# Patient Record
Sex: MALE | NOT HISPANIC OR LATINO | Employment: FULL TIME | URBAN - METROPOLITAN AREA
[De-identification: names, ages, dates, MRNs, and addresses within clinical notes are randomized per-mention and may not be internally consistent; named-entity substitution may affect disease eponyms.]

---

## 2017-08-28 ENCOUNTER — TELEPHONE (OUTPATIENT)
Dept: UROLOGY | Facility: CLINIC | Age: 52
End: 2017-08-28

## 2017-08-28 DIAGNOSIS — R97.20 ELEVATED PROSTATE SPECIFIC ANTIGEN (PSA): Primary | ICD-10-CM

## 2017-08-28 NOTE — TELEPHONE ENCOUNTER
Reason for Call:  Other orders    Detailed comments: Pt needs lab orders put into Epic before 10/03 appt, please call to confirm    Phone Number Patient can be reached at: Cell number on file:  [phone]  Telephone Information:           Best Time: today    Can we leave a detailed message on this number? YES    Call taken on 8/28/2017 at 9:15 AM by Ayla Caldwell

## 2017-08-28 NOTE — TELEPHONE ENCOUNTER
To Dr. Eisenberg to place orders that pt is referring to.  Last office visit was on 07-12-16 with Dr. Eisenberg.    Emily Humphrey  [facility] RN

## 2017-09-07 ENCOUNTER — OFFICE VISIT (OUTPATIENT)
Dept: SLEEP MEDICINE | Facility: CLINIC | Age: 52
End: 2017-09-07
Payer: COMMERCIAL

## 2017-09-07 VITALS
OXYGEN SATURATION: 96 % | DIASTOLIC BLOOD PRESSURE: 75 MMHG | HEIGHT: 69 IN | WEIGHT: 208 LBS | SYSTOLIC BLOOD PRESSURE: 119 MMHG | BODY MASS INDEX: 30.81 KG/M2 | HEART RATE: 71 BPM

## 2017-09-07 DIAGNOSIS — G47.33 OSA (OBSTRUCTIVE SLEEP APNEA): Primary | ICD-10-CM

## 2017-09-07 PROCEDURE — 99205 OFFICE O/P NEW HI 60 MIN: CPT | Performed by: FAMILY MEDICINE

## 2017-09-07 RX ORDER — FLUTICASONE PROPIONATE 50 MCG
1 SPRAY, SUSPENSION (ML) NASAL DAILY
COMMUNITY
End: 2017-09-25

## 2017-09-07 NOTE — MR AVS SNAPSHOT
After Visit Summary   9/7/2017    Emanuel Landeros    MRN: [id number]           Patient Information     Date Of Birth          1965        Visit Information        Provider Department      9/7/2017 1:00 PM Bernardo Williamson MD [facility]        Today's Diagnoses     TERESA (obstructive sleep apnea)    -  1      Care Instructions      Your BMI is Body mass index is 30.72 kg/(m^2).  Weight management is a personal decision.  If you are interested in exploring weight loss strategies, the following discussion covers the approaches that may be successful. Body mass index (BMI) is one way to tell whether you are at a healthy weight, overweight, or obese. It measures your weight in relation to your height.  A BMI of 18.5 to 24.9 is in the healthy range. A person with a BMI of 25 to 29.9 is considered overweight, and someone with a BMI of 30 or greater is considered obese. More than two-thirds of American adults are considered overweight or obese.  Being overweight or obese increases the risk for further weight gain. Excess weight may lead to heart disease and diabetes.  Creating and following plans for healthy eating and physical activity may help you improve your health.  Weight control is part of healthy lifestyle and includes exercise, emotional health, and healthy eating habits. Careful eating habits lifelong are the mainstay of weight control. Though there are significant health benefits from weight loss, long-term weight loss with diet alone may be very difficult to achieve- studies show long-term success with dietary management in less than 10% of people. Attaining a healthy weight may be especially difficult to achieve in those with severe obesity. In some cases, medications, devices and surgical management might be considered.  What can you do?  If you are overweight or obese and are interested in methods for weight loss, you should discuss this with your provider.     Consider  reducing daily calorie intake by 500 calories.     Keep a food journal.     Avoiding skipping meals, consider cutting portions instead.    Diet combined with exercise helps maintain muscle while optimizing fat loss. Strength training is particularly important for building and maintaining muscle mass. Exercise helps reduce stress, increase energy, and improves fitness. Increasing exercise without diet control, however, may not burn enough calories to loose weight.       Start walking three days a week 10-20 minutes at a time    Work towards walking thirty minutes five days a week     Eventually, increase the speed of your walking for 1-2 minutes at time    In addition, we recommend that you review healthy lifestyles and methods for weight loss available through the National Institutes of Health patient information sites:  http://win.niddk.nih.gov/publications/index.htm    And look into health and wellness programs that may be available through your health insurance provider, employer, local community center, or bryce club.    Weight management plan: Patient was referred to their PCP to discuss a diet and exercise plan.            Follow-ups after your visit        Follow-up notes from your care team     Return in about 1 year (around 9/7/2018), or if symptoms worsen or fail to improve.      Your next 10 appointments already scheduled     Oct 03, 2017  3:30 PM CDT   New Visit with [facility]TI [facility]   [facility]    [address] [address] [phone]              Who to contact     If you have questions or need follow up information about today's clinic visit or your schedule please contact [facility] directly at [phone].  Normal or non-critical lab and imaging results will be communicated to you by MyChart, letter or phone within 4 business days after the clinic has received the results. If you do not hear from us within 7 days,  "please contact the clinic through Mobeon or phone. If you have a critical or abnormal lab result, we will notify you by phone as soon as possible.  Submit refill requests through Mobeon or call your pharmacy and they will forward the refill request to us. Please allow 3 business days for your refill to be completed.          Additional Information About Your Visit        [employer] Information     Mobeon lets you send messages to your doctor, view your test results, renew your prescriptions, schedule appointments and more. To sign up, go to [email or web address] . Click on \"Log in\" on the left side of the screen, which will take you to the Welcome page. Then click on \"Sign up Now\" on the right side of the page.     You will be asked to enter the access code listed below, as well as some personal information. Please follow the directions to create your username and password.     Your access code is: [email or web address]  Expires: 2017  1:54 PM     Your access code will  in 90 days. If you need help or a new code, please call your [facility] clinic or [phone].        Care EveryWhere ID     This is your Care EveryWhere ID. This could be used by other organizations to access your [facility] medical records  [id number]        Your Vitals Were     Pulse Height Pulse Oximetry BMI (Body Mass Index)          71 1.753 m (5' 9\") 96% 30.72 kg/m2         Blood Pressure from Last 3 Encounters:   17 119/75   16 (!) 140/92   16 130/83    Weight from Last 3 Encounters:   17 94.3 kg (208 lb)   16 91.2 kg (201 lb)   16 86.2 kg (190 lb)              We Performed the Following     Comprehensive DME        Primary Care Provider    None Specified       No primary provider on file.        Equal Access to Services     [facility] JUDY : Karlos Cardenas, ori kapadia, qakaela phillips, ella javed. [facility] [phone].    ATENCIÓN: Si habla " español, tiene a green disposición servicios gratuitos de asistencia lingüística. Adolfo phelan [phone].    We comply with applicable federal civil rights laws and [place] laws. We do not discriminate on the basis of race, color, national origin, age, disability sex, sexual orientation or gender identity.            Thank you!     Thank you for choosing [facility]  for your care. Our goal is always to provide you with excellent care. Hearing back from our patients is one way we can continue to improve our services. Please take a few minutes to complete the written survey that you may receive in the mail after your visit with us. Thank you!             Your Updated Medication List - Protect others around you: Learn how to safely use, store and throw away your medicines at www.disposemymeds.org.          This list is accurate as of: 9/7/17  1:54 PM.  Always use your most recent med list.                   Brand Name Dispense Instructions for use Diagnosis    FLONASE 50 MCG/ACT spray   Generic drug:  fluticasone      Spray 1 spray into both nostrils daily        order for DME     1 each    CPAP: 8 cm H2O  Lifetime need and heated humidity.    TERESA (obstructive sleep apnea)       ZYRTEC PO      OTC AS NEEDED

## 2017-09-07 NOTE — PROGRESS NOTES
"  Sleep Consultation:    Date on this visit: 2017    Emanuel Landeros is self-referred for a sleep consultation regarding re-establishing care for TERESA.    Primary Physician: No primary care provider on file.     CC: \"I wanted to try some different masks.\"    HPI:  Emanuel Landeros is a 50 yo M with history of mild to moderate TERESA, testosterone deficiency who presents to re-establish care for TERESA to get new supplies.  He is alone for this visit today.    Last seen on 2013 for annual follow-up.  Initial concerns of sleep maintenance insomnia, snoring, daytime fatigue.  Adequate compliance, complicated by co-morbid shift sleep disorder working 10pm - 6am shift as .    Returns today to get new CPAP supplies.  Notes he stopped using his CPAP ~6 months after our last visit.  But, restarted ~3 months ago due to significant snoring.  Snoring has resolved with use of CPAP, unsure if benefit in regards to daytime fatigue.  Is now working day shift as  at the [employer].  Denies any insomnia concerns today.    CPAP download from 2017 - 2017 on set pressure 8 cm H2O.  Used on 57/71 days, average daily usage of 5:06, used >= 4 hours on 77% of nights.  AHI 0.9.    Prior Sleep Testin2012 - PSG with weight 191 lbs, AHI 12.9, RDI 37, eloy SpO2 88%      Allergies:    No Known Allergies    Medications:    Current Outpatient Prescriptions   Medication Sig Dispense Refill     fluticasone (FLONASE) 50 MCG/ACT spray Spray 1 spray into both nostrils daily       ZYRTEC PO OTC AS NEEDED       ORDER FOR DME CPAP:  8 cm H2O    Lifetime need and heated humidity.      1 each        Problem List:  Patient Active Problem List    Diagnosis Date Noted     TERESA (obstructive sleep apnea) 2012     Priority: Medium     2012: Mild TERESA (AHI ~11, eloy desat ~88%, strong positional component)       CARDIOVASCULAR SCREENING; LDL GOAL LESS THAN 160 10/31/2010     Priority: Medium     " Testosterone deficiency 06/28/2010     Priority: Medium        Past Medical/Surgical History:  No past medical history on file.  Past Surgical History:   Procedure Laterality Date     C6-C7 fusion  2006     [facility]        Social History:  Social History     Social History     Marital status:      Spouse name: N/A     Number of children: N/A     Years of education: N/A     Occupational History     Not on file.     Social History Main Topics     Smoking status: Former Smoker     Quit date: 6/17/2003     Smokeless tobacco: Never Used     Alcohol use Yes      Comment: minimal     Drug use: No      Comment: markaila     Sexual activity: Yes     Partners: Female     Other Topics Concern     Not on file     Social History Narrative       Family History:  Family History   Problem Relation Age of Onset     Prostate Cancer Father      Prostate Cancer Paternal Grandfather        Review of Systems:  A complete review of systems reviewed by me is negative with the exeption of what has been mentioned in the history of present illness.  CONSTITUTIONAL: NEGATIVE for weight gain/loss, fever, chills, sweats or night sweats, drug allergies.  EYES:  POSITIVE for changes in vision  ENT:  POSITIVE for  sinus pain, post-nasal drip and runny nose  CARDIAC:  POSITIVE for  breathlessness when lying flat  NEUROLOGIC:  POSITIVE for  headaches and weakness or numbness in the arms or legs  DERMATOLOGIC: NEGATIVE for rashes, new moles or change in mole(s)  PULMONARY:  POSITIVE for  SOB with activity and productive cough  GASTROINTESTINAL: NEGATIVE for nausea or vomitting, loose or watery stools, fat or grease in stools, constipation, abdominal pain, bowel movements black in color or blood noted.  GENITOURINARY:  POSITIVE for  urinating more frequently than usual  MUSCULOSKELETAL:  POSITIVE for  muscle pain and bone or joint pain  ENDOCRINE: NEGATIVE for increased thirst or urination, diabetes.  LYMPHATIC: NEGATIVE for swollen lymph nodes,  "lumps or bumps in the breasts or nipple discharge.    Physical Examination:  Vitals: /75  Pulse 71  Ht 1.753 m (5' 9\")  Wt 94.3 kg (208 lb)  SpO2 96%  BMI 30.72 kg/m2  BMI= Body mass index is 30.72 kg/(m^2).    Neck Cir (cm): 42 cm    [place] Total Score 9/7/2017   Total score - [place] 6       GENERAL APPEARANCE: healthy, alert, no distress and fatigued  RESP: lungs clear to auscultation - no rales, rhonchi or wheezes  CV: regular rates and rhythm, normal S1 S2, no S3 or S4 and no murmur, click or rub  PSYCH: mentation appears normal and affect normal/bright    Impression/Plan:    Emanuel Landeros is a 50 yo M with history of mild to moderate TERESA, testosterone deficiency who presents to re-establish care for TERESA to get new supplies.    TERSEA appears adequately controlled per download.  Continue CPAP at 8 cm H2O, new supplies with mask fitting.  If doing well, plan for follow-up in 1 year.    I have spent 60 minutes with this patient today in which greater than 50% of this time was spent in the counseling / coordination of care regarding TERESA.    Polysomnography reviewed.  Obstructive sleep apnea reviewed.  Complications of untreated sleep apnea were reviewed.    Bernardo Williamson MD    CC: No ref. provider found        "

## 2017-09-07 NOTE — PATIENT INSTRUCTIONS

## 2017-09-16 NOTE — NURSING NOTE
"Chief Complaint   Patient presents with     Consult     TERESA, has CPAP, re-started 6 months ago, wants to re-establish       Initial /75  Pulse 71  Ht 1.753 m (5' 9\")  Wt 94.3 kg (208 lb)  SpO2 96%  BMI 30.72 kg/m2 Estimated body mass index is 30.72 kg/(m^2) as calculated from the following:    Height as of this encounter: 1.753 m (5' 9\").    Weight as of this encounter: 94.3 kg (208 lb).  Medication Reconciliation: complete    "
Adjustment disorder with anxious mood

## 2017-09-25 ENCOUNTER — ALLIED HEALTH/NURSE VISIT (OUTPATIENT)
Dept: FAMILY MEDICINE | Facility: CLINIC | Age: 52
End: 2017-09-25
Payer: COMMERCIAL

## 2017-09-25 DIAGNOSIS — Z23 NEED FOR PROPHYLACTIC VACCINATION AND INOCULATION AGAINST INFLUENZA: Primary | ICD-10-CM

## 2017-09-25 DIAGNOSIS — R97.20 ELEVATED PROSTATE SPECIFIC ANTIGEN (PSA): ICD-10-CM

## 2017-09-25 DIAGNOSIS — J30.2 SEASONAL ALLERGIC RHINITIS: Primary | ICD-10-CM

## 2017-09-25 PROCEDURE — 84153 ASSAY OF PSA TOTAL: CPT | Performed by: UROLOGY

## 2017-09-25 PROCEDURE — 36415 COLL VENOUS BLD VENIPUNCTURE: CPT | Performed by: UROLOGY

## 2017-09-25 PROCEDURE — 90471 IMMUNIZATION ADMIN: CPT

## 2017-09-25 PROCEDURE — 99207 ZZC NO CHARGE NURSE ONLY: CPT

## 2017-09-25 PROCEDURE — 90686 IIV4 VACC NO PRSV 0.5 ML IM: CPT

## 2017-09-25 RX ORDER — FLUTICASONE PROPIONATE 50 MCG
1 SPRAY, SUSPENSION (ML) NASAL DAILY
Qty: 1 BOTTLE | Refills: 0 | Status: SHIPPED | OUTPATIENT
Start: 2017-09-25 | End: 2018-02-11

## 2017-09-25 NOTE — TELEPHONE ENCOUNTER
States uses flonase for seasonal allergies.  Advise needs office visit, physical exam prior to further refill.  AVERY Williamson RN

## 2017-09-25 NOTE — MR AVS SNAPSHOT
"              After Visit Summary   9/25/2017    Emanuel Landeros    MRN: [id number]           Patient Information     Date Of Birth          1965        Visit Information        Provider Department      9/25/2017 4:15 PM FL PI CMA/LPN [facility]        Today's Diagnoses     Need for prophylactic vaccination and inoculation against influenza    -  1       Follow-ups after your visit        Your next 10 appointments already scheduled     Sep 25, 2017  4:15 PM CDT   Nurse Only with FL PI C[facility]/LPN   [facility]    [address] [facility] [address]   [phone]            Oct 03, 2017  3:30 PM CDT   New Visit with PATTI Eisenberg MD   [facility]    [address] [address] [phone]              Who to contact     If you have questions or need follow up information about today's clinic visit or your schedule please contact [facility] directly at [phone].  Normal or non-critical lab and imaging results will be communicated to you by [employer] Matters Marketshart, letter or phone within 4 business days after the clinic has received the results. If you do not hear from us within 7 days, please contact the clinic through Yeelinkt or phone. If you have a critical or abnormal lab result, we will notify you by phone as soon as possible.  Submit refill requests through Motosmarty or call your pharmacy and they will forward the refill request to us. Please allow 3 business days for your refill to be completed.          Additional Information About Your Visit        [employer] [place] Information     Motosmarty lets you send messages to your doctor, view your test results, renew your prescriptions, schedule appointments and more. To sign up, go to [email or web address] . Click on \"Log in\" on the left side of the screen, which will take you to the Welcome page. Then click on \"Sign up Now\" on the right side of " the page.     You will be asked to enter the access code listed below, as well as some personal information. Please follow the directions to create your username and password.     Your access code is: [email or web address]  Expires: 2017  1:54 PM     Your access code will  in 90 days. If you need help or a new code, please call your [facility] clinic or [phone].        Care EveryWhere ID     This is your Care EveryWhere ID. This could be used by other organizations to access your [facility] medical records  [id number]         Blood Pressure from Last 3 Encounters:   17 119/75   16 (!) 140/92   16 130/83    Weight from Last 3 Encounters:   17 208 lb (94.3 kg)   16 201 lb (91.2 kg)   16 190 lb (86.2 kg)              We Performed the Following     FLU VAC, SPLIT VIRUS IM > 3 YO (QUADRIVALENT) [11941]     Vaccine Administration, Initial [47217]        Primary Care Provider    None Specified       No primary provider on file.        Equal Access to Services     [facility]: Hadii matt Cardenas, waeshada steffi, qakaela phillips, ella velázquez . [facility] [phone].    ATENCIÓN: Si habla español, tiene a green disposición servicios gratuitos de asistencia lingüística. Adolfo al [phone].    We comply with applicable federal civil rights laws and [place] laws. We do not discriminate on the basis of race, color, national origin, age, disability sex, sexual orientation or gender identity.            Thank you!     Thank you for choosing [facility]  for your care. Our goal is always to provide you with excellent care. Hearing back from our patients is one way we can continue to improve our services. Please take a few minutes to complete the written survey that you may receive in the mail after your visit with us. Thank you!             Your Updated Medication List - Protect others around you: Learn how to safely use,  store and throw away your medicines at www.disposemymeds.org.          This list is accurate as of: 9/25/17  4:01 PM.  Always use your most recent med list.                   Brand Name Dispense Instructions for use Diagnosis    FLONASE 50 MCG/ACT spray   Generic drug:  fluticasone      Spray 1 spray into both nostrils daily        order for DME     1 each    CPAP: 8 cm H2O  Lifetime need and heated humidity.    TERESA (obstructive sleep apnea)       ZYRTEC PO      OTC AS NEEDED

## 2017-09-26 LAB — PSA SERPL-MCNC: 0.36 UG/L (ref 0–4)

## 2017-10-03 ENCOUNTER — OFFICE VISIT (OUTPATIENT)
Dept: UROLOGY | Facility: CLINIC | Age: 52
End: 2017-10-03
Payer: COMMERCIAL

## 2017-10-03 VITALS
SYSTOLIC BLOOD PRESSURE: 124 MMHG | WEIGHT: 208 LBS | BODY MASS INDEX: 30.81 KG/M2 | HEART RATE: 113 BPM | DIASTOLIC BLOOD PRESSURE: 75 MMHG | HEIGHT: 69 IN | RESPIRATION RATE: 16 BRPM

## 2017-10-03 DIAGNOSIS — Z12.5 SCREENING FOR PROSTATE CANCER: Primary | ICD-10-CM

## 2017-10-03 PROCEDURE — 99213 OFFICE O/P EST LOW 20 MIN: CPT | Mod: 25 | Performed by: UROLOGY

## 2017-10-03 PROCEDURE — 51798 US URINE CAPACITY MEASURE: CPT | Performed by: UROLOGY

## 2017-10-03 NOTE — MR AVS SNAPSHOT
"              After Visit Summary   10/3/2017    Emanuel Landeros    MRN: [id number]           Patient Information     Date Of Birth          1965        Visit Information        Provider Department      10/3/2017 3:30 PM PATTI Eisenberg MD [facility]        Today's Diagnoses     Screening for prostate cancer    -  1      Care Instructions    Per Physician's instructions            Follow-ups after your visit        Who to contact     If you have questions or need follow up information about today's clinic visit or your schedule please contact [facility] directly at [phone].  Normal or non-critical lab and imaging results will be communicated to you by SPOC Medicalhart, letter or phone within 4 business days after the clinic has received the results. If you do not hear from us within 7 days, please contact the clinic through Aletht or phone. If you have a critical or abnormal lab result, we will notify you by phone as soon as possible.  Submit refill requests through Vaavud or call your pharmacy and they will forward the refill request to us. Please allow 3 business days for your refill to be completed.          Additional Information About Your Visit        MyChart Information     Vaavud lets you send messages to your doctor, view your test results, renew your prescriptions, schedule appointments and more. To sign up, go to [email or web address] . Click on \"Log in\" on the left side of the screen, which will take you to the Welcome page. Then click on \"Sign up Now\" on the right side of the page.     You will be asked to enter the access code listed below, as well as some personal information. Please follow the directions to create your username and password.     Your access code is: [email or web address]  Expires: 2017  1:54 PM     Your access code will  in 90 days. If you need help or a new code, please call your [facility] or [phone].        Care EveryWhere ID     This is " "your Care EveryWhere ID. This could be used by other organizations to access your [facility] medical records  [id number]        Your Vitals Were     Pulse Respirations Height BMI (Body Mass Index)          113 16 1.753 m (5' 9\") 30.72 kg/m2         Blood Pressure from Last 3 Encounters:   10/03/17 124/75   09/07/17 119/75   12/02/16 (!) 140/92    Weight from Last 3 Encounters:   10/03/17 94.3 kg (208 lb)   09/07/17 94.3 kg (208 lb)   12/02/16 91.2 kg (201 lb)              We Performed the Following     MEASURE POST-VOID RESIDUAL URINE/BLADDER CAPACITY, US NON-IMAGING        Primary Care Provider    None Specified       No primary provider on file.        Equal Access to Services     [facility] KIM : Karlos Cardenas, wasandeep luqadaha, qaybta kaalmada jacqueline, ella velázquez . So [facility] [phone].    ATENCIÓN: Si habla español, tiene a green disposición servicios gratuitos de asistencia lingüística. Llame al [phone].    We comply with applicable federal civil rights laws and [place] laws. We do not discriminate on the basis of race, color, national origin, age, disability, sex, sexual orientation, or gender identity.            Thank you!     Thank you for choosing [facility]  for your care. Our goal is always to provide you with excellent care. Hearing back from our patients is one way we can continue to improve our services. Please take a few minutes to complete the written survey that you may receive in the mail after your visit with us. Thank you!             Your Updated Medication List - Protect others around you: Learn how to safely use, store and throw away your medicines at www.disposemymeds.org.          This list is accurate as of: 10/3/17 11:59 PM.  Always use your most recent med list.                   Brand Name Dispense Instructions for use Diagnosis    fluticasone 50 MCG/ACT spray    FLONASE    1 Bottle    Spray 1 spray into both nostrils daily " NEEDS OFFICE VISIT PRIOR TO FURTHER REFILL    Seasonal allergic rhinitis       order for DME     1 each    CPAP: 8 cm H2O  Lifetime need and heated humidity.    TERESA (obstructive sleep apnea)       ZYRTEC PO      OTC AS NEEDED

## 2017-10-03 NOTE — NURSING NOTE
"Chief Complaint   Patient presents with     Annual Visit     prostate exam       Initial /75 (BP Location: Right arm, Patient Position: Chair, Cuff Size: Adult Regular)  Pulse 113  Resp 16  Ht 1.753 m (5' 9\")  Wt 94.3 kg (208 lb)  BMI 30.72 kg/m2 Estimated body mass index is 30.72 kg/(m^2) as calculated from the following:    Height as of this encounter: 1.753 m (5' 9\").    Weight as of this encounter: 94.3 kg (208 lb).  Medication Reconciliation: complete     Conrad Segovia CMA    Active order to obtain bladder scan? Yes   Name of ordering provider:  Dr. Eisenberg  Bladder scan preformed post void Yes  Bladder scan reveled 33ML  Provider notified?  Yes    Conrad Segovia CMA      "

## 2017-10-04 NOTE — PROGRESS NOTES
Appointment source: Established Patient  Patient name: Emanuel Landeros  Urology Staff: Diogenes Eisenberg MD    Subjective: This is a 52 year old year old male returning for prostate cancer screening.    Objective:  Family history of prostate cancer.    PSA 0.36 ng ml on 9/25/17.    Prostate benign to palpation.    No voiding symptoms.    Assessment:  Low probability of prostate cancer.    Plan:  Return in one year.    Total time 20 minutes, counseling 15 minutes discussing prostate cancer screening

## 2017-10-28 ENCOUNTER — VIRTUAL VISIT (OUTPATIENT)
Dept: FAMILY MEDICINE | Facility: OTHER | Age: 52
End: 2017-10-28

## 2017-10-30 ENCOUNTER — OFFICE VISIT (OUTPATIENT)
Dept: FAMILY MEDICINE | Facility: CLINIC | Age: 52
End: 2017-10-30
Payer: COMMERCIAL

## 2017-10-30 VITALS
HEART RATE: 87 BPM | WEIGHT: 206 LBS | OXYGEN SATURATION: 99 % | RESPIRATION RATE: 16 BRPM | BODY MASS INDEX: 30.42 KG/M2 | TEMPERATURE: 98.5 F | SYSTOLIC BLOOD PRESSURE: 126 MMHG | DIASTOLIC BLOOD PRESSURE: 74 MMHG

## 2017-10-30 DIAGNOSIS — Z20.818 STREP THROAT EXPOSURE: ICD-10-CM

## 2017-10-30 DIAGNOSIS — J20.9 ACUTE BRONCHITIS WITH SYMPTOMS > 10 DAYS: Primary | ICD-10-CM

## 2017-10-30 LAB
DEPRECATED S PYO AG THROAT QL EIA: NORMAL
SPECIMEN SOURCE: NORMAL

## 2017-10-30 PROCEDURE — 99213 OFFICE O/P EST LOW 20 MIN: CPT | Performed by: FAMILY MEDICINE

## 2017-10-30 PROCEDURE — 87081 CULTURE SCREEN ONLY: CPT | Performed by: FAMILY MEDICINE

## 2017-10-30 PROCEDURE — 87880 STREP A ASSAY W/OPTIC: CPT | Performed by: FAMILY MEDICINE

## 2017-10-30 RX ORDER — AZITHROMYCIN 250 MG/1
TABLET, FILM COATED ORAL
Qty: 6 TABLET | Refills: 0 | Status: SHIPPED | OUTPATIENT
Start: 2017-10-30 | End: 2018-04-26

## 2017-10-30 NOTE — PROGRESS NOTES
"Date:   Clinician: Cailin Stuart  Clinician NPI: [id number]  Patient: Emanuel Landeros  Patient : 1967  Patient Address: [facility] [address] , [place] [id number]  Patient Phone: [phone]  Visit Protocol: URI  Patient Summary:  Emanuel is a 50 year old ( : 1967 ) male who initiated a Visit for cold, sinus infection, or influenza. When asked the question \"Please sign me up to receive news, health information and promotions from [employer].\", Emanuel responded \"No\".    Emanuel states his symptoms started gradually 2-3 weeks ago. After his symptoms started, they improved and then got worse again.   His symptoms consist of nasal congestion, malaise, rhinitis, and a cough.   Symptom Details     Nasal secretions: The color of his mucus is green.    Cough: Emanuel coughs almost every minute and his cough is more bothersome at night. Phlegm comes into his throat when he coughs. He does not believe the phlegm causes the cough. The color of the phlegm is green.      Emanuel denies having wheezing, myalgias, sore throat, dyspnea, ear pain, facial pain or pressure, headache, teeth pain, chills, and fever. He also denies taking antibiotic medication for the symptoms and having recent facial or sinus surgery in the past 60 days.   He has not recently been exposed to someone with influenza. Emanuel has been in close contact with the following high risk individuals: people with asthma, heart disease or diabetes.   Weight: 204 lbs   Emanuel does not smoke or use smokeless tobacco.    MEDICATIONS:  No current medications , ALLERGIES:  NKDA   Clinician Response:  Dear Emanuel,  I am sorry you are not feeling well. Your health is our priority. Based on the information provided, an in-person evaluation is required. Please be seen in a clinic or urgent care to receive the additional care you need.  You will not be charged for this Visit. Thank you for trusting us with your care.   Diagnosis: Refer for additional evaluation  Diagnosis " ICD: R69  Additional Clinician Notes: Emanuel, You were already triaged out of this health care delivery platform for your symptoms earlier. Please be evaluated in a clinic or urgent care now as advised.

## 2017-10-30 NOTE — PROGRESS NOTES
SUBJECTIVE:   Emanuel Landeros is a 52 year old male who presents to clinic today for the following health issues:      RESPIRATORY SYMPTOMS      Duration: 2-3 weeks    Description  nasal congestion, rhinorrhea, facial pain/pressure and flem, coughing    Severity: moderate    Accompanying signs and symptoms: None    History (predisposing factors):  strep exposure    Precipitating or alleviating factors: None    Therapies tried and outcome:  Cough drops        Problem list and histories reviewed & adjusted, as indicated.  Additional history: as documented    Patient Active Problem List   Diagnosis     Testosterone deficiency     CARDIOVASCULAR SCREENING; LDL GOAL LESS THAN 160     TERESA (obstructive sleep apnea)     Past Surgical History:   Procedure Laterality Date     C6-C7 fusion  2006     [facility]        Social History   Substance Use Topics     Smoking status: Former Smoker     Quit date: 6/17/2003     Smokeless tobacco: Never Used     Alcohol use Yes      Comment: minimal     Family History   Problem Relation Age of Onset     Prostate Cancer Father      Prostate Cancer Paternal Grandfather          Current Outpatient Prescriptions   Medication Sig Dispense Refill     azithromycin (ZITHROMAX) 250 MG tablet Two tablets first day, then one tablet daily for four days. 6 tablet 0     fluticasone (FLONASE) 50 MCG/ACT spray Spray 1 spray into both nostrils daily NEEDS OFFICE VISIT PRIOR TO FURTHER REFILL 1 Bottle 0     ORDER FOR DME CPAP:  8 cm H2O    Lifetime need and heated humidity.      (Patient not taking: Reported on 10/30/2017) 1 each      ZYRTEC PO OTC AS NEEDED       No Known Allergies  Recent Labs   Lab Test  06/28/10   1605  06/17/10   1415   TSH  2.49  1.98      BP Readings from Last 3 Encounters:   10/30/17 126/74   10/03/17 124/75   09/07/17 119/75    Wt Readings from Last 3 Encounters:   10/30/17 206 lb (93.4 kg)   10/03/17 208 lb (94.3 kg)   09/07/17 208 lb (94.3 kg)                  Labs reviewed in  EPIC          Reviewed and updated as needed this visit by clinical staff     Reviewed and updated as needed this visit by Provider         ROS:  Constitutional, HEENT, cardiovascular, pulmonary, gi and gu systems are negative, except as otherwise noted.      OBJECTIVE:   /74  Pulse 87  Temp 98.5  F (36.9  C) (Tympanic)  Resp 16  Wt 206 lb (93.4 kg)  SpO2 99%  BMI 30.42 kg/m2  Body mass index is 30.42 kg/(m^2).  GENERAL: alert and no distress  EYES: Eyes grossly normal to inspection, PERRL and conjunctivae and sclerae normal  HENT: ear canals and TM's normal, nose and mouth without ulcers or lesions  NECK: no adenopathy, no asymmetry, masses, or scars and thyroid normal to palpation  RESP: lungs clear to auscultation - no rales, rhonchi or wheezes  CV: regular rates and rhythm, normal S1 S2, no S3 or S4 and no murmur, click or rub  ABDOMEN: soft, nontender, no hepatosplenomegaly, no masses and bowel sounds normal  MS: no gross musculoskeletal defects noted, no edema    Diagnostic Test Results:  Results for orders placed or performed in visit on 10/30/17 (from the past 24 hour(s))   Strep, Rapid Screen   Result Value Ref Range    Specimen Description Throat     Rapid Strep A Screen       NEGATIVE: No Group A streptococcal antigen detected by immunoassay, await culture report.       ASSESSMENT/PLAN:         ICD-10-CM    1. Acute bronchitis with symptoms > 10 days J20.9 azithromycin (ZITHROMAX) 250 MG tablet   2. Strep throat exposure Z20.818 Strep, Rapid Screen     Beta strep group A culture       Discussed in detail differentials and further management. Symptoms are likely secondary to acute bronchitis. Azithromycin prescribed, common side effects discussed. Recommended well hydration, over-the-counter analgesia, warm fluids and saline gargles. Written instructions/information provided. Patient understood and in agreement with the above plan. All questions are answered. Follow-up if symptoms persist or  worsen.      MEDICATIONS:   Orders Placed This Encounter   Medications     azithromycin (ZITHROMAX) 250 MG tablet     Sig: Two tablets first day, then one tablet daily for four days.     Dispense:  6 tablet     Refill:  0     Patient Instructions     Bronchitis, Antibiotic Treatment (Adult)    Bronchitis is an infection of the air passages (bronchial tubes) in your lungs. It often occurs when you have a cold. This illness is contagious during the first few days and is spread through the air by coughing and sneezing, or by direct contact (touching the sick person and then touching your own eyes, nose, or mouth).  Symptoms of bronchitis include cough with mucus (phlegm) and low-grade fever. Bronchitis usually lasts 7 to 14 days. Mild cases can be treated with simple home remedies. More severe infection is treated with an antibiotic.  Home care  Follow these guidelines when caring for yourself at home:    If your symptoms are severe, rest at home for the first 2 to 3 days. When you go back to your usual activities, don't let yourself get too tired.    Do not smoke. Also avoid being exposed to secondhand smoke.    You may use over-the-counter medicines to control fever or pain, unless another medicine was prescribed. (Note: If you have chronic liver or kidney disease or have ever had a stomach ulcer or gastrointestinal bleeding, talk with your healthcare provider before using these medicines. Also talk to your provider if you are taking medicine to prevent blood clots.) Aspirin should never be given to anyone younger than 18 years of age who is ill with a viral infection or fever. It may cause severe liver or brain damage.    Your appetite may be poor, so a light diet is fine. Avoid dehydration by drinking 6 to 8 glasses of fluids per day (such as water, soft drinks, sports drinks, juices, tea, or soup). Extra fluids will help loosen secretions in the nose and lungs.    Over-the-counter cough, cold, and sore-throat  medicines will not shorten the length of the illness, but they may be helpful to reduce symptoms. (Note: Do not use decongestants if you have high blood pressure.)    Finish all antibiotic medicine. Do this even if you are feeling better after only a few days.  Follow-up care  Follow up with your healthcare provider, or as advised. If you had an X-ray or ECG (electrocardiogram), a specialist will review it. You will be notified of any new findings that may affect your care.  Note: If you are age 65 or older, or if you have a chronic lung disease or condition that affects your immune system, or you smoke, talk to your healthcare provider about having pneumococcal vaccinations and a yearly influenza vaccination (flu shot).  When to seek medical advice  Call your healthcare provider right away if any of these occur:    Fever of 100.4 F (38 C) or higher    Coughing up increased amounts of colored sputum    Weakness, drowsiness, headache, facial pain, ear pain, or a stiff neck  Call 911, or get immediate medical care  Contact emergency services right away if any of these occur.    Coughing up blood    Worsening weakness, drowsiness, headache, or stiff neck    Trouble breathing, wheezing, or pain with breathing  Date Last Reviewed: 9/13/2015 2000-2017 [employer]. [address] [place] [address], [place] [address]. All rights reserved. This information is not intended as a substitute for professional medical care. Always follow your healthcare professional's instructions.        Patient Education    Azithromycin Ophthalmic drops, solution    Azithromycin Oral suspension    Azithromycin Oral suspension, extended release    Azithromycin Oral tablet    Azithromycin Solution for injection  Azithromycin Oral tablet  What is this medicine?  AZITHROMYCIN (az ith genie MYE sin) is a macrolide antibiotic. It is used to treat or prevent certain kinds of bacterial infections. It will not work for colds, flu, or other viral  infections.  This medicine may be used for other purposes; ask your health care provider or pharmacist if you have questions.  What should I tell my health care provider before I take this medicine?  They need to know if you have any of these conditions:    kidney disease    liver disease    irregular heartbeat or heart disease    an unusual or allergic reaction to azithromycin, erythromycin, other macrolide antibiotics, foods, dyes, or preservatives    pregnant or trying to get pregnant    breast-feeding  How should I use this medicine?  Take this medicine by mouth with a full glass of water. Follow the directions on the prescription label. The tablets can be taken with food or on an empty stomach. If the medicine upsets your stomach, take it with food. Take your medicine at regular intervals. Do not take your medicine more often than directed. Take all of your medicine as directed even if you think your are better. Do not skip doses or stop your medicine early.  Talk to your pediatrician regarding the use of this medicine in children. Special care may be needed.  Overdosage: If you think you have taken too much of this medicine contact a poison control center or emergency room at once.  NOTE: This medicine is only for you. Do not share this medicine with others.  What if I miss a dose?  If you miss a dose, take it as soon as you can. If it is almost time for your next dose, take only that dose. Do not take double or extra doses.  What may interact with this medicine?  Do not take this medicine with any of the following medications:    lincomycin  This medicine may also interact with the following medications:    amiodarone    antacids    birth control pills    cyclosporine    digoxin    magnesium    nelfinavir    phenytoin    warfarin  This list may not describe all possible interactions. Give your health care provider a list of all the medicines, herbs, non-prescription drugs, or dietary supplements you use. Also  tell them if you smoke, drink alcohol, or use illegal drugs. Some items may interact with your medicine.  What should I watch for while using this medicine?  Tell your doctor or health care professional if your symptoms do not improve.  Do not treat diarrhea with over the counter products. Contact your doctor if you have diarrhea that lasts more than 2 days or if it is severe and watery.  This medicine can make you more sensitive to the sun. Keep out of the sun. If you cannot avoid being in the sun, wear protective clothing and use sunscreen. Do not use sun lamps or tanning beds/booths.  What side effects may I notice from receiving this medicine?  Side effects that you should report to your doctor or health care professional as soon as possible:    allergic reactions like skin rash, itching or hives, swelling of the face, lips, or tongue    confusion, nightmares or hallucinations    dark urine    difficulty breathing    hearing loss    irregular heartbeat or chest pain    pain or difficulty passing urine    redness, blistering, peeling or loosening of the skin, including inside the mouth    white patches or sores in the mouth    yellowing of the eyes or skin  Side effects that usually do not require medical attention (report to your doctor or health care professional if they continue or are bothersome):    diarrhea    dizziness, drowsiness    headache    stomach upset or vomiting    tooth discoloration    vaginal irritation  This list may not describe all possible side effects. Call your doctor for medical advice about side effects. You may report side effects to FDA at 3-038-FDA-7942.  Where should I keep my medicine?  Keep out of the reach of children.  Store at room temperature between 15 and 30 degrees C (59 and 86 degrees F). Throw away any unused medicine after the expiration date.  NOTE:This sheet is a summary. It may not cover all possible information. If you have questions about this medicine, talk to your  doctor, pharmacist, or health care provider. Copyright  2016 [employer]            Hema Weaver MD  [facility]

## 2017-10-30 NOTE — NURSING NOTE
"Chief Complaint   Patient presents with     URI       Initial /74  Pulse 87  Temp 98.5  F (36.9  C) (Tympanic)  Resp 16  Wt 206 lb (93.4 kg)  SpO2 99%  BMI 30.42 kg/m2 Estimated body mass index is 30.42 kg/(m^2) as calculated from the following:    Height as of 10/3/17: 5' 9\" (1.753 m).    Weight as of this encounter: 206 lb (93.4 kg).  Medication Reconciliation: complete    Health Maintenance that is potentially due pending provider review:  lipids    n/a    Is there anyone who you would like to be able to receive your results? No  If yes have patient fill out GIL      "

## 2017-10-30 NOTE — MR AVS SNAPSHOT
After Visit Summary   10/30/2017    Emanuel Landeros    MRN: [id number]           Patient Information     Date Of Birth          1965        Visit Information        Provider Department      10/30/2017 4:00 PM Hema Weaver MD [facility]        Today's Diagnoses     Acute bronchitis with symptoms > 10 days    -  1    Strep throat exposure          Care Instructions      Bronchitis, Antibiotic Treatment (Adult)    Bronchitis is an infection of the air passages (bronchial tubes) in your lungs. It often occurs when you have a cold. This illness is contagious during the first few days and is spread through the air by coughing and sneezing, or by direct contact (touching the sick person and then touching your own eyes, nose, or mouth).  Symptoms of bronchitis include cough with mucus (phlegm) and low-grade fever. Bronchitis usually lasts 7 to 14 days. Mild cases can be treated with simple home remedies. More severe infection is treated with an antibiotic.  Home care  Follow these guidelines when caring for yourself at home:    If your symptoms are severe, rest at home for the first 2 to 3 days. When you go back to your usual activities, don't let yourself get too tired.    Do not smoke. Also avoid being exposed to secondhand smoke.    You may use over-the-counter medicines to control fever or pain, unless another medicine was prescribed. (Note: If you have chronic liver or kidney disease or have ever had a stomach ulcer or gastrointestinal bleeding, talk with your healthcare provider before using these medicines. Also talk to your provider if you are taking medicine to prevent blood clots.) Aspirin should never be given to anyone younger than 18 years of age who is ill with a viral infection or fever. It may cause severe liver or brain damage.    Your appetite may be poor, so a light diet is fine. Avoid dehydration by drinking 6 to 8 glasses of fluids per day (such as water, soft drinks,  sports drinks, juices, tea, or soup). Extra fluids will help loosen secretions in the nose and lungs.    Over-the-counter cough, cold, and sore-throat medicines will not shorten the length of the illness, but they may be helpful to reduce symptoms. (Note: Do not use decongestants if you have high blood pressure.)    Finish all antibiotic medicine. Do this even if you are feeling better after only a few days.  Follow-up care  Follow up with your healthcare provider, or as advised. If you had an X-ray or ECG (electrocardiogram), a specialist will review it. You will be notified of any new findings that may affect your care.  Note: If you are age 65 or older, or if you have a chronic lung disease or condition that affects your immune system, or you smoke, talk to your healthcare provider about having pneumococcal vaccinations and a yearly influenza vaccination (flu shot).  When to seek medical advice  Call your healthcare provider right away if any of these occur:    Fever of 100.4 F (38 C) or higher    Coughing up increased amounts of colored sputum    Weakness, drowsiness, headache, facial pain, ear pain, or a stiff neck  Call 911, or get immediate medical care  Contact emergency services right away if any of these occur.    Coughing up blood    Worsening weakness, drowsiness, headache, or stiff neck    Trouble breathing, wheezing, or pain with breathing  Date Last Reviewed: 9/13/2015 2000-2017 [employer]. [address] [place], [place] [address]. All rights reserved. This information is not intended as a substitute for professional medical care. Always follow your healthcare professional's instructions.        Patient Education    Azithromycin Ophthalmic drops, solution    Azithromycin Oral suspension    Azithromycin Oral suspension, extended release    Azithromycin Oral tablet    Azithromycin Solution for injection  Azithromycin Oral tablet  What is this medicine?  AZITHROMYCIN (az ith genie BRUCE  sin) is a macrolide antibiotic. It is used to treat or prevent certain kinds of bacterial infections. It will not work for colds, flu, or other viral infections.  This medicine may be used for other purposes; ask your health care provider or pharmacist if you have questions.  What should I tell my health care provider before I take this medicine?  They need to know if you have any of these conditions:    kidney disease    liver disease    irregular heartbeat or heart disease    an unusual or allergic reaction to azithromycin, erythromycin, other macrolide antibiotics, foods, dyes, or preservatives    pregnant or trying to get pregnant    breast-feeding  How should I use this medicine?  Take this medicine by mouth with a full glass of water. Follow the directions on the prescription label. The tablets can be taken with food or on an empty stomach. If the medicine upsets your stomach, take it with food. Take your medicine at regular intervals. Do not take your medicine more often than directed. Take all of your medicine as directed even if you think your are better. Do not skip doses or stop your medicine early.  Talk to your pediatrician regarding the use of this medicine in children. Special care may be needed.  Overdosage: If you think you have taken too much of this medicine contact a poison control center or emergency room at once.  NOTE: This medicine is only for you. Do not share this medicine with others.  What if I miss a dose?  If you miss a dose, take it as soon as you can. If it is almost time for your next dose, take only that dose. Do not take double or extra doses.  What may interact with this medicine?  Do not take this medicine with any of the following medications:    lincomycin  This medicine may also interact with the following medications:    amiodarone    antacids    birth control pills    cyclosporine    digoxin    magnesium    nelfinavir    phenytoin    warfarin  This list may not describe all  possible interactions. Give your health care provider a list of all the medicines, herbs, non-prescription drugs, or dietary supplements you use. Also tell them if you smoke, drink alcohol, or use illegal drugs. Some items may interact with your medicine.  What should I watch for while using this medicine?  Tell your doctor or health care professional if your symptoms do not improve.  Do not treat diarrhea with over the counter products. Contact your doctor if you have diarrhea that lasts more than 2 days or if it is severe and watery.  This medicine can make you more sensitive to the sun. Keep out of the sun. If you cannot avoid being in the sun, wear protective clothing and use sunscreen. Do not use sun lamps or tanning beds/booths.  What side effects may I notice from receiving this medicine?  Side effects that you should report to your doctor or health care professional as soon as possible:    allergic reactions like skin rash, itching or hives, swelling of the face, lips, or tongue    confusion, nightmares or hallucinations    dark urine    difficulty breathing    hearing loss    irregular heartbeat or chest pain    pain or difficulty passing urine    redness, blistering, peeling or loosening of the skin, including inside the mouth    white patches or sores in the mouth    yellowing of the eyes or skin  Side effects that usually do not require medical attention (report to your doctor or health care professional if they continue or are bothersome):    diarrhea    dizziness, drowsiness    headache    stomach upset or vomiting    tooth discoloration    vaginal irritation  This list may not describe all possible side effects. Call your doctor for medical advice about side effects. You may report side effects to FDA at 7-718-HSI-2775.  Where should I keep my medicine?  Keep out of the reach of children.  Store at room temperature between 15 and 30 degrees C (59 and 86 degrees F). Throw away any unused medicine after  "the expiration date.  NOTE:This sheet is a summary. It may not cover all possible information. If you have questions about this medicine, talk to your doctor, pharmacist, or health care provider. Copyright  2016 [employer]                Follow-ups after your visit        Who to contact     If you have questions or need follow up information about today's clinic visit or your schedule please contact [facility] directly at [phone].  Normal or non-critical lab and imaging results will be communicated to you by [employer], letter or phone within 4 business days after the clinic has received the results. If you do not hear from us within 7 days, please contact the clinic through [employer] or phone. If you have a critical or abnormal lab result, we will notify you by phone as soon as possible.  Submit refill requests through [employer] or call your pharmacy and they will forward the refill request to us. Please allow 3 business days for your refill to be completed.          Additional Information About Your Visit        [employer] Information     [employer] lets you send messages to your doctor, view your test results, renew your prescriptions, schedule appointments and more. To sign up, go to [email or web address] . Click on \"Log in\" on the left side of the screen, which will take you to the Welcome page. Then click on \"Sign up Now\" on the right side of the page.     You will be asked to enter the access code listed below, as well as some personal information. Please follow the directions to create your username and password.     Your access code is: [email or web address]  Expires: 2017  1:54 PM     Your access code will  in 90 days. If you need help or a new code, please call your [facility] or [phone].        Care EveryWhere ID     This is your Care EveryWhere ID. This could be used by other organizations to access your [place] medical records  [id number]        Your Vitals Were     Pulse " Temperature Respirations Pulse Oximetry BMI (Body Mass Index)       87 98.5  F (36.9  C) (Tympanic) 16 99% 30.42 kg/m2        Blood Pressure from Last 3 Encounters:   10/30/17 126/74   10/03/17 124/75   09/07/17 119/75    Weight from Last 3 Encounters:   10/30/17 206 lb (93.4 kg)   10/03/17 208 lb (94.3 kg)   09/07/17 208 lb (94.3 kg)              We Performed the Following     Beta strep group A culture     Strep, Rapid Screen          Today's Medication Changes          These changes are accurate as of: 10/30/17  4:21 PM.  If you have any questions, ask your nurse or doctor.               Start taking these medicines.        Dose/Directions    azithromycin 250 MG tablet   Commonly known as:  ZITHROMAX   Used for:  Acute bronchitis with symptoms > 10 days   Started by:  Hema Weaver MD        Two tablets first day, then one tablet daily for four days.   Quantity:  6 tablet   Refills:  0            Where to get your medicines      These medications were sent to [facility] [address] [place] [address]  [address] [id number]     Phone:  [phone]     azithromycin 250 MG tablet                Primary Care Provider Office Phone # Fax #    Hema Weaver [phone]       [address] [place] [id number]        Equal Access to Services     MARGARETH KIM AH: Hadleyla cox hadbillieo Soese, waaxda luqadaha, qaybta kaalmada jacqueline, ella javed. So [facility] [phone].    ATENCIÓN: Si habla español, tiene a green disposición servicios gratuitos de asistencia lingüística. Adolfo al [phone].    We comply with applicable federal civil rights laws and [place] laws. We do not discriminate on the basis of race, color, national origin, age, disability, sex, sexual orientation, or gender identity.            Thank you!     Thank you for choosing [facility]  for your care. Our goal is always to provide you with excellent  care. Hearing back from our patients is one way we can continue to improve our services. Please take a few minutes to complete the written survey that you may receive in the mail after your visit with us. Thank you!             Your Updated Medication List - Protect others around you: Learn how to safely use, store and throw away your medicines at www.disposemymeds.org.          This list is accurate as of: 10/30/17  4:21 PM.  Always use your most recent med list.                   Brand Name Dispense Instructions for use Diagnosis    azithromycin 250 MG tablet    ZITHROMAX    6 tablet    Two tablets first day, then one tablet daily for four days.    Acute bronchitis with symptoms > 10 days       fluticasone 50 MCG/ACT spray    FLONASE    1 Bottle    Spray 1 spray into both nostrils daily NEEDS OFFICE VISIT PRIOR TO FURTHER REFILL    Seasonal allergic rhinitis       order for DME     1 each    CPAP: 8 cm H2O  Lifetime need and heated humidity.    TERESA (obstructive sleep apnea)       ZYRTEC PO      OTC AS NEEDED

## 2017-10-31 LAB
BACTERIA SPEC CULT: NORMAL
SPECIMEN SOURCE: NORMAL

## 2018-02-11 DIAGNOSIS — J30.2 SEASONAL ALLERGIC RHINITIS: ICD-10-CM

## 2018-02-12 RX ORDER — FLUTICASONE PROPIONATE 50 MCG
SPRAY, SUSPENSION (ML) NASAL
Qty: 1 BOTTLE | Refills: 3 | Status: SHIPPED | OUTPATIENT
Start: 2018-02-12 | End: 2019-03-23

## 2018-02-12 NOTE — TELEPHONE ENCOUNTER
"Requested Prescriptions   Pending Prescriptions Disp Refills     fluticasone (FLONASE) 50 MCG/ACT spray [Pharmacy Med Name: FLUTICASONE 50MCG   SPR]  0     Sig: USE ONE SPRAY(S) IN EACH NOSTRIL ONCE DAILY *  NEEDS  OFFICE  VISIT  PRIOR  TO  FURTHER  REFILL  *    Inhaled Steroids Protocol Passed    2/11/2018  8:27 AM       Passed - Patient is age 12 or older       Passed - Recent or future visit with authorizing provider's specialty    Patient had office visit in the last year or has a visit in the next 30 days with authorizing provider.  See \"Patient Info\" tab in inbasket, or \"Choose Columns\" in Meds & Orders section of the refill encounter.             fluticasone (FLONASE) 50 MCG/ACT spray  Last Written Prescription Date:  09/25/2017  Last Fill Quantity: 1 bottle,  # refills: 0   Last office visit: 10/30/2017 with prescribing provider:  10/30/2017 with Francesca CHAVEZ   Future Office Visit:      Claire MAY (R) (M)      "

## 2018-04-26 ENCOUNTER — RADIANT APPOINTMENT (OUTPATIENT)
Dept: GENERAL RADIOLOGY | Facility: CLINIC | Age: 53
End: 2018-04-26
Attending: FAMILY MEDICINE
Payer: COMMERCIAL

## 2018-04-26 ENCOUNTER — OFFICE VISIT (OUTPATIENT)
Dept: FAMILY MEDICINE | Facility: CLINIC | Age: 53
End: 2018-04-26
Payer: COMMERCIAL

## 2018-04-26 VITALS
HEART RATE: 44 BPM | BODY MASS INDEX: 30.31 KG/M2 | OXYGEN SATURATION: 96 % | WEIGHT: 200 LBS | HEIGHT: 68 IN | TEMPERATURE: 98.8 F | DIASTOLIC BLOOD PRESSURE: 74 MMHG | SYSTOLIC BLOOD PRESSURE: 126 MMHG

## 2018-04-26 DIAGNOSIS — D17.1 LIPOMA OF TORSO: ICD-10-CM

## 2018-04-26 DIAGNOSIS — R07.81 RIB PAIN ON LEFT SIDE: ICD-10-CM

## 2018-04-26 DIAGNOSIS — R07.81 RIB PAIN ON LEFT SIDE: Primary | ICD-10-CM

## 2018-04-26 PROCEDURE — 71101 X-RAY EXAM UNILAT RIBS/CHEST: CPT | Mod: LT

## 2018-04-26 PROCEDURE — 99214 OFFICE O/P EST MOD 30 MIN: CPT | Performed by: FAMILY MEDICINE

## 2018-04-26 NOTE — MR AVS SNAPSHOT
After Visit Summary   4/26/2018    Emanuel Landeros    MRN: [id number]           Patient Information     Date Of Birth          1965        Visit Information        Provider Department      4/26/2018 2:20 PM Spenser Cm MD [facility]        Today's Diagnoses     Rib pain on left side    -  1    Lipoma of torso          Care Instructions          Thank you for choosing [facility].  You may be receiving a survey in the mail from [facility] regarding your visit today.  Please take a few minutes to complete and return the survey to let us know how we are doing.      If you have questions or concerns, please contact us via [employer] or you can contact your care team at [phone].    Our Clinic hours are:  Monday 6:40 am  to 7:00 pm  Tuesday -Friday 6:40 am to 5:00 pm    The [place] outpatient lab hours are:  Monday - Friday 6:10 am to 4:45 pm  Saturdays 7:00 am to 11:00 am  Appointments are required, call [phone]    If you have clinical questions after hours or would like to schedule an appointment,  call the clinic at [phone].      (R07.81) Rib pain on left side  (primary encounter diagnosis)  Comment:   Plan: XR Ribs & Chest Left G/E 3 Views        We discussed the issues and ordered the left rib x rays. We will call the results. Use advil and Tylenol as needed for pain.     (D17.1) Lipoma of torso  Comment:   Plan: GENERAL SURG ADULT REFERRAL        Call [phone] or go to [facility] MONTEIRO for the scheduling of the surgery referral. This is for symptomatic reasons: it is painful with bending.             Follow-ups after your visit        Additional Services     GENERAL SURG ADULT REFERRAL       Your provider has referred you to: FM[facility]: [facility] [phone]   [email or web address]/    Please be aware that coverage of these services is subject to the terms and limitations of your health insurance plan.  Call member services  "at your health plan with any benefit or coverage questions.      Please bring the following with you to your appointment:    (1) Any X-Rays, CTs or MRIs which have been performed.  Contact the facility where they were done to arrange for  prior to your scheduled appointment.   (2) List of current medications   (3) This referral request   (4) Any documents/labs given to you for this referral                  Future tests that were ordered for you today     Open Future Orders        Priority Expected Expires Ordered    XR Ribs & Chest Left G/E 3 Views Routine 4/26/2018 4/26/2019 4/26/2018            Who to contact     If you have questions or need follow up information about today's clinic visit or your schedule please contact [facility] directly at [phone].  Normal or non-critical lab and imaging results will be communicated to you by MyChart, letter or phone within 4 business days after the clinic has received the results. If you do not hear from us within 7 days, please contact the clinic through MyChart or phone. If you have a critical or abnormal lab result, we will notify you by phone as soon as possible.  Submit refill requests through [employer] or call your pharmacy and they will forward the refill request to us. Please allow 3 business days for your refill to be completed.          Additional Information About Your Visit        [employer] Information     [employer] lets you send messages to your doctor, view your test results, renew your prescriptions, schedule appointments and more. To sign up, go to [email or web address] . Click on \"Log in\" on the left side of the screen, which will take you to the Welcome page. Then click on \"Sign up Now\" on the right side of the page.     You will be asked to enter the access code listed below, as well as some personal information. Please follow the directions to create your username and password.     Your access code is: [email or web address]  Expires: " "2018  3:29 PM     Your access code will  in 90 days. If you need help or a new code, please call your [place] clinic or [phone].        Care EveryWhere ID     This is your Care EveryWhere ID. This could be used by other organizations to access your [place] medical records  [id number]        Your Vitals Were     Pulse Temperature Height Pulse Oximetry BMI (Body Mass Index)       44 98.8  F (37.1  C) (Tympanic) 5' 8\" (1.727 m) 96% 30.41 kg/m2        Blood Pressure from Last 3 Encounters:   18 126/74   10/30/17 126/74   10/03/17 124/75    Weight from Last 3 Encounters:   18 200 lb (90.7 kg)   10/30/17 206 lb (93.4 kg)   10/03/17 208 lb (94.3 kg)              We Performed the Following     GENERAL SURG ADULT REFERRAL        Primary Care Provider Office Phone # Fax #    Hema Ashutosh Weaver [phone]       [address] [place] [address]        Equal Access to Services     [facility]: Hadii matt cox hadmelanie Soese, waaxda luqadaha, qaybta kaalelver phillips, ella javed. So [facility] [phone].    ATENCIÓN: Si habla español, tiene a green disposición servicios gratuitos de asistencia lingüística. [facility] [phone].    We comply with applicable federal civil rights laws and [place] laws. We do not discriminate on the basis of race, color, national origin, age, disability, sex, sexual orientation, or gender identity.            Thank you!     Thank you for choosing [facility]  for your care. Our goal is always to provide you with excellent care. Hearing back from our patients is one way we can continue to improve our services. Please take a few minutes to complete the written survey that you may receive in the mail after your visit with us. Thank you!             Your Updated Medication List - Protect others around you: Learn how to safely use, store and throw away your medicines at www.disposemymeds.org.          This list " is accurate as of 4/26/18  3:29 PM.  Always use your most recent med list.                   Brand Name Dispense Instructions for use Diagnosis    fluticasone 50 MCG/ACT spray    FLONASE    1 Bottle    USE ONE SPRAY(S) IN EACH NOSTRIL ONCE DAILY *  NEEDS  OFFICE  VISIT  PRIOR  TO  FURTHER  REFILL  *    Seasonal allergic rhinitis       order for DME     1 each    CPAP: 8 cm H2O  Lifetime need and heated humidity.    TERESA (obstructive sleep apnea)       ZYRTEC PO      OTC AS NEEDED

## 2018-04-26 NOTE — NURSING NOTE
"Chief Complaint   Patient presents with     Abdominal Pain     Left abdominal pain for 2 weeks.  Bulging area on the left side for one month.       Initial /74  Pulse (!) 42  Temp 98.8  F (37.1  C) (Tympanic)  Ht 5' 8\" (1.727 m)  Wt 200 lb (90.7 kg)  BMI 30.41 kg/m2 Estimated body mass index is 30.41 kg/(m^2) as calculated from the following:    Height as of this encounter: 5' 8\" (1.727 m).    Weight as of this encounter: 200 lb (90.7 kg).  Medication Reconciliation: complete  "

## 2018-04-26 NOTE — PROGRESS NOTES
"  SUBJECTIVE:   Emanuel Landeros is a 52 year old male who presents to clinic today for the following health issues:      Abdominal Pain  Bulge in the abdominal area      Duration: Pain for the last two weeks.  Bulging area for the last month.    Description (location/character/radiation): Left abdominal pain.  Bulging area for the left side.    Worse with bending over.  Will be a sharp pain.       Associated flank pain: None    Intensity:  mild    Accompanying signs and symptoms:        Fever/Chills: no        Gas/Bloating: YES- Some bloating and gas.       Nausea/vomitting: no        Diarrhea: no        Dysuria or Hematuria: no     History (previous similar pain/trauma/previous testing): None.    Precipitating or alleviating factors:       Pain worse with eating/BM/urination: None       Pain relieved by BM: no     Therapies tried and outcome: None    LMP:  not applicable      PULSE:  Pulse today is 44 on the Oximeter, 42 on the blood pressure machine.  He states there has been some dizziness in the month that he feels may have been related to illness.  States his pulse can run lower. He is not lightheaded.       Current Outpatient Prescriptions:      fluticasone (FLONASE) 50 MCG/ACT spray, USE ONE SPRAY(S) IN EACH NOSTRIL ONCE DAILY *  NEEDS  OFFICE  VISIT  PRIOR  TO  FURTHER  REFILL  *, Disp: 1 Bottle, Rfl: 3     ORDER FOR DME, CPAP: 8 cm H2O  Lifetime need and heated humidity.   , Disp: 1 each, Rfl:      ZYRTEC PO, OTC AS NEEDED, Disp: , Rfl:     Patient Active Problem List   Diagnosis     Testosterone deficiency     CARDIOVASCULAR SCREENING; LDL GOAL LESS THAN 160     TERESA (obstructive sleep apnea)       Blood pressure 126/74, pulse (!) 44, temperature 98.8  F (37.1  C), temperature source Tympanic, height 5' 8\" (1.727 m), weight 200 lb (90.7 kg), SpO2 96 %.    Exam:  GENERAL APPEARANCE: healthy, alert and no distress  EYES: EOMI,  PERRL  ABDOMEN:  soft, nontender, no HSM or masses and bowel sounds normal  MS: the " left lower anterior ribs are minimally tender and may protrude more than the right side.   SKIN: on the left lower anterior rib area there is a subcutaneous swelling that is soft and not red or tender 13 by 10 cm      (R07.81) Rib pain on left side  (primary encounter diagnosis)  Comment:   Plan: XR Ribs & Chest Left G/E 3 Views        We discussed the issues and ordered the left rib x rays. We will call the results. Use advil and Tylenol as needed for pain.     (D17.1) Lipoma of torso  Comment:   Plan: GENERAL SURG ADULT REFERRAL        Call [phone] or go to [facility]  for the scheduling of the surgery referral. This is for symptomatic reasons: it is painful with bending.     Spenser Cm

## 2018-04-26 NOTE — NURSING NOTE
"Chief Complaint   Patient presents with     Abdominal Pain     Left abdominal pain for 2 weeks.  Bulging area on the left side for one month.       Initial /74  Pulse (!) 44  Temp 98.8  F (37.1  C) (Tympanic)  Ht 5' 8\" (1.727 m)  Wt 200 lb (90.7 kg)  SpO2 96%  BMI 30.41 kg/m2 Estimated body mass index is 30.41 kg/(m^2) as calculated from the following:    Height as of this encounter: 5' 8\" (1.727 m).    Weight as of this encounter: 200 lb (90.7 kg).  Medication Reconciliation: complete  "

## 2018-04-26 NOTE — PATIENT INSTRUCTIONS
Thank you for choosing [facility].  You may be receiving a survey in the mail from Kurtis Smith regarding your visit today.  Please take a few minutes to complete and return the survey to let us know how we are doing.      If you have questions or concerns, please contact us via [employer] or you can contact your care team at [phone].    Our Clinic hours are:  Monday 6:40 am  to 7:00 pm  Tuesday -Friday 6:40 am to 5:00 pm    The [place] outpatient lab hours are:  Monday - Friday 6:10 am to 4:45 pm  Saturdays 7:00 am to 11:00 am  Appointments are required, call [phone]    If you have clinical questions after hours or would like to schedule an appointment,  call the clinic at [phone].      (R07.81) Rib pain on left side  (primary encounter diagnosis)  Comment:   Plan: XR Ribs & Chest Left G/E 3 Views        We discussed the issues and ordered the left rib x rays. We will call the results. Use advil and Tylenol as needed for pain.     (D17.1) Lipoma of torso  Comment:   Plan: GENERAL SURG ADULT REFERRAL        Call [phone] or go to [facility] MONTEIRO for the scheduling of the surgery referral. This is for symptomatic reasons: it is painful with bending.

## 2018-04-27 ENCOUNTER — OFFICE VISIT (OUTPATIENT)
Dept: SURGERY | Facility: CLINIC | Age: 53
End: 2018-04-27
Payer: COMMERCIAL

## 2018-04-27 ENCOUNTER — TELEPHONE (OUTPATIENT)
Dept: SURGERY | Facility: CLINIC | Age: 53
End: 2018-04-27

## 2018-04-27 VITALS
HEART RATE: 45 BPM | SYSTOLIC BLOOD PRESSURE: 134 MMHG | TEMPERATURE: 97.9 F | DIASTOLIC BLOOD PRESSURE: 82 MMHG | HEIGHT: 68 IN | BODY MASS INDEX: 30.31 KG/M2 | RESPIRATION RATE: 16 BRPM | WEIGHT: 200 LBS

## 2018-04-27 DIAGNOSIS — D17.1 LIPOMA OF ABDOMINAL WALL: Primary | ICD-10-CM

## 2018-04-27 PROCEDURE — 99203 OFFICE O/P NEW LOW 30 MIN: CPT | Performed by: SURGERY

## 2018-04-27 NOTE — MR AVS SNAPSHOT
"              After Visit Summary   4/27/2018    Emanuel Landeros    MRN: [id number]           Patient Information     Date Of Birth          1965        Visit Information        Provider Department      4/27/2018 2:30 PM Froylan Ayala MD [facility]        Today's Diagnoses     Lipoma of abdominal wall    -  1      Care Instructions    Will work on scheduling surgery to remove the left abdominal lipoma            Follow-ups after your visit        Your next 10 appointments already scheduled     Apr 27, 2018  2:30 PM CDT   New Visit with Froylan Ayala MD   [facility] ([facility]    [address] [address]   [phone]              Who to contact     If you have questions or need follow up information about today's clinic visit or your schedule please contact [facility] directly at [phone].  Normal or non-critical lab and imaging results will be communicated to you by MyChart, letter or phone within 4 business days after the clinic has received the results. If you do not hear from us within 7 days, please contact the clinic through MyChart or phone. If you have a critical or abnormal lab result, we will notify you by phone as soon as possible.  Submit refill requests through [employer] or call your pharmacy and they will forward the refill request to us. Please allow 3 business days for your refill to be completed.          Additional Information About Your Visit        MyChart Information     [employer] lets you send messages to your doctor, view your test results, renew your prescriptions, schedule appointments and more. To sign up, go to [email or web address] . Click on \"Log in\" on the left side of the screen, which will take you to the Welcome page. Then click on \"Sign up Now\" on the right side of the page.     You will be asked to enter the access code listed below, as well as some personal information. Please " "follow the directions to create your username and password.     Your access code is: [email or web address]  Expires: 2018  3:29 PM     Your access code will  in 90 days. If you need help or a new code, please call your [facility] clinic or [phone].        Care EveryWhere ID     This is your Care EveryWhere ID. This could be used by other organizations to access your [facility] medical records  [id number]        Your Vitals Were     Pulse Temperature Respirations Height BMI (Body Mass Index)       45 97.9  F (36.6  C) (Tympanic) 16 1.727 m (5' 8\") 30.41 kg/m2        Blood Pressure from Last 3 Encounters:   18 134/82   18 126/74   10/30/17 126/74    Weight from Last 3 Encounters:   18 90.7 kg (200 lb)   18 90.7 kg (200 lb)   10/30/17 93.4 kg (206 lb)              We Performed the Following     [facility]-Operative Worksheet        Primary Care Provider Office Phone # Fax #    Hema Ur MD Meg [phone]       [address] [place] [address]        Equal Access to Services     MARGARETH KIM AH: Hadii matt ku hadasho Soomaali, waaxda luqadaha, qaybta kaalmada adeegyada, ella javed. So [facility] [phone].    ATENCIÓN: Si habla español, tiene a green disposición servicios gratuitos de asistencia lingüística. Llame al [phone].    We comply with applicable federal civil rights laws and [place] laws. We do not discriminate on the basis of race, color, national origin, age, disability, sex, sexual orientation, or gender identity.            Thank you!     Thank you for choosing [facility]  for your care. Our goal is always to provide you with excellent care. Hearing back from our patients is one way we can continue to improve our services. Please take a few minutes to complete the written survey that you may receive in the mail after your visit with us. Thank you!             Your Updated Medication List - Protect others around you: Learn " how to safely use, store and throw away your medicines at www.disposemymeds.org.          This list is accurate as of 4/27/18  2:29 PM.  Always use your most recent med list.                   Brand Name Dispense Instructions for use Diagnosis    fluticasone 50 MCG/ACT spray    FLONASE    1 Bottle    USE ONE SPRAY(S) IN EACH NOSTRIL ONCE DAILY *  NEEDS  OFFICE  VISIT  PRIOR  TO  FURTHER  REFILL  *    Seasonal allergic rhinitis       order for DME     1 each    CPAP: 8 cm H2O  Lifetime need and heated humidity.    TERESA (obstructive sleep apnea)       ZYRTEC PO      OTC AS NEEDED

## 2018-04-27 NOTE — TELEPHONE ENCOUNTER
Type of surgery: Excision soft tissue mass left abdomen   Location of surgery: [place] OR  Date and time of surgery: 05/09/2018 @ 1200  Surgeon: Jamie   Pre-Op Appt Date: 05/02/2018 @ 3:40PM  Post-Op Appt Date: 05/22/2018 @ 1245   Packet sent out: Yes- given in clinic   Pre-cert/Authorization completed:  Not Applicable  Date: April 27, 2018    Betty FRANKLIN CMA

## 2018-04-27 NOTE — PROGRESS NOTES
Patient seen in consultation for lipoma by Spenser Cm MD    HPI:  Patient is a 52 year old male  with complaints of left sided chest/abdominal pain and lump under skin in the area  Lump been there few months  Pain in the past few days. Has not done any particular activity that might have caused. No trauma  No similar lesions elsewhere  Had a bakers cyst as a child  Bending over causes the pain. Some soreness now even just sitting  Patient has family history of cancer problems- father with prostate. Mother with ovarian and brain    Review Of Systems    Skin: as above  Ears/Nose/Throat: negative  Respiratory: No shortness of breath, dyspnea on exertion, cough, or hemoptysis  Cardiovascular: negative  Gastrointestinal: negative  Genitourinary: negative  Musculoskeletal: negative  Neurologic: some tingling left chest area. No numbness  Hematologic/Lymphatic/Immunologic: negative  Endocrine: negative      History reviewed. No pertinent past medical history.    Past Surgical History:   Procedure Laterality Date     C6-C7 fusion  2006     [facility]        Social History     Social History     Marital status:      Spouse name: N/A     Number of children: N/A     Years of education: N/A     Occupational History     Not on file.     Social History Main Topics     Smoking status: Former Smoker     Quit date: 6/17/2003     Smokeless tobacco: Never Used     Alcohol use Yes      Comment: minimal     Drug use: No      Comment: daniella     Sexual activity: Yes     Partners: Female     Other Topics Concern     Not on file     Social History Narrative       Current Outpatient Prescriptions   Medication Sig Dispense Refill     fluticasone (FLONASE) 50 MCG/ACT spray USE ONE SPRAY(S) IN EACH NOSTRIL ONCE DAILY *  NEEDS  OFFICE  VISIT  PRIOR  TO  FURTHER  REFILL  * 1 Bottle 3     ORDER FOR DME CPAP:  8 cm H2O    Lifetime need and heated humidity.      1 each      ZYRTEC PO OTC AS NEEDED         Medications and history  "reviewed    Physical exam:  Vitals: /82 (BP Location: Right arm, Patient Position: Chair, Cuff Size: Adult Regular)  Pulse (!) 45  Temp 97.9  F (36.6  C) (Tympanic)  Resp 16  Ht 1.727 m (5' 8\")  Wt 90.7 kg (200 lb)  BMI 30.41 kg/m2  BMI= Body mass index is 30.41 kg/(m^2).    Constitutional: healthy, alert and no distress  Head: Normocephalic. No masses, lesions, tenderness or abnormalities  Cardiovascular: negative, PMI normal. No lifts, heaves, or thrills. RRR. No murmurs, clicks gallops or rub  Respiratory: negative, Percussion normal. Good diaphragmatic excursion. Lungs clear  Gastrointestinal: Abdomen soft, non-tender. BS normal. LUQ over costal margin is an ill defined soft tissue mass. Perhaps about 8x6 cm though unable to really feel an edge. No rash  : Deferred  Musculoskeletal: extremities normal- no gross deformities noted, gait normal and normal muscle tone  Skin: see abdomen  Psychiatric: mentation appears normal and affect normal/bright  Patient able to get up on table without difficulty.      Imaging shows: personally viewed  LEFT RIBS WITH CHEST THREE VIEWS   4/26/2018 3:39 PM      HISTORY: Rib pain on left side.     COMPARISON: 12/2/2016.         IMPRESSION: PA chest shows no active cardiopulmonary disease. Left rib  detail views are unremarkable.    Assessment:     ICD-10-CM    1. Lipoma of abdominal wall D17.1 [facility]-Operative Worksheet     Plan: Suspect mass is lipoma. Good size so will plan on excision in OR with MAC. Questions answered and will work on scheduling.    Froylan Ayala MD    "

## 2018-04-27 NOTE — LETTER
4/27/2018         RE: Emanuel Landeros  [address]  [facility] [address]        Dear Colleague,    Thank you for referring your patient, Emanuel Landeros, to the [facility]. Please see a copy of my visit note below.    Patient seen in consultation for lipoma by Spenser Cm MD    HPI:  Patient is a 52 year old male  with complaints of left sided chest/abdominal pain and lump under skin in the area  Lump been there few months  Pain in the past few days. Has not done any particular activity that might have caused. No trauma  No similar lesions elsewhere  Had a bakers cyst as a child  Bending over causes the pain. Some soreness now even just sitting  Patient has family history of cancer problems- father with prostate. Mother with ovarian and brain    Review Of Systems    Skin: as above  Ears/Nose/Throat: negative  Respiratory: No shortness of breath, dyspnea on exertion, cough, or hemoptysis  Cardiovascular: negative  Gastrointestinal: negative  Genitourinary: negative  Musculoskeletal: negative  Neurologic: some tingling left chest area. No numbness  Hematologic/Lymphatic/Immunologic: negative  Endocrine: negative      History reviewed. No pertinent past medical history.    Past Surgical History:   Procedure Laterality Date     C6-C7 fusion  [address] [place] [address]        Social History     Social History     Marital status:      Spouse name: N/A     Number of children: N/A     Years of education: N/A     Occupational History     Not on file.     Social History Main Topics     Smoking status: Former Smoker     Quit date: 6/17/2003     Smokeless tobacco: Never Used     Alcohol use Yes      Comment: minimal     Drug use: No      Comment: maraayleen     Sexual activity: Yes     Partners: Female     Other Topics Concern     Not on file     Social History Narrative       Current Outpatient Prescriptions   Medication Sig Dispense Refill     fluticasone (FLONASE) 50 MCG/ACT spray USE ONE SPRAY(S) IN EACH  "NOSTRIL ONCE DAILY *  NEEDS  OFFICE  VISIT  PRIOR  TO  FURTHER  REFILL  * 1 Bottle 3     ORDER FOR DME CPAP:  8 cm H2O    Lifetime need and heated humidity.      1 each      ZYRTEC PO OTC AS NEEDED         Medications and history reviewed    Physical exam:  Vitals: /82 (BP Location: Right arm, Patient Position: Chair, Cuff Size: Adult Regular)  Pulse (!) 45  Temp 97.9  F (36.6  C) (Tympanic)  Resp 16  Ht 1.727 m (5' 8\")  Wt 90.7 kg (200 lb)  BMI 30.41 kg/m2  BMI= Body mass index is 30.41 kg/(m^2).    Constitutional: healthy, alert and no distress  Head: Normocephalic. No masses, lesions, tenderness or abnormalities  Cardiovascular: negative, PMI normal. No lifts, heaves, or thrills. RRR. No murmurs, clicks gallops or rub  Respiratory: negative, Percussion normal. Good diaphragmatic excursion. Lungs clear  Gastrointestinal: Abdomen soft, non-tender. BS normal. LUQ over costal margin is an ill defined soft tissue mass. Perhaps about 8x6 cm though unable to really feel an edge. No rash  : Deferred  Musculoskeletal: extremities normal- no gross deformities noted, gait normal and normal muscle tone  Skin: see abdomen  Psychiatric: mentation appears normal and affect normal/bright  Patient able to get up on table without difficulty.      Imaging shows: personally viewed  LEFT RIBS WITH CHEST THREE VIEWS   4/26/2018 3:39 PM      HISTORY: Rib pain on left side.     COMPARISON: 12/2/2016.         IMPRESSION: PA chest shows no active cardiopulmonary disease. Left rib  detail views are unremarkable.    Assessment:     ICD-10-CM    1. Lipoma of abdominal wall D17.1 [facility]-Operative Worksheet     Plan: Suspect mass is lipoma. Good size so will plan on excision in OR with MAC. Questions answered and will work on scheduling.    Froylan Ayala MD      Again, thank you for allowing me to participate in the care of your patient.        Sincerely,        Froylan Ayala MD    "

## 2018-04-27 NOTE — NURSING NOTE
"Chief Complaint   Patient presents with     Consult     Lipoma-Left chest        Initial /80 (BP Location: Right arm, Patient Position: Chair, Cuff Size: Adult Regular)  Pulse (!) 45  Temp 97.9  F (36.6  C) (Tympanic)  Resp 16  Ht 1.727 m (5' 8\")  Wt 90.7 kg (200 lb)  BMI 30.41 kg/m2 Estimated body mass index is 30.41 kg/(m^2) as calculated from the following:    Height as of this encounter: 1.727 m (5' 8\").    Weight as of this encounter: 90.7 kg (200 lb).  BP completed using cuff size: regular  Medications and allergies reviewed.      Betty FRANKLIN CMA     "

## 2018-05-02 ENCOUNTER — OFFICE VISIT (OUTPATIENT)
Dept: FAMILY MEDICINE | Facility: CLINIC | Age: 53
End: 2018-05-02
Payer: COMMERCIAL

## 2018-05-02 VITALS
HEART RATE: 46 BPM | RESPIRATION RATE: 16 BRPM | BODY MASS INDEX: 30.31 KG/M2 | WEIGHT: 200 LBS | SYSTOLIC BLOOD PRESSURE: 132 MMHG | DIASTOLIC BLOOD PRESSURE: 84 MMHG | HEIGHT: 68 IN | TEMPERATURE: 98.1 F

## 2018-05-02 DIAGNOSIS — D17.1 LIPOMA OF ABDOMINAL WALL: ICD-10-CM

## 2018-05-02 DIAGNOSIS — G47.33 OSA (OBSTRUCTIVE SLEEP APNEA): ICD-10-CM

## 2018-05-02 DIAGNOSIS — R00.1 SINUS BRADYCARDIA: ICD-10-CM

## 2018-05-02 DIAGNOSIS — Z01.818 PRE-OP EXAM: Primary | ICD-10-CM

## 2018-05-02 LAB
ERYTHROCYTE [DISTWIDTH] IN BLOOD BY AUTOMATED COUNT: 12.6 % (ref 10–15)
HCT VFR BLD AUTO: 40.9 % (ref 40–53)
HGB BLD-MCNC: 14.1 G/DL (ref 13.3–17.7)
MCH RBC QN AUTO: 31.4 PG (ref 26.5–33)
MCHC RBC AUTO-ENTMCNC: 34.5 G/DL (ref 31.5–36.5)
MCV RBC AUTO: 91 FL (ref 78–100)
PLATELET # BLD AUTO: 251 10E9/L (ref 150–450)
RBC # BLD AUTO: 4.49 10E12/L (ref 4.4–5.9)
WBC # BLD AUTO: 7.5 10E9/L (ref 4–11)

## 2018-05-02 PROCEDURE — 93000 ELECTROCARDIOGRAM COMPLETE: CPT | Performed by: NURSE PRACTITIONER

## 2018-05-02 PROCEDURE — 36415 COLL VENOUS BLD VENIPUNCTURE: CPT | Performed by: NURSE PRACTITIONER

## 2018-05-02 PROCEDURE — 85027 COMPLETE CBC AUTOMATED: CPT | Performed by: NURSE PRACTITIONER

## 2018-05-02 PROCEDURE — 99215 OFFICE O/P EST HI 40 MIN: CPT | Performed by: NURSE PRACTITIONER

## 2018-05-02 NOTE — PROGRESS NOTES
[facility]  [address] [address]  [phone]  Dept: [phone]    PRE-OP EVALUATION:  Today's date: 2018    Emanuel Landeros (: 1965) presents for pre-operative evaluation assessment as requested by Dr. Ayala .  He requires evaluation and anesthesia risk assessment prior to undergoing surgery/procedure for treatment of Abdominal wall lipoma  .    Proposed Surgery/ Procedure: Lipoma of abdominal wall   Date of Surgery/ Procedure: 2018   Time of Surgery/ Procedure: 10:00 AM  Hospital/Surgical Facility: [facility]  Primary Physician: Hema Weaver  Type of Anesthesia Anticipated: General    Patient has a Health Care Directive or Living Will:  NO    1. NO - Do you have a history of heart attack, stroke, stent, bypass or surgery on an artery in the head, neck, heart or legs?  2. NO - Do you ever have any pain or discomfort in your chest?  3. NO - Do you have a history of  Heart Failure?  4. NO - Are you troubled by shortness of breath when: walking on the level, up a slight hill or at night?  5. NO - Do you currently have a cold, bronchitis or other respiratory infection?  6. NO - Do you have a cough, shortness of breath or wheezing?  7. NO - Do you sometimes get pains in the calves of your legs when you walk?  8. NO - Do you or anyone in your family have previous history of blood clots?  9. NO - Do you or does anyone in your family have a serious bleeding problem such as prolonged bleeding following surgeries or cuts?  10. NO - Have you ever had problems with anemia or been told to take iron pills?  11. NO - Have you had any abnormal blood loss such as black, tarry or bloody stools, or abnormal vaginal bleeding?  12. NO - Have you ever had a blood transfusion?  13. NO - Have you or any of your relatives ever had problems with anesthesia?  14. YES - DO YOU HAVE SLEEP APNEA, EXCESSIVE SNORING OR DAYTIME DROWSINESS? Has sleep apnea, uses C pap   15. NO - Do you  have any prosthetic heart valves?  16. NO - Do you have prosthetic joints?  17. NO - Is there any chance that you may be pregnant?      HPI:     HPI related to upcoming procedure: Patient noted a LEFT UPPER QUADRANT lump that is painful if he is bending over about a month ago    Cardiac:  Historically has had low heart rate. He had some dizziness 2 months ago (attributed it to Flu). He is now noting some fatigue with activity. He does relay he is not sleeping well with CPAP- usually 4 hours at a time at best.     See problem list for active medical problems.  Problems all longstanding and stable, except as noted/documented.  See ROS for pertinent symptoms related to these conditions.                                                                                                                                                          .  MEDICAL HISTORY:     Patient Active Problem List    Diagnosis Date Noted     TERESA (obstructive sleep apnea) 07/24/2012     Priority: Medium     7/24/2012: Mild TERESA (AHI ~11, eloy desat ~88%, strong positional component)       CARDIOVASCULAR SCREENING; LDL GOAL LESS THAN 160 10/31/2010     Priority: Medium     Testosterone deficiency 06/28/2010     Priority: Medium      History reviewed. No pertinent past medical history.  Past Surgical History:   Procedure Laterality Date     C6-C7 fusion  2006     [facility]      Current Outpatient Prescriptions   Medication Sig Dispense Refill     fluticasone (FLONASE) 50 MCG/ACT spray USE ONE SPRAY(S) IN EACH NOSTRIL ONCE DAILY *  NEEDS  OFFICE  VISIT  PRIOR  TO  FURTHER  REFILL  * 1 Bottle 3     ORDER FOR DME CPAP:  8 cm H2O    Lifetime need and heated humidity.      1 each      ZYRTEC PO OTC AS NEEDED       OTC products: None, except as noted above    No Known Allergies   Latex Allergy: NO    Social History   Substance Use Topics     Smoking status: Former Smoker     Quit date: 6/17/2003     Smokeless tobacco: Never Used     Alcohol use Yes       "Comment: minimal     History   Drug Use No     Comment: [email or web address]       REVIEW OF SYSTEMS:   CONSTITUTIONAL: NEGATIVE for fever, chills, change in weight  INTEGUMENTARY/SKIN: NEGATIVE for worrisome rashes, moles or lesions  EYES: NEGATIVE for vision changes or irritation- reading glasses  ENT/MOUTH: NEGATIVE for ear, mouth and throat problems- sometimes ringing of ears  RESP: NEGATIVE for significant cough or SOB  BREAST: NEGATIVE for masses, tenderness or discharge  CV: NEGATIVE for chest pain, palpitations or peripheral edema, low heart rate, noting exercise intolerance, and increased fatigue  GI: NEGATIVE for nausea, abdominal pain, heartburn, or change in bowel habits, mass to LEFT UPPER QUADRANT only painful when he bends over X 1 month  : NEGATIVE for frequency, dysuria, or hematuria  MUSCULOSKELETAL: NEGATIVE for significant arthralgias or myalgia  NEURO: NEGATIVE for weakness, dizziness or paresthesias  ENDOCRINE: NEGATIVE for temperature intolerance, skin/hair changes  HEME: NEGATIVE for bleeding problems  PSYCHIATRIC: NEGATIVE for changes in mood or affect    EXAM:   /84 (BP Location: Right arm, Patient Position: Sitting, Cuff Size: Adult Large)  Pulse (!) 46  Temp 98.1  F (36.7  C) (Tympanic)  Resp 16  Ht 5' 8\" (1.727 m)  Wt 200 lb (90.7 kg)  BMI 30.41 kg/m2    GENERAL APPEARANCE: healthy, alert and no distress     EYES: EOMI,  PERRL     HENT: ear canals and TM's normal and nose and mouth without ulcers or lesions     NECK: no adenopathy, no asymmetry, masses, or scars and thyroid normal to palpation     RESP: lungs clear to auscultation - no rales, rhonchi or wheezes     CV: regular rates and rhythm, normal S1 S2, no S3 or S4 and grade 2/6 systolic murmur best heard over pulmonic area, click or rub     ABDOMEN:  soft, nontender, no HSM or masses and bowel sounds normal, ill-defined non-tender mass to LEFT UPPER QUADRANT covering edge of costal margin, perhaps 6 cmX 6 cm     MS: extremities " normal- no gross deformities noted, no evidence of inflammation in joints, FROM in all extremities.     SKIN: no suspicious lesions or rashes     NEURO: Normal strength and tone, sensory exam grossly normal, mentation intact and speech normal     PSYCH: mentation appears normal. and affect normal/bright     LYMPHATICS: No cervical adenopathy    DIAGNOSTICS:   EKG: sinus bradycardia, normal axis, normal intervals, no acute ST/T changes c/w ischemia, no LVH by voltage criteria, there are no prior tracings available  Results for orders placed or performed in visit on 05/02/18   CBC with platelets   Result Value Ref Range    WBC 7.5 4.0 - 11.0 10e9/L    RBC Count 4.49 4.4 - 5.9 10e12/L    Hemoglobin 14.1 13.3 - 17.7 g/dL    Hematocrit 40.9 40.0 - 53.0 %    MCV 91 78 - 100 fl    MCH 31.4 26.5 - 33.0 pg    MCHC 34.5 31.5 - 36.5 g/dL    RDW 12.6 10.0 - 15.0 %    Platelet Count 251 150 - 450 10e9/L     Interpretation Summary  Sinus bradycardia with normal resting blood pressure. With exercise there is a  normal heart rate and a hypertensive blood pressure response.  Average exercise capacity for age  Sinus bradycardia with no significant ECG abnormalities at rest, no ischemic  changes with exercise.  Normal resting LV function with good augmentation of all wall segments post  exercise.  This is a normal stress echo indicating low probability of significant  exercise-induced myocardial ischemia.     Functionally bicuspid aortic valve with fusion of the right and noncoronary  cusps.  Mild valvular aortic stenosis.  Mean gradient 14 mmHg, calculated aortic valve area 1.8 cmÂ  which is  consistent with its appearance.  The ascending aorta is Mildly dilated.  _____________________________________________________________________________  __     Stress  Exercise was stopped due to fatigue.  There was a borderline hypertensive BP response to exercise.  The patient exhibited no chest pain during exercise.  Target Heart Rate was  achieved.  The EKG portion of this stress test was negative for inducible ischemia (see  echo results below).  The visual ejection fraction is estimated at >70%.  Normal left ventricular function and wall motion at rest and post-stress.  Left ventricular cavity size decreases with exercise.  Global LV systolic function augments with exercise.     Baseline  Normal baseline electrocardiogram.  With sinus bradycardia.  Normal left ventricular function and wall motion at rest.  The visual ejection fraction is estimated at 60-65%.     Stress Results         Protocol:  MN HEART MANUEL        Maximum Predicted HR:   170 bpm         Target HR: 145 bpm               % Maximum Predicted HR: 92 %                Stage  DurationHeart Rate  BP            Comment                    (mm:ss)   (bpm)            Stage 1   3:00      85    150/80            Stage 2   3:00      98    170/68            Stage 3   3:00     116    200/68            Stage 4   2:17     157      /   [id number] FAC:ABOVE/DUKE 10           RECOVERYR  5:29      87    132/68                Stress Duration:   11:17 mm:ss        Recovery Time: 5:29 mm:ss          Maximum Stress HR: 157 bpm            METS:          13     Aortic Valve  Functionally bicuspid aortic valve with fusion of the right and noncoronary  cusps. Mild valvular aortic stenosis. Mean gradient 14 mmHg, calculated aortic  valve area 1.8 cmÂ  which is consistent with its appearance.        Vessels  The aortic root is normal size. The ascending aorta is Mildly dilated.  _____________________________________________________________________________  __  MMode/2D Measurements & Calculations     Ao root diam: 3.5 cm  asc Aorta Diam: 4.0 cm  LVOT diam: 2.5 cm  LVOT area: 4.9 cm2        Doppler Measurements & Calculations  Ao V2 max: 271.5 cm/sec  Ao max P.0 mmHg  Ao V2 mean: 173.0 cm/sec  Ao mean P.0 mmHg  Ao V2 VTI: 55.8 cm  RENETTA(I,D): 2.1 cm2  RENETTA(V,D): 1.8 cm2  LV V1 max PG: 3.9 mmHg  LV V1 max:  99.3 cm/sec  LV V1 VTI: 23.8 cm  SV(LVOT): 116.8 ml  SI(LVOT): 57.2 ml/m2  AV Alen Ratio (DI): 0.37  RENETTA Index (cm2/m2): 1.0              _____________________________________________________________________________  __        Report approved by: Earnestine Raman 05/07/2018 02:51 PM  IMPRESSION:   Reason for surgery/procedure: Lipoma of LEFT UPPER QUADRANT abdomen  Diagnosis/reason for consult: surgical risk    The proposed surgical procedure is considered INTERMEDIATE risk.    REVISED CARDIAC RISK INDEX  The patient has the following serious cardiovascular risks for perioperative complications such as (MI, PE, VFib and 3  AV Block):  No serious cardiac risks  INTERPRETATION: 0 risks: Class I (very low risk - 0.4% complication rate)    The patient has the following additional risks for perioperative complications:  No identified additional risks      ICD-10-CM    1. Pre-op exam Z01.818 CBC with platelets     EKG 12-lead complete w/read - Clinics   2. Lipoma of abdominal wall D17.1 CBC with platelets     EKG 12-lead complete w/read - Clinics   3. Sinus bradycardia R00.1 Exercise Stress Echocardiogram   4. TERESA (obstructive sleep apnea) G47.33        RECOMMENDATIONS:   -Patient is to take all scheduled medications on the day of surgery EXCEPT for modifications listed below.    PRE-OP IS APPROVED as of 1557 on 5/8/2018. VIOLETA Vargas      FURTHER TESTING NEEDED for new cardiac symptoms.  Exercise stress test to be done within the week. If negative, would recommend patient proceed to surgery without further evaluation.     Exercise stress test was normal as of 1557 on 5/8/2018. VIOLETA Vargas    Signed Electronically by: Charlene Ramos CNP    Copy of this evaluation report is provided to requesting physician.    [facility]    Revised Cardiac Risk Index    Patient Instructions     1. Pre-op exam  - CBC with platelets  - EKG 12-lead complete w/read - Clinics (sinus bradycardia  47 bpm)  Results for orders placed or performed in visit on 05/02/18   CBC with platelets   Result Value Ref Range    WBC 7.5 4.0 - 11.0 10e9/L    RBC Count 4.49 4.4 - 5.9 10e12/L    Hemoglobin 14.1 13.3 - 17.7 g/dL    Hematocrit 40.9 40.0 - 53.0 %    MCV 91 78 - 100 fl    MCH 31.4 26.5 - 33.0 pg    MCHC 34.5 31.5 - 36.5 g/dL    RDW 12.6 10.0 - 15.0 %    Platelet Count 251 150 - 450 10e9/L       2. Lipoma of abdominal wall  Acute  - CBC with platelets  - EKG 12-lead complete w/read - Clinics    3. Sinus bradycardia  Chronic, symptoms of fatigue/ mild exercise intolerance  - Exercise Stress Echocardiogram; Future in [place] ([facility] or Cariology department will call you tomorrow to schedule)  - Once stress test is done, and if normal, I'll okay your pre-op.   - If abnormal stress test, then referral to Cardiology in [place]    4. TERESA (obstructive sleep apnea)  Chronic, stable  - Continue CPAP    Before Your Surgery      Call your surgeon if there is any change in your health. This includes signs of a cold or flu (such as a sore throat, runny nose, cough, rash or fever).    Do not smoke, drink alcohol or take over the counter medicine (unless your surgeon or primary care doctor tells you to) for the 24 hours before and after surgery.    If you take prescribed drugs: Follow your doctor s orders about which medicines to take and which to stop until after surgery.    Eating and drinking prior to surgery: follow the instructions from your surgeon    Take a shower or bath the night before surgery. Use the soap your surgeon gave you to gently clean your skin. If you do not have soap from your surgeon, use your regular soap. Do not shave or scrub the surgery site.  Wear clean pajamas and have clean sheets on your bed.

## 2018-05-02 NOTE — PATIENT INSTRUCTIONS
1. Pre-op exam  - CBC with platelets  - EKG 12-lead complete w/read - Clinics (sinus bradycardia 47 bpm)  Results for orders placed or performed in visit on 05/02/18   CBC with platelets   Result Value Ref Range    WBC 7.5 4.0 - 11.0 10e9/L    RBC Count 4.49 4.4 - 5.9 10e12/L    Hemoglobin 14.1 13.3 - 17.7 g/dL    Hematocrit 40.9 40.0 - 53.0 %    MCV 91 78 - 100 fl    MCH 31.4 26.5 - 33.0 pg    MCHC 34.5 31.5 - 36.5 g/dL    RDW 12.6 10.0 - 15.0 %    Platelet Count 251 150 - 450 10e9/L       2. Lipoma of abdominal wall  Acute  - CBC with platelets  - EKG 12-lead complete w/read - Clinics    3. Sinus bradycardia  Chronic, symptoms of fatigue/ mild exercise intolerance  - Exercise Stress Echocardiogram; Future in [place] ([facility] or Cariology department will call you tomorrow to schedule)  - Once stress test is done, and if normal, I'll okay your pre-op.   - If abnormal stress test, then referral to Cardiology in [place]    4. TERESA (obstructive sleep apnea)  Chronic, stable  - Continue CPAP    Before Your Surgery      Call your surgeon if there is any change in your health. This includes signs of a cold or flu (such as a sore throat, runny nose, cough, rash or fever).    Do not smoke, drink alcohol or take over the counter medicine (unless your surgeon or primary care doctor tells you to) for the 24 hours before and after surgery.    If you take prescribed drugs: Follow your doctor s orders about which medicines to take and which to stop until after surgery.    Eating and drinking prior to surgery: follow the instructions from your surgeon    Take a shower or bath the night before surgery. Use the soap your surgeon gave you to gently clean your skin. If you do not have soap from your surgeon, use your regular soap. Do not shave or scrub the surgery site.  Wear clean pajamas and have clean sheets on your bed.

## 2018-05-02 NOTE — NURSING NOTE
"Chief Complaint   Patient presents with     Pre-Op Exam       Initial /84 (BP Location: Right arm, Patient Position: Sitting, Cuff Size: Adult Large)  Pulse (!) 46  Temp 98.1  F (36.7  C) (Tympanic)  Resp 16  Ht 5' 8\" (1.727 m)  Wt 200 lb (90.7 kg)  BMI 30.41 kg/m2 Estimated body mass index is 30.41 kg/(m^2) as calculated from the following:    Height as of this encounter: 5' 8\" (1.727 m).    Weight as of this encounter: 200 lb (90.7 kg).      Health Maintenance that is potentially due pending provider review:  NONE    n/a    Is there anyone who you would like to be able to receive your results? No  If yes have patient fill out GIL    "

## 2018-05-02 NOTE — MR AVS SNAPSHOT
After Visit Summary   5/2/2018    Emanuel Landeros    MRN: [id number]           Patient Information     Date Of Birth          1965        Visit Information        Provider Department      5/2/2018 3:40 PM Charlene Ramos, CNP [facility]        Today's Diagnoses     Pre-op exam    -  1    Lipoma of abdominal wall        Sinus bradycardia        TERESA (obstructive sleep apnea)          Care Instructions    1. Pre-op exam  - CBC with platelets  - EKG 12-lead complete w/read - Clinics (sinus bradycardia 47 bpm)  Results for orders placed or performed in visit on 05/02/18   CBC with platelets   Result Value Ref Range    WBC 7.5 4.0 - 11.0 10e9/L    RBC Count 4.49 4.4 - 5.9 10e12/L    Hemoglobin 14.1 13.3 - 17.7 g/dL    Hematocrit 40.9 40.0 - 53.0 %    MCV 91 78 - 100 fl    MCH 31.4 26.5 - 33.0 pg    MCHC 34.5 31.5 - 36.5 g/dL    RDW 12.6 10.0 - 15.0 %    Platelet Count 251 150 - 450 10e9/L       2. Lipoma of abdominal wall  Acute  - CBC with platelets  - EKG 12-lead complete w/read - Clinics    3. Sinus bradycardia  Chronic, symptoms of fatigue/ mild exercise intolerance  - Exercise Stress Echocardiogram; Future in [place] ([facility] or Cariology department will call you tomorrow to schedule)  - Once stress test is done, and if normal, I'll okay your pre-op.   - If abnormal stress test, then referral to Cardiology in [place]    4. TERESA (obstructive sleep apnea)  Chronic, stable  - Continue CPAP    Before Your Surgery      Call your surgeon if there is any change in your health. This includes signs of a cold or flu (such as a sore throat, runny nose, cough, rash or fever).    Do not smoke, drink alcohol or take over the counter medicine (unless your surgeon or primary care doctor tells you to) for the 24 hours before and after surgery.    If you take prescribed drugs: Follow your doctor s orders about which medicines to take and which to stop until after surgery.    Eating and drinking prior  to surgery: follow the instructions from your surgeon    Take a shower or bath the night before surgery. Use the soap your surgeon gave you to gently clean your skin. If you do not have soap from your surgeon, use your regular soap. Do not shave or scrub the surgery site.  Wear clean pajamas and have clean sheets on your bed.           Follow-ups after your visit        Your next 10 appointments already scheduled     May 09, 2018   Procedure with Froylan Ayala MD   [facility] (--)    [address] [address]   [phone]           The medical center is located at [address] (between [address] in [place], [place] miles north of [place]).            May 22, 2018 12:45 PM CDT   Return Visit with Froylan [facility]ala MD   [facility]    [address] [address] [phone]              Future tests that were ordered for you today     Open Future Orders        Priority Expected Expires Ordered    Exercise Stress Echocardiogram Routine  5/2/2019 5/2/2018            Who to contact     If you have questions or need follow up information about today's clinic visit or your schedule please contact [facility] directly at [phone].  Normal or non-critical lab and imaging results will be communicated to you by [employer], letter or phone within 4 business days after the clinic has received the results. If you do not hear from us within 7 days, please contact the clinic through [employer] or phone. If you have a critical or abnormal lab result, we will notify you by phone as soon as possible.  Submit refill requests through [employer] or call your pharmacy and they will forward the refill request to us. Please allow 3 business days for your refill to be completed.          Additional Information About Your Visit        [employer] Information     [employer] lets you send messages to your doctor,  "view your test results, renew your prescriptions, schedule appointments and more. To sign up, go to [email or web address] . Click on \"Log in\" on the left side of the screen, which will take you to the Welcome page. Then click on \"Sign up Now\" on the right side of the page.     You will be asked to enter the access code listed below, as well as some personal information. Please follow the directions to create your username and password.     Your access code is: [email or web address]  Expires: 2018  3:29 PM     Your access code will  in 90 days. If you need help or a new code, please call your [facility] clinic or [phone].        Care EveryWhere ID     This is your Care EveryWhere ID. This could be used by other organizations to access your [facility] medical records  [id number]        Your Vitals Were     Pulse Temperature Respirations Height BMI (Body Mass Index)       46 98.1  F (36.7  C) (Tympanic) 16 5' 8\" (1.727 m) 30.41 kg/m2        Blood Pressure from Last 3 Encounters:   18 132/84   18 134/82   18 126/74    Weight from Last 3 Encounters:   18 200 lb (90.7 kg)   18 200 lb (90.7 kg)   18 200 lb (90.7 kg)              We Performed the Following     CBC with platelets     EKG 12-lead complete w/read - Clinics        Primary Care Provider Office Phone # Fax #    Hema Ur MD Meg [phone]       [address] [place] [address]        Equal Access to Services     [facility]: Hadii matt Cardenas, ori kapadia, qaella stephenson. So [facility] [phone].    ATENCIÓN: Si habla español, tiene a green disposición servicios gratuitos de asistencia lingüística. Adolfo al [phone].    We comply with applicable federal civil rights laws and [place] laws. We do not discriminate on the basis of race, color, national origin, age, disability, sex, sexual orientation, or gender identity.          "   Thank you!     Thank you for choosing [facility]  for your care. Our goal is always to provide you with excellent care. Hearing back from our patients is one way we can continue to improve our services. Please take a few minutes to complete the written survey that you may receive in the mail after your visit with us. Thank you!             Your Updated Medication List - Protect others around you: Learn how to safely use, store and throw away your medicines at www.disposemymeds.org.          This list is accurate as of 5/2/18  4:48 PM.  Always use your most recent med list.                   Brand Name Dispense Instructions for use Diagnosis    fluticasone 50 MCG/ACT spray    FLONASE    1 Bottle    USE ONE SPRAY(S) IN EACH NOSTRIL ONCE DAILY *  NEEDS  OFFICE  VISIT  PRIOR  TO  FURTHER  REFILL  *    Seasonal allergic rhinitis       order for DME     1 each    CPAP: 8 cm H2O  Lifetime need and heated humidity.    TERESA (obstructive sleep apnea)       ZYRTEC PO      OTC AS NEEDED

## 2018-05-07 ENCOUNTER — ANESTHESIA EVENT (OUTPATIENT)
Dept: SURGERY | Facility: CLINIC | Age: 53
End: 2018-05-07
Payer: COMMERCIAL

## 2018-05-07 ENCOUNTER — HOSPITAL ENCOUNTER (OUTPATIENT)
Dept: CARDIOLOGY | Facility: CLINIC | Age: 53
Discharge: HOME | End: 2018-05-07
Attending: NURSE PRACTITIONER | Admitting: NURSE PRACTITIONER
Payer: COMMERCIAL

## 2018-05-07 DIAGNOSIS — R00.1 SINUS BRADYCARDIA: ICD-10-CM

## 2018-05-07 PROCEDURE — 93325 DOPPLER ECHO COLOR FLOW MAPG: CPT | Mod: 26 | Performed by: INTERNAL MEDICINE

## 2018-05-07 PROCEDURE — 93018 CV STRESS TEST I&R ONLY: CPT | Performed by: INTERNAL MEDICINE

## 2018-05-07 PROCEDURE — 25500064 ZZH RX 255 OP 636: Performed by: NURSE PRACTITIONER

## 2018-05-07 PROCEDURE — 93016 CV STRESS TEST SUPVJ ONLY: CPT | Performed by: INTERNAL MEDICINE

## 2018-05-07 PROCEDURE — 93321 DOPPLER ECHO F-UP/LMTD STD: CPT | Mod: TC

## 2018-05-07 PROCEDURE — 93017 CV STRESS TEST TRACING ONLY: CPT | Performed by: REHABILITATION PRACTITIONER

## 2018-05-07 PROCEDURE — 93350 STRESS TTE ONLY: CPT | Mod: 26 | Performed by: INTERNAL MEDICINE

## 2018-05-07 PROCEDURE — 93321 DOPPLER ECHO F-UP/LMTD STD: CPT | Mod: 26 | Performed by: INTERNAL MEDICINE

## 2018-05-07 RX ADMIN — HUMAN ALBUMIN MICROSPHERES AND PERFLUTREN 3 ML: 10; .22 INJECTION, SOLUTION INTRAVENOUS at 13:42

## 2018-05-08 ENCOUNTER — DOCUMENTATION ONLY (OUTPATIENT)
Dept: FAMILY MEDICINE | Facility: CLINIC | Age: 53
End: 2018-05-08

## 2018-05-08 ENCOUNTER — TELEPHONE (OUTPATIENT)
Dept: FAMILY MEDICINE | Facility: CLINIC | Age: 53
End: 2018-05-08

## 2018-05-08 NOTE — TELEPHONE ENCOUNTER
Pt's wife is wanting to know if Emanuel is cleared for his surgery tomorrow. He had his stress test done yesterday. He had seen Katie for the pre-op and was told she wasn't in the office today.   Magalys (wife) cell: [phone]

## 2018-05-08 NOTE — TELEPHONE ENCOUNTER
Per 05-02-18 pre- op, Charlene-    Proposed Surgery/ Procedure: Lipoma of abdominal wall   Date of Surgery/ Procedure: 05/09/2018   Time of Surgery/ Procedure: 10:00 AM  Hospital/Surgical Facility: [facility]  Primary Physician: Heam Weaver  Type of Anesthesia Anticipated: General      Forwarded stress echo result to Dr. Weaver for review, pre- op clearance- surg approved per Dr. Weaver.  Pt informed, surg contacted with result.   AVERY Williamson RN

## 2018-05-08 NOTE — PROGRESS NOTES
Echocardiogram results reviewed, finding consistent with bicuspid aortic valve with mild valvular aortic stenosis and mild aortic ascending aortic dilatation.  No ischemic changes with exercise.      Approval given to proceed with proposed surgical procedure (abdominal wall lipoma removal) without any further evaluation.      Results for orders placed or performed during the hospital encounter of 18   ECHO STRESS WITH OPTISON    Narrative    [id number]  ECH77  [id number]  [id number]^ADE^ALINE^NATHAN                                                                          Version 2        [facility]  Echocardiography Laboratory  [address] [place] [address]        Name: ANGÉLICA LUKE  MRN: [id number]  : 1965  Study Date: 2018 12:58 PM  Age: 52 yrs  Gender: Male  Patient Location: [facility]  Reason For Study: , Sinus bradycardia  Ordering Physician: ALINE BOOKER  Referring Physician: ALINE BOOKER  Performed By: Pantera Sandoval     BSA: 2.0 m2  Height: 68 in  Weight: 200 lb  HR: 50  BP: 130/64 mmHg  _____________________________________________________________________________  __        Procedure  Stress Echo Complete. Contrast Optison.  _____________________________________________________________________________  __        Interpretation Summary  Sinus bradycardia with normal resting blood pressure. With exercise there is a  normal heart rate and a hypertensive blood pressure response.  Average exercise capacity for age  Sinus bradycardia with no significant ECG abnormalities at rest, no ischemic  changes with exercise.  Normal resting LV function with good augmentation of all wall segments post  exercise.  This is a normal stress echo indicating low probability of significant  exercise-induced myocardial ischemia.     Functionally bicuspid aortic valve with fusion of the right and noncoronary  cusps.  Mild valvular aortic stenosis.  Mean gradient 14 mmHg,  calculated aortic valve area 1.8 cmÂ  which is  consistent with its appearance.  The ascending aorta is Mildly dilated.  _____________________________________________________________________________  __     Stress  Exercise was stopped due to fatigue.  There was a borderline hypertensive BP response to exercise.  The patient exhibited no chest pain during exercise.  Target Heart Rate was achieved.  The EKG portion of this stress test was negative for inducible ischemia (see  echo results below).  The visual ejection fraction is estimated at >70%.  Normal left ventricular function and wall motion at rest and post-stress.  Left ventricular cavity size decreases with exercise.  Global LV systolic function augments with exercise.     Baseline  Normal baseline electrocardiogram.  With sinus bradycardia.  Normal left ventricular function and wall motion at rest.  The visual ejection fraction is estimated at 60-65%.     Stress Results         Protocol:  [place] HEART MANUEL        Maximum Predicted HR:   170 bpm         Target HR: 145 bpm               % Maximum Predicted HR: 92 %                Stage  DurationHeart Rate  BP            Comment                    (mm:ss)   (bpm)            Stage 1   3:00      85    150/80            Stage 2   3:00      98    170/68            Stage 3   3:00     116    200/68            Stage 4   2:17     157      /   [id number] FAC:ABOVE/DUKE 10           RECOVERYR  5:29      87    132/68                Stress Duration:   11:17 mm:ss        Recovery Time: 5:29 mm:ss          Maximum Stress HR: 157 bpm            METS:          13     Aortic Valve  Functionally bicuspid aortic valve with fusion of the right and noncoronary  cusps. Mild valvular aortic stenosis. Mean gradient 14 mmHg, calculated aortic  valve area 1.8 cmÂ  which is consistent with its appearance.        Vessels  The aortic root is normal size. The ascending aorta is Mildly  dilated.  _____________________________________________________________________________  __  MMode/2D Measurements & Calculations     Ao root diam: 3.5 cm  asc Aorta Diam: 4.0 cm  LVOT diam: 2.5 cm  LVOT area: 4.9 cm2        Doppler Measurements & Calculations  Ao V2 max: 271.5 cm/sec  Ao max P.0 mmHg  Ao V2 mean: 173.0 cm/sec  Ao mean P.0 mmHg  Ao V2 VTI: 55.8 cm  RENETTA(I,D): 2.1 cm2  RENETTA(V,D): 1.8 cm2  LV V1 max PG: 3.9 mmHg  LV V1 max: 99.3 cm/sec  LV V1 VTI: 23.8 cm  SV(LVOT): 116.8 ml  SI(LVOT): 57.2 ml/m2  AV Alen Ratio (DI): 0.37  RENETTA Index (cm2/m2): 1.0              _____________________________________________________________________________  __        Report approved by: Earnestine Raman 2018 02:51 PM            Hema Weaver MD  [facility]

## 2018-05-09 ENCOUNTER — TELEPHONE (OUTPATIENT)
Dept: SURGERY | Facility: CLINIC | Age: 53
End: 2018-05-09

## 2018-05-09 ENCOUNTER — ANESTHESIA (OUTPATIENT)
Dept: SURGERY | Facility: CLINIC | Age: 53
End: 2018-05-09
Payer: COMMERCIAL

## 2018-05-09 ENCOUNTER — SURGERY (OUTPATIENT)
Age: 53
End: 2018-05-09

## 2018-05-09 ENCOUNTER — HOSPITAL ENCOUNTER (OUTPATIENT)
Facility: CLINIC | Age: 53
Discharge: HOME | End: 2018-05-09
Attending: SURGERY | Admitting: SURGERY
Payer: COMMERCIAL

## 2018-05-09 VITALS
BODY MASS INDEX: 30.31 KG/M2 | SYSTOLIC BLOOD PRESSURE: 108 MMHG | DIASTOLIC BLOOD PRESSURE: 71 MMHG | RESPIRATION RATE: 16 BRPM | OXYGEN SATURATION: 95 % | WEIGHT: 200 LBS | HEART RATE: 42 BPM | HEIGHT: 68 IN | TEMPERATURE: 98 F

## 2018-05-09 DIAGNOSIS — D17.1 LIPOMA OF ABDOMINAL WALL: Primary | ICD-10-CM

## 2018-05-09 PROCEDURE — 71000027 ZZH RECOVERY PHASE 2 EACH 15 MINS: Performed by: SURGERY

## 2018-05-09 PROCEDURE — 25000128 H RX IP 250 OP 636: Performed by: NURSE ANESTHETIST, CERTIFIED REGISTERED

## 2018-05-09 PROCEDURE — 88307 TISSUE EXAM BY PATHOLOGIST: CPT | Performed by: SURGERY

## 2018-05-09 PROCEDURE — 37000008 ZZH ANESTHESIA TECHNICAL FEE, 1ST 30 MIN: Performed by: SURGERY

## 2018-05-09 PROCEDURE — 22903 EXC ABD LES SC 3 CM/>: CPT | Performed by: SURGERY

## 2018-05-09 PROCEDURE — 40000305 ZZH STATISTIC PRE PROC ASSESS I: Performed by: SURGERY

## 2018-05-09 PROCEDURE — 36000052 ZZH SURGERY LEVEL 2 EA 15 ADDTL MIN: Performed by: SURGERY

## 2018-05-09 PROCEDURE — 25000125 ZZHC RX 250: Performed by: NURSE ANESTHETIST, CERTIFIED REGISTERED

## 2018-05-09 PROCEDURE — 88307 TISSUE EXAM BY PATHOLOGIST: CPT | Mod: 26 | Performed by: SURGERY

## 2018-05-09 PROCEDURE — 36000050 ZZH SURGERY LEVEL 2 1ST 30 MIN: Performed by: SURGERY

## 2018-05-09 PROCEDURE — 37000009 ZZH ANESTHESIA TECHNICAL FEE, EACH ADDTL 15 MIN: Performed by: SURGERY

## 2018-05-09 PROCEDURE — 25000125 ZZHC RX 250: Performed by: SURGERY

## 2018-05-09 PROCEDURE — 27210794 ZZH OR GENERAL SUPPLY STERILE: Performed by: SURGERY

## 2018-05-09 RX ORDER — KETOROLAC TROMETHAMINE 30 MG/ML
30 INJECTION, SOLUTION INTRAMUSCULAR; INTRAVENOUS EVERY 6 HOURS PRN
Status: CANCELLED | OUTPATIENT
Start: 2018-05-09 | End: 2018-05-14

## 2018-05-09 RX ORDER — FENTANYL CITRATE 50 UG/ML
25-50 INJECTION, SOLUTION INTRAMUSCULAR; INTRAVENOUS
Status: CANCELLED | OUTPATIENT
Start: 2018-05-09

## 2018-05-09 RX ORDER — FENTANYL CITRATE 50 UG/ML
INJECTION, SOLUTION INTRAMUSCULAR; INTRAVENOUS PRN
Status: DISCONTINUED | OUTPATIENT
Start: 2018-05-09 | End: 2018-05-09

## 2018-05-09 RX ORDER — CEFAZOLIN SODIUM 1 G/3ML
1 INJECTION, POWDER, FOR SOLUTION INTRAMUSCULAR; INTRAVENOUS SEE ADMIN INSTRUCTIONS
Status: DISCONTINUED | OUTPATIENT
Start: 2018-05-09 | End: 2018-05-09 | Stop reason: HOSPADM

## 2018-05-09 RX ORDER — DEXAMETHASONE SODIUM PHOSPHATE 4 MG/ML
INJECTION, SOLUTION INTRA-ARTICULAR; INTRALESIONAL; INTRAMUSCULAR; INTRAVENOUS; SOFT TISSUE PRN
Status: DISCONTINUED | OUTPATIENT
Start: 2018-05-09 | End: 2018-05-09

## 2018-05-09 RX ORDER — MEPERIDINE HYDROCHLORIDE 50 MG/ML
12.5 INJECTION INTRAMUSCULAR; INTRAVENOUS; SUBCUTANEOUS
Status: CANCELLED | OUTPATIENT
Start: 2018-05-09

## 2018-05-09 RX ORDER — LIDOCAINE 40 MG/G
CREAM TOPICAL
Status: DISCONTINUED | OUTPATIENT
Start: 2018-05-09 | End: 2018-05-09 | Stop reason: HOSPADM

## 2018-05-09 RX ORDER — PROPOFOL 10 MG/ML
INJECTION, EMULSION INTRAVENOUS CONTINUOUS PRN
Status: DISCONTINUED | OUTPATIENT
Start: 2018-05-09 | End: 2018-05-09

## 2018-05-09 RX ORDER — NALOXONE HYDROCHLORIDE 0.4 MG/ML
.1-.4 INJECTION, SOLUTION INTRAMUSCULAR; INTRAVENOUS; SUBCUTANEOUS
Status: CANCELLED | OUTPATIENT
Start: 2018-05-09 | End: 2018-05-10

## 2018-05-09 RX ORDER — ONDANSETRON 2 MG/ML
4 INJECTION INTRAMUSCULAR; INTRAVENOUS EVERY 30 MIN PRN
Status: CANCELLED | OUTPATIENT
Start: 2018-05-09

## 2018-05-09 RX ORDER — SODIUM CHLORIDE, SODIUM LACTATE, POTASSIUM CHLORIDE, CALCIUM CHLORIDE 600; 310; 30; 20 MG/100ML; MG/100ML; MG/100ML; MG/100ML
INJECTION, SOLUTION INTRAVENOUS CONTINUOUS
Status: CANCELLED | OUTPATIENT
Start: 2018-05-09

## 2018-05-09 RX ORDER — BUPIVACAINE HYDROCHLORIDE AND EPINEPHRINE 5; 5 MG/ML; UG/ML
INJECTION, SOLUTION PERINEURAL PRN
Status: DISCONTINUED | OUTPATIENT
Start: 2018-05-09 | End: 2018-05-09 | Stop reason: HOSPADM

## 2018-05-09 RX ORDER — OXYCODONE AND ACETAMINOPHEN 5; 325 MG/1; MG/1
1 TABLET ORAL
Status: CANCELLED | OUTPATIENT
Start: 2018-05-09

## 2018-05-09 RX ORDER — HYDROMORPHONE HYDROCHLORIDE 1 MG/ML
.3-.5 INJECTION, SOLUTION INTRAMUSCULAR; INTRAVENOUS; SUBCUTANEOUS EVERY 10 MIN PRN
Status: CANCELLED | OUTPATIENT
Start: 2018-05-09

## 2018-05-09 RX ORDER — ONDANSETRON 2 MG/ML
INJECTION INTRAMUSCULAR; INTRAVENOUS PRN
Status: DISCONTINUED | OUTPATIENT
Start: 2018-05-09 | End: 2018-05-09

## 2018-05-09 RX ORDER — CEFAZOLIN SODIUM 2 G/100ML
2 INJECTION, SOLUTION INTRAVENOUS
Status: DISCONTINUED | OUTPATIENT
Start: 2018-05-09 | End: 2018-05-09 | Stop reason: HOSPADM

## 2018-05-09 RX ORDER — OXYCODONE AND ACETAMINOPHEN 5; 325 MG/1; MG/1
1-2 TABLET ORAL EVERY 4 HOURS PRN
Qty: 20 TABLET | Refills: 0 | Status: SHIPPED | OUTPATIENT
Start: 2018-05-09 | End: 2018-05-22

## 2018-05-09 RX ORDER — SODIUM CHLORIDE, SODIUM LACTATE, POTASSIUM CHLORIDE, CALCIUM CHLORIDE 600; 310; 30; 20 MG/100ML; MG/100ML; MG/100ML; MG/100ML
INJECTION, SOLUTION INTRAVENOUS CONTINUOUS
Status: DISCONTINUED | OUTPATIENT
Start: 2018-05-09 | End: 2018-05-09 | Stop reason: HOSPADM

## 2018-05-09 RX ORDER — ONDANSETRON 4 MG/1
4 TABLET, ORALLY DISINTEGRATING ORAL EVERY 30 MIN PRN
Status: CANCELLED | OUTPATIENT
Start: 2018-05-09

## 2018-05-09 RX ADMIN — ONDANSETRON 4 MG: 2 INJECTION INTRAMUSCULAR; INTRAVENOUS at 12:34

## 2018-05-09 RX ADMIN — MIDAZOLAM 1 MG: 1 INJECTION INTRAMUSCULAR; INTRAVENOUS at 12:34

## 2018-05-09 RX ADMIN — DEXAMETHASONE SODIUM PHOSPHATE 4 MG: 4 INJECTION, SOLUTION INTRA-ARTICULAR; INTRALESIONAL; INTRAMUSCULAR; INTRAVENOUS; SOFT TISSUE at 12:34

## 2018-05-09 RX ADMIN — MIDAZOLAM 2 MG: 1 INJECTION INTRAMUSCULAR; INTRAVENOUS at 12:27

## 2018-05-09 RX ADMIN — FENTANYL CITRATE 100 MCG: 50 INJECTION, SOLUTION INTRAMUSCULAR; INTRAVENOUS at 12:28

## 2018-05-09 RX ADMIN — MIDAZOLAM 1 MG: 1 INJECTION INTRAMUSCULAR; INTRAVENOUS at 12:30

## 2018-05-09 RX ADMIN — PROPOFOL 100 MCG/KG/MIN: 10 INJECTION, EMULSION INTRAVENOUS at 12:34

## 2018-05-09 RX ADMIN — BUPIVACAINE HYDROCHLORIDE AND EPINEPHRINE BITARTRATE 20 ML: 5; .005 INJECTION, SOLUTION PERINEURAL at 12:56

## 2018-05-09 RX ADMIN — LIDOCAINE HYDROCHLORIDE 50 ML: 10 INJECTION, SOLUTION INFILTRATION; PERINEURAL at 11:32

## 2018-05-09 RX ADMIN — SODIUM CHLORIDE, POTASSIUM CHLORIDE, SODIUM LACTATE AND CALCIUM CHLORIDE: 600; 310; 30; 20 INJECTION, SOLUTION INTRAVENOUS at 11:32

## 2018-05-09 NOTE — IP AVS SNAPSHOT
[facility] PreOP/Phase II    [address] [facility] [address]    Phone:  [phone]    Fax:  [phone]                                       After Visit Summary   5/9/2018    Emanuel Landeros    MRN: [id number]           After Visit Summary Signature Page     I have received my discharge instructions, and my questions have been answered. I have discussed any challenges I see with this plan with the nurse or doctor.    ..........................................................................................................................................  Patient/Patient Representative Signature      ..........................................................................................................................................  Patient Representative Print Name and Relationship to Patient    ..................................................               ................................................  Date                                            Time    ..........................................................................................................................................  Reviewed by Signature/Title    ...................................................              ..............................................  Date                                                            Time

## 2018-05-09 NOTE — OP NOTE
Date of Service: 5/9/2018     STAFF SURGEON:  Froylan Ayala MD     ASSISTANT:  None.     PREOPERATIVE DIAGNOSIS:  LUQ abdominal wall lipoma     POSTOPERATIVE DIAGNOSIS:  Same     NAME OF PROCEDURE(S):  Excision of abdominal wall lipoma     INDICATIONS FOR PROCEDURE:  The patient is a 53yo male with lump in LUQ, some discomfort when bending over. Found to have likely subcutaneous mass in area and offered excision. Risks, benefits and alternatives discussed and consent obtained      EBL: 2 cc    ANESTHESIA:  MAC+local    COMPLICATIONS: None     DRAINS:  None.     SPECIMENS:  Abdominal mass     IMPLANTS:none     OPERATIVE FINDINGS:  Fatty tissue in LUQ over lower ribs/costal margin likely causing the bulge. No encapsulated mass/lipoma     PROCEDURE DETAIL:  Following consent, the patient was brought from the preoperative holding area to the operating suite and laid in supine position. He had MAC sedation without difficulty. Abdomen prepped and draped in usual fashion. Time out performed. He received preoperative antibiotics and had SCDs in place. I then made a 4 cm incision over this area in the LUQ and dissected into the subcutaneous tissues with cautery. I felt around in the area with my finger and just felt likely normal fatty tissue- no obvious or encapsulated lipoma or other mass. I dissected down to the muscle to ensure nothing deeper then excised the tissue in the area. This was about 5.5 x 3 cm in total and was sent off for pathology. Hemostasis obtained with cautery. Wound then closed in multiple layers using interrupted 3-0 vicryl for the deep layer and running 4-0 monocryl for skin. Incision covered with dermabond. Patient tolerated well and was discharged from the OR to recovery           Froylan Ayala MD

## 2018-05-09 NOTE — TELEPHONE ENCOUNTER
Reason for Call:  Form, our goal is to have forms completed with 72 hours, however, some forms may require a visit or additional information.    Type of letter, form or note:  FMLA    Who is the form from?: Insurance comp    Where did the form come from: form was faxed in    What clinic location was the form placed at?: [facility] (ENT, Neurology, Rheumatology, Surgery, Urology, Vascular Surgery)    Where the form was placed: Given to MA/RN    What number is listed as a contact on the form?: [phone]        Additional comments: please complete the form and fax back to [phone]    Call taken on 5/9/2018 at 2:21 PM by Eleanor Vega

## 2018-05-09 NOTE — H&P (VIEW-ONLY)
Echocardiogram results reviewed, finding consistent with bicuspid aortic valve with mild valvular aortic stenosis and mild aortic ascending aortic dilatation.  No ischemic changes with exercise.      Approval given to proceed with proposed surgical procedure (abdominal wall lipoma removal) without any further evaluation.      Results for orders placed or performed during the hospital encounter of 18   ECHO STRESS WITH OPTISON    Narrative    [id number]  ECH77  [id number]  [id number]^ADE^ALINE^NATHAN                                                                          Version 2        [facility]  Echocardiography Laboratory  [address] [place] [address]        Name: ANGÉLICA LUKE  MRN: [id number]  : 1965  Study Date: 2018 12:58 PM  Age: 52 yrs  Gender: Male  Patient Location: [facility]  Reason For Study: , Sinus bradycardia  Ordering Physician: ALINE BOOKER  Referring Physician: ALINE BOOKER  Performed By: Pantera Sandoval     BSA: 2.0 m2  Height: 68 in  Weight: 200 lb  HR: 50  BP: 130/64 mmHg  _____________________________________________________________________________  __        Procedure  Stress Echo Complete. Contrast Optison.  _____________________________________________________________________________  __        Interpretation Summary  Sinus bradycardia with normal resting blood pressure. With exercise there is a  normal heart rate and a hypertensive blood pressure response.  Average exercise capacity for age  Sinus bradycardia with no significant ECG abnormalities at rest, no ischemic  changes with exercise.  Normal resting LV function with good augmentation of all wall segments post  exercise.  This is a normal stress echo indicating low probability of significant  exercise-induced myocardial ischemia.     Functionally bicuspid aortic valve with fusion of the right and noncoronary  cusps.  Mild valvular aortic stenosis.  Mean gradient 14 mmHg,  calculated aortic valve area 1.8 cmÂ  which is  consistent with its appearance.  The ascending aorta is Mildly dilated.  _____________________________________________________________________________  __     Stress  Exercise was stopped due to fatigue.  There was a borderline hypertensive BP response to exercise.  The patient exhibited no chest pain during exercise.  Target Heart Rate was achieved.  The EKG portion of this stress test was negative for inducible ischemia (see  echo results below).  The visual ejection fraction is estimated at >70%.  Normal left ventricular function and wall motion at rest and post-stress.  Left ventricular cavity size decreases with exercise.  Global LV systolic function augments with exercise.     Baseline  Normal baseline electrocardiogram.  With sinus bradycardia.  Normal left ventricular function and wall motion at rest.  The visual ejection fraction is estimated at 60-65%.     Stress Results         Protocol:  [place] HEART MANUEL        Maximum Predicted HR:   170 bpm         Target HR: 145 bpm               % Maximum Predicted HR: 92 %                Stage  DurationHeart Rate  BP            Comment                    (mm:ss)   (bpm)            Stage 1   3:00      85    150/80            Stage 2   3:00      98    170/68            Stage 3   3:00     116    200/68            Stage 4   2:17     157      /   [id number] FAC:ABOVE/DUKE 10           RECOVERYR  5:29      87    132/68                Stress Duration:   11:17 mm:ss        Recovery Time: 5:29 mm:ss          Maximum Stress HR: 157 bpm            METS:          13     Aortic Valve  Functionally bicuspid aortic valve with fusion of the right and noncoronary  cusps. Mild valvular aortic stenosis. Mean gradient 14 mmHg, calculated aortic  valve area 1.8 cmÂ  which is consistent with its appearance.        Vessels  The aortic root is normal size. The ascending aorta is Mildly  dilated.  _____________________________________________________________________________  __  MMode/2D Measurements & Calculations     Ao root diam: 3.5 cm  asc Aorta Diam: 4.0 cm  LVOT diam: 2.5 cm  LVOT area: 4.9 cm2        Doppler Measurements & Calculations  Ao V2 max: 271.5 cm/sec  Ao max P.0 mmHg  Ao V2 mean: 173.0 cm/sec  Ao mean P.0 mmHg  Ao V2 VTI: 55.8 cm  RENETTA(I,D): 2.1 cm2  RENETTA(V,D): 1.8 cm2  LV V1 max PG: 3.9 mmHg  LV V1 max: 99.3 cm/sec  LV V1 VTI: 23.8 cm  SV(LVOT): 116.8 ml  SI(LVOT): 57.2 ml/m2  AV Alen Ratio (DI): 0.37  RENETTA Index (cm2/m2): 1.0              _____________________________________________________________________________  __        Report approved by: Earnestine Raman 2018 02:51 PM            Hema Weaver MD  [facility]

## 2018-05-09 NOTE — ANESTHESIA CARE TRANSFER NOTE
Patient: Emanuel Landeros    Procedure(s):  Excision soft tissue mass left abdomen - Wound Class: I-Clean    Diagnosis: Lipoma of abdominal wall  Diagnosis Additional Information: No value filed.    Anesthesia Type:   MAC     Note:  Airway :Room Air  Patient transferred to:Phase II  Handoff Report: Identifed the Patient, Identified the Reponsible Provider, Reviewed the pertinent medical history, Discussed the surgical course, Reviewed Intra-OP anesthesia mangement and issues during anesthesia, Set expectations for post-procedure period and Allowed opportunity for questions and acknowledgement of understanding      Vitals: (Last set prior to Anesthesia Care Transfer)    CRNA VITALS  5/9/2018 1229 - 5/9/2018 1319      5/9/2018             Pulse: 61    SpO2: 96 %                Electronically Signed By: Burt Chatterjee CRNA, APRN CRNA  May 9, 2018  1:19 PM

## 2018-05-09 NOTE — DISCHARGE INSTRUCTIONS
Same Day Surgery Discharge Instructions  Special Precautions After Surgery - Adult    1. It is not unusual to feel lightheaded or faint, up to 24 hours after surgery or while taking pain medication.  If you have these symptoms; sit for a few minutes before standing and have someone assist you when getting up.  2. You should rest and relax for the next 24 hours and must have someone stay with you for at least 24 hours after your discharge.  3. DO NOT DRIVE any vehicle or operate mechanical equipment for 24 hours following the end of your surgery.  DO NOT DRIVE while taking narcotic pain medications that have been prescribed by your physician.  If you had a limb operated on, you must be able to use it fully to drive.  4. DO NOT drink alcoholic beverages for 24 hours following surgery or while taking prescription pain medication.  5. Drink clear liquids (apple juice, ginger ale, broth, 7-Up, etc.).  Progress to your regular diet as you feel able.  6. Any questions call your physician and do not make important decisions for 24 hours.    ACTIVITY  ? Up as tolerated.        Keep appointment to return to the clinic.    Medication:  Start a pain pill as soon as you start to feel any pain.     Additional discharge instructions: Cold pack off & on incision to decrease swelling & pain.  __________________________________________________________________________________________________________________________________  IMPORTANT NUMBERS:    [facility] Main Number:  [phone], [phone]  Pharmacy:  [phone]  Same Day Surgery:  [phone], Monday - Friday until 8:30 p.m.  Urgent Care:  [phone]  Emergency Room:  [phone]      [facility]:  [phone]                                                                             [facility]:  [phone] option 1  [facility]:  [phone]     [facility]:  [phone]   [facility]:   [phone]   Home Medical Equipment: [phone]  [facility]:  [phone]

## 2018-05-09 NOTE — IP AVS SNAPSHOT
MRN:[id number]                      After Visit Summary   5/9/2018    Emanuel Landeros    MRN: [id number]           Thank you!     Thank you for choosing [facility] for your care. Our goal is always to provide you with excellent care. Hearing back from our patients is one way we can continue to improve our services. Please take a few minutes to complete the written survey that you may receive in the mail after you visit with us. Thank you!        Patient Information     Date Of Birth          1965        About your hospital stay     You were admitted on:  May 9, 2018 You last received care in the:  [facility] PreOP/Phase II    You were discharged on:  May 9, 2018       Who to Call     For medical emergencies, please call 911.  For non-urgent questions about your medical care, please call your primary care provider or clinic, [phone]  For questions related to your surgery, please call your surgery clinic        Attending Provider     Provider Ziggy Friedman MD Surgery       Primary Care Provider Office Phone # Fax #    Hema Ur MD Meg [phone]      After Care Instructions     Diet Instructions       Resume pre-procedure diet            Discharge Instructions       Discharge Instructions    DISCHARGE INSTRUCTIONS  DR. ZIGGY WALDEN  1. You may resume your regular diet when you feel you are ready to. DO NOT drink alcoholic beverages for  24 hours of while you are taking prescription medication.  2. Limit your activities for the first 48 hours. Gradually, increase them as tolerated. You may use stairs.  I encourage you to walk as tolerated.  3. You will have some discomfort at the incision sites. This is expected. This should improve over the next  2-3 days. Ice and pain medication will help with this pain. Use prescribed pain medication as instructed.  4. Bruising and mild swelling is normal after surgery. The area below and around the incision(s) will  be hard and  elevated. This is part of the normal healing process. This will resolve slowly over the next several months.  If you feel the pain is increasing and cannot explain it by increasing activity please call us at [phone]  5. Your wounds may be covered with glue. The glue is water tight and so you can shower the day following surgery. Wait at least 48 hours to allow the skin beneath to seal before submerging in a bath tub or pool. If glue was not used wait 48 hours before bathing.  6. Avoid Aspirin for the first 72 hours after the procedure. This medication may increase the tendency to bleed.  7. Use the following medications (in addition to your normal meds) as shown:  Name of Medicine Dose Frequency Reason  a. Percocet 5 mg 1-2 every 6 hours as needed for pain. This contains 325 mg of Tylenol (acetaminophen)  per tablet. For example, you may take 1 Percocet and 1 Tylenol, or 2 Percocet and no Tylenol,  or 2 Tylenol and no Percocet every 6 hours.  b. Tylenol (acetaminophen) 500 mg every 6 hours as needed for pain. Do not take more than 1000 mg  every 6 hours. (see above)  c. Motrin (ibuprophen) 200-800 mg every 6 hours as needed for pain. Take with food.  8. Notify [facility] at [phone] if:     Your discomfort is not relieved by your pain medication     You have signs of infection such as temperature above 100.5 degrees orally, chills, or  increasing daily discomfort.     Incision site is becoming more red and/or there is purulent drainage.     You have questions or concerns  9. Please call [phone] to schedule a follow up appointment in 2 weeks(s)  10. When taking narcotics (pain medication more than Tylenol (acetaminophen) and Motrin (ibuprophen) it is  important to keep your stools soft to avoid constipation and pain with straining. This is best done by drinking  fluids (nonalcoholic and non-caffeinated) and taking a stool softener i.e. Metamucil or milk of magnesia.  You  may be able to use non-narcotics for pain relief especially by the 3rd post- operative day. [id number] mg  every 6 hours and/or Motrin (ibuprophen) 200-800 mg every 6 hours. Please do not take more than 4 grams  of Tylenol (acetaminophen) per day. Remember your Percocet does have Tylenol (acetaminophen) already  in it. If you have a history of stomach ulcers or stomach problems than do not take the Motrin (ibuprophen).  Please take Motrin (ibuprophen) with food to help protect the stomach.  11. Do not drive or operate heavy machinery for 24 hours after surgery or when taking narcotics. You may  resume driving when feel that you can safely avoid an accident and are not taking narcotics. This is usually  5 to 7 days after surgery. You should not be alone for 24 hours after surgery.  Discharge Owner: Dr Ayala  Date of Origin: 11/06  Revised/Reviewed:8/15            Discharge Instructions       Patient to follow up with appointment in 2 weeks            Ice to affected area       Ice to operative site PRN            No Alcohol       For 24 hours post procedure            No driving or operating machinery        until the day after procedure            Shower       May shower Postoperative Day (POD)  1                  Your next 10 appointments already scheduled     May 22, 2018 12:45 PM CDT   [facility]   [facility]    [address] [id number] [phone]              Further instructions from your care team                           Same Day Surgery Discharge Instructions  Special Precautions After Surgery - Adult    1. It is not unusual to feel lightheaded or faint, up to 24 hours after surgery or while taking pain medication.  If you have these symptoms; sit for a few minutes before standing and have someone assist you when getting up.  2. You should rest and relax for the next 24 hours and must have someone stay with  you for at least 24 hours after your discharge.  3. DO NOT DRIVE any vehicle or operate mechanical equipment for 24 hours following the end of your surgery.  DO NOT DRIVE while taking narcotic pain medications that have been prescribed by your physician.  If you had a limb operated on, you must be able to use it fully to drive.  4. DO NOT drink alcoholic beverages for 24 hours following surgery or while taking prescription pain medication.  5. Drink clear liquids (apple juice, ginger ale, broth, 7-Up, etc.).  Progress to your regular diet as you feel able.  6. Any questions call your physician and do not make important decisions for 24 hours.    ACTIVITY  ? Up as tolerated.        Keep appointment to return to the clinic.    Medication:  Start a pain pill as soon as you start to feel any pain.     Additional discharge instructions: Cold pack off & on incision to decrease swelling & pain.  __________________________________________________________________________________________________________________________________  IMPORTANT NUMBERS:    [facility] Main Number:  [phone], [phone]  Pharmacy:  [phone]  Same Day Surgery:  [phone], Monday - Friday until 8:30 p.m.  Urgent Care:  [phone]  Emergency Room:  [phone]      [facility]:  [phone]                                                                             [facility]:  [phone] option 1  [facility]:  [phone]     [facility]:  [phone]   [facility]:  [phone]   Home Medical Equipment: [phone]  [facility]:  [phone]        Pending Results     Date and Time Order Name Status Description    5/9/2018 1252 Surgical pathology exam In process             Admission Information     Date & Time Provider Department Dept. Phone    5/9/2018 Froylan Ayala MD [facility] PreOP/Phase [phone]      Your Vitals Were     Blood Pressure Pulse  "Temperature Respirations Height Weight    107/68 42 98  F (36.7  C) (Oral) 16 1.727 m (5' 8\") 90.7 kg (200 lb)    Pulse Oximetry BMI (Body Mass Index)                95% 30.41 kg/m2          [employer] Information     [employer] lets you send messages to your doctor, view your test results, renew your prescriptions, schedule appointments and more. To sign up, go to [email or web address] . Click on \"Log in\" on the left side of the screen, which will take you to the Welcome page. Then click on \"Sign up Now\" on the right side of the page.     You will be asked to enter the access code listed below, as well as some personal information. Please follow the directions to create your username and password.     Your access code is: [email or web address]  Expires: 2018  3:29 PM     Your access code will  in 90 days. If you need help or a new code, please call your [facility] clinic or [phone].        Care EveryWhere ID     This is your Care EveryWhere ID. This could be used by other organizations to access your [facility] medical records  [id number]        Equal Access to Services     MARGARETH KIM AH: Karlos Cardenas, ori kapadia, jessi phillips, ella javed. So [facility] [phone].    ATENCIÓN: Si habla español, tiene a green disposición servicios gratuitos de asistencia lingüística. Adolfo al [phone].    We comply with applicable federal civil rights laws and [place] laws. We do not discriminate on the basis of race, color, national origin, age, disability, sex, sexual orientation, or gender identity.               Review of your medicines      START taking        Dose / Directions    oxyCODONE-acetaminophen 5-325 MG per tablet   Commonly known as:  PERCOCET   Used for:  Lipoma of abdominal wall        Dose:  1-2 tablet   Take 1-2 tablets by mouth every 4 hours as needed for pain (moderate to severe)   Quantity:  20 tablet   Refills:  0         CONTINUE these " medicines which have NOT CHANGED        Dose / Directions    fluticasone 50 MCG/ACT spray   Commonly known as:  FLONASE   Used for:  Seasonal allergic rhinitis        USE ONE SPRAY(S) IN EACH NOSTRIL ONCE DAILY *  NEEDS  OFFICE  VISIT  PRIOR  TO  FURTHER  REFILL  *   Quantity:  1 Bottle   Refills:  3       order for DME   Used for:  TERESA (obstructive sleep apnea)        CPAP: 8 cm H2O  Lifetime need and heated humidity.   Quantity:  1 each   Refills:  0       ZYRTEC PO        OTC AS NEEDED   Refills:  0            Where to get your medicines      Some of these will need a paper prescription and others can be bought over the counter. Ask your nurse if you have questions.     Bring a paper prescription for each of these medications     oxyCODONE-acetaminophen 5-325 MG per tablet                Protect others around you: Learn how to safely use, store and throw away your medicines at www.disposemymeds.org.        Information about OPIOIDS     PRESCRIPTION OPIOIDS: WHAT YOU NEED TO KNOW   You have a prescription for an opioid (narcotic) pain medicine. Opioids can cause addiction. If you have a history of chemical dependency of any type, you are at a higher risk of becoming addicted to opioids. Only take this medicine after all other options have been tried. Take it for as short a time and as few doses as possible.     Do not:    Drive. If you drive while taking these medicines, you could be arrested for driving under the influence (DUI).    Operate heavy machinery    Do any other dangerous activities while taking these medicines.     Drink any alcohol while taking these medicines.      Take with any other medicines that contain acetaminophen. Read all labels carefully. Look for the word  acetaminophen  or  Tylenol.  Ask your pharmacist if you have questions or are unsure.    Store your pills in a secure place, locked if possible. We will not replace any lost or stolen medicine. If you don t finish your medicine, please  throw away (dispose) as directed by your pharmacist. The [place] [employer]ution [employer] has more information about safe disposal: https://www.pca.[facility].[place].us/living-green/managing-unwanted-medications    All opioids tend to cause constipation. Drink plenty of water and eat foods that have a lot of fiber, such as fruits, vegetables, prune juice, apple juice and high-fiber cereal. Take a laxative (Miralax, milk of magnesia, Colace, Senna) if you don t move your bowels at least every other day.              Medication List: This is a list of all your medications and when to take them. Check marks below indicate your daily home schedule. Keep this list as a reference.      Medications           Morning Afternoon Evening Bedtime As Needed    fluticasone 50 MCG/ACT spray   Commonly known as:  FLONASE   USE ONE SPRAY(S) IN EACH NOSTRIL ONCE DAILY *  NEEDS  OFFICE  VISIT  PRIOR  TO  FURTHER  REFILL  *                                order for DME   CPAP: 8 cm H2O  Lifetime need and heated humidity.                                oxyCODONE-acetaminophen 5-325 MG per tablet   Commonly known as:  PERCOCET   Take 1-2 tablets by mouth every 4 hours as needed for pain (moderate to severe)                                ZYRTEC PO   OTC AS NEEDED

## 2018-05-09 NOTE — H&P (VIEW-ONLY)
[facility]  [address] [address]  [phone]  Dept: [phone]    PRE-OP EVALUATION:  Today's date: 2018    Emanuel Landeros (: 1965) presents for pre-operative evaluation assessment as requested by Dr. Ayala .  He requires evaluation and anesthesia risk assessment prior to undergoing surgery/procedure for treatment of Abdominal wall lipoma  .    Proposed Surgery/ Procedure: Lipoma of abdominal wall   Date of Surgery/ Procedure: 2018   Time of Surgery/ Procedure: 10:00 AM  Hospital/Surgical Facility: [facility]  Primary Physician: Hema Weaver  Type of Anesthesia Anticipated: General    Patient has a Health Care Directive or Living Will:  NO    1. NO - Do you have a history of heart attack, stroke, stent, bypass or surgery on an artery in the head, neck, heart or legs?  2. NO - Do you ever have any pain or discomfort in your chest?  3. NO - Do you have a history of  Heart Failure?  4. NO - Are you troubled by shortness of breath when: walking on the level, up a slight hill or at night?  5. NO - Do you currently have a cold, bronchitis or other respiratory infection?  6. NO - Do you have a cough, shortness of breath or wheezing?  7. NO - Do you sometimes get pains in the calves of your legs when you walk?  8. NO - Do you or anyone in your family have previous history of blood clots?  9. NO - Do you or does anyone in your family have a serious bleeding problem such as prolonged bleeding following surgeries or cuts?  10. NO - Have you ever had problems with anemia or been told to take iron pills?  11. NO - Have you had any abnormal blood loss such as black, tarry or bloody stools, or abnormal vaginal bleeding?  12. NO - Have you ever had a blood transfusion?  13. NO - Have you or any of your relatives ever had problems with anesthesia?  14. YES - DO YOU HAVE SLEEP APNEA, EXCESSIVE SNORING OR DAYTIME DROWSINESS? Has sleep apnea, uses C pap   15. NO - Do you  have any prosthetic heart valves?  16. NO - Do you have prosthetic joints?  17. NO - Is there any chance that you may be pregnant?      HPI:     HPI related to upcoming procedure: Patient noted a LEFT UPPER QUADRANT lump that is painful if he is bending over about a month ago    Cardiac:  Historically has had low heart rate. He had some dizziness 2 months ago (attributed it to Flu). He is now noting some fatigue with activity. He does relay he is not sleeping well with CPAP- usually 4 hours at a time at best.     See problem list for active medical problems.  Problems all longstanding and stable, except as noted/documented.  See ROS for pertinent symptoms related to these conditions.                                                                                                                                                          .  MEDICAL HISTORY:     Patient Active Problem List    Diagnosis Date Noted     TERESA (obstructive sleep apnea) 07/24/2012     Priority: Medium     7/24/2012: Mild TERESA (AHI ~11, eloy desat ~88%, strong positional component)       CARDIOVASCULAR SCREENING; LDL GOAL LESS THAN 160 10/31/2010     Priority: Medium     Testosterone deficiency 06/28/2010     Priority: Medium      History reviewed. No pertinent past medical history.  Past Surgical History:   Procedure Laterality Date     C6-C7 fusion  2006     [facility]      Current Outpatient Prescriptions   Medication Sig Dispense Refill     fluticasone (FLONASE) 50 MCG/ACT spray USE ONE SPRAY(S) IN EACH NOSTRIL ONCE DAILY *  NEEDS  OFFICE  VISIT  PRIOR  TO  FURTHER  REFILL  * 1 Bottle 3     ORDER FOR DME CPAP:  8 cm H2O    Lifetime need and heated humidity.      1 each      ZYRTEC PO OTC AS NEEDED       OTC products: None, except as noted above    No Known Allergies   Latex Allergy: NO    Social History   Substance Use Topics     Smoking status: Former Smoker     Quit date: 6/17/2003     Smokeless tobacco: Never Used     Alcohol use Yes       "Comment: minimal     History   Drug Use No     Comment: [email or web address]       REVIEW OF SYSTEMS:   CONSTITUTIONAL: NEGATIVE for fever, chills, change in weight  INTEGUMENTARY/SKIN: NEGATIVE for worrisome rashes, moles or lesions  EYES: NEGATIVE for vision changes or irritation- reading glasses  ENT/MOUTH: NEGATIVE for ear, mouth and throat problems- sometimes ringing of ears  RESP: NEGATIVE for significant cough or SOB  BREAST: NEGATIVE for masses, tenderness or discharge  CV: NEGATIVE for chest pain, palpitations or peripheral edema, low heart rate, noting exercise intolerance, and increased fatigue  GI: NEGATIVE for nausea, abdominal pain, heartburn, or change in bowel habits, mass to LEFT UPPER QUADRANT only painful when he bends over X 1 month  : NEGATIVE for frequency, dysuria, or hematuria  MUSCULOSKELETAL: NEGATIVE for significant arthralgias or myalgia  NEURO: NEGATIVE for weakness, dizziness or paresthesias  ENDOCRINE: NEGATIVE for temperature intolerance, skin/hair changes  HEME: NEGATIVE for bleeding problems  PSYCHIATRIC: NEGATIVE for changes in mood or affect    EXAM:   /84 (BP Location: Right arm, Patient Position: Sitting, Cuff Size: Adult Large)  Pulse (!) 46  Temp 98.1  F (36.7  C) (Tympanic)  Resp 16  Ht 5' 8\" (1.727 m)  Wt 200 lb (90.7 kg)  BMI 30.41 kg/m2    GENERAL APPEARANCE: healthy, alert and no distress     EYES: EOMI,  PERRL     HENT: ear canals and TM's normal and nose and mouth without ulcers or lesions     NECK: no adenopathy, no asymmetry, masses, or scars and thyroid normal to palpation     RESP: lungs clear to auscultation - no rales, rhonchi or wheezes     CV: regular rates and rhythm, normal S1 S2, no S3 or S4 and grade 2/6 systolic murmur best heard over pulmonic area, click or rub     ABDOMEN:  soft, nontender, no HSM or masses and bowel sounds normal, ill-defined non-tender mass to LEFT UPPER QUADRANT covering edge of costal margin, perhaps 6 cmX 6 cm     MS: extremities " normal- no gross deformities noted, no evidence of inflammation in joints, FROM in all extremities.     SKIN: no suspicious lesions or rashes     NEURO: Normal strength and tone, sensory exam grossly normal, mentation intact and speech normal     PSYCH: mentation appears normal. and affect normal/bright     LYMPHATICS: No cervical adenopathy    DIAGNOSTICS:   EKG: sinus bradycardia, normal axis, normal intervals, no acute ST/T changes c/w ischemia, no LVH by voltage criteria, there are no prior tracings available  Results for orders placed or performed in visit on 05/02/18   CBC with platelets   Result Value Ref Range    WBC 7.5 4.0 - 11.0 10e9/L    RBC Count 4.49 4.4 - 5.9 10e12/L    Hemoglobin 14.1 13.3 - 17.7 g/dL    Hematocrit 40.9 40.0 - 53.0 %    MCV 91 78 - 100 fl    MCH 31.4 26.5 - 33.0 pg    MCHC 34.5 31.5 - 36.5 g/dL    RDW 12.6 10.0 - 15.0 %    Platelet Count 251 150 - 450 10e9/L     Interpretation Summary  Sinus bradycardia with normal resting blood pressure. With exercise there is a  normal heart rate and a hypertensive blood pressure response.  Average exercise capacity for age  Sinus bradycardia with no significant ECG abnormalities at rest, no ischemic  changes with exercise.  Normal resting LV function with good augmentation of all wall segments post  exercise.  This is a normal stress echo indicating low probability of significant  exercise-induced myocardial ischemia.     Functionally bicuspid aortic valve with fusion of the right and noncoronary  cusps.  Mild valvular aortic stenosis.  Mean gradient 14 mmHg, calculated aortic valve area 1.8 cmÂ  which is  consistent with its appearance.  The ascending aorta is Mildly dilated.  _____________________________________________________________________________  __     Stress  Exercise was stopped due to fatigue.  There was a borderline hypertensive BP response to exercise.  The patient exhibited no chest pain during exercise.  Target Heart Rate was  achieved.  The EKG portion of this stress test was negative for inducible ischemia (see  echo results below).  The visual ejection fraction is estimated at >70%.  Normal left ventricular function and wall motion at rest and post-stress.  Left ventricular cavity size decreases with exercise.  Global LV systolic function augments with exercise.     Baseline  Normal baseline electrocardiogram.  With sinus bradycardia.  Normal left ventricular function and wall motion at rest.  The visual ejection fraction is estimated at 60-65%.     Stress Results         Protocol:  [facility]        Maximum Predicted HR:   170 bpm         Target HR: 145 bpm               % Maximum Predicted HR: 92 %                Stage  DurationHeart Rate  BP            Comment                    (mm:ss)   (bpm)            Stage 1   3:00      85    150/80            Stage 2   3:00      98    170/68            Stage 3   3:00     116    200/68            Stage 4   2:17     157      /   [id number] FAC:ABOVE/DUKE 10           RECOVERYR  5:29      87    132/68                Stress Duration:   11:17 mm:ss        Recovery Time: 5:29 mm:ss          Maximum Stress HR: 157 bpm            METS:          13     Aortic Valve  Functionally bicuspid aortic valve with fusion of the right and noncoronary  cusps. Mild valvular aortic stenosis. Mean gradient 14 mmHg, calculated aortic  valve area 1.8 cmÂ  which is consistent with its appearance.        Vessels  The aortic root is normal size. The ascending aorta is Mildly dilated.  _____________________________________________________________________________  __  MMode/2D Measurements & Calculations     Ao root diam: 3.5 cm  asc Aorta Diam: 4.0 cm  LVOT diam: 2.5 cm  LVOT area: 4.9 cm2        Doppler Measurements & Calculations  Ao V2 max: 271.5 cm/sec  Ao max P.0 mmHg  Ao V2 mean: 173.0 cm/sec  Ao mean P.0 mmHg  Ao V2 VTI: 55.8 cm  RENETTA(I,D): 2.1 cm2  RENETTA(V,D): 1.8 cm2  LV V1 max PG: 3.9 mmHg  LV V1 max:  99.3 cm/sec  LV V1 VTI: 23.8 cm  SV(LVOT): 116.8 ml  SI(LVOT): 57.2 ml/m2  AV Alen Ratio (DI): 0.37  RENETTA Index (cm2/m2): 1.0              _____________________________________________________________________________  __        Report approved by: Earnestine Raman 05/07/2018 02:51 PM  IMPRESSION:   Reason for surgery/procedure: Lipoma of LEFT UPPER QUADRANT abdomen  Diagnosis/reason for consult: surgical risk    The proposed surgical procedure is considered INTERMEDIATE risk.    REVISED CARDIAC RISK INDEX  The patient has the following serious cardiovascular risks for perioperative complications such as (MI, PE, VFib and 3  AV Block):  No serious cardiac risks  INTERPRETATION: 0 risks: Class I (very low risk - 0.4% complication rate)    The patient has the following additional risks for perioperative complications:  No identified additional risks      ICD-10-CM    1. Pre-op exam Z01.818 CBC with platelets     EKG 12-lead complete w/read - Clinics   2. Lipoma of abdominal wall D17.1 CBC with platelets     EKG 12-lead complete w/read - Clinics   3. Sinus bradycardia R00.1 Exercise Stress Echocardiogram   4. TERESA (obstructive sleep apnea) G47.33        RECOMMENDATIONS:   -Patient is to take all scheduled medications on the day of surgery EXCEPT for modifications listed below.    PRE-OP IS APPROVED as of 1557 on 5/8/2018. VIOLETA Vargas      FURTHER TESTING NEEDED for new cardiac symptoms.  Exercise stress test to be done within the week. If negative, would recommend patient proceed to surgery without further evaluation.     Exercise stress test was normal as of 1557 on 5/8/2018. VIOLETA Vargas    Signed Electronically by: Charlene Ramos CNP    Copy of this evaluation report is provided to requesting physician.    [facility]    Revised Cardiac Risk Index    Patient Instructions     1. Pre-op exam  - CBC with platelets  - EKG 12-lead complete w/read - Clinics (sinus bradycardia  47 bpm)  Results for orders placed or performed in visit on 05/02/18   CBC with platelets   Result Value Ref Range    WBC 7.5 4.0 - 11.0 10e9/L    RBC Count 4.49 4.4 - 5.9 10e12/L    Hemoglobin 14.1 13.3 - 17.7 g/dL    Hematocrit 40.9 40.0 - 53.0 %    MCV 91 78 - 100 fl    MCH 31.4 26.5 - 33.0 pg    MCHC 34.5 31.5 - 36.5 g/dL    RDW 12.6 10.0 - 15.0 %    Platelet Count 251 150 - 450 10e9/L       2. Lipoma of abdominal wall  Acute  - CBC with platelets  - EKG 12-lead complete w/read - Clinics    3. Sinus bradycardia  Chronic, symptoms of fatigue/ mild exercise intolerance  - Exercise Stress Echocardiogram; Future in [place] ([facility] or Cariology department will call you tomorrow to schedule)  - Once stress test is done, and if normal, I'll okay your pre-op.   - If abnormal stress test, then referral to Cardiology in [place]    4. TERESA (obstructive sleep apnea)  Chronic, stable  - Continue CPAP    Before Your Surgery      Call your surgeon if there is any change in your health. This includes signs of a cold or flu (such as a sore throat, runny nose, cough, rash or fever).    Do not smoke, drink alcohol or take over the counter medicine (unless your surgeon or primary care doctor tells you to) for the 24 hours before and after surgery.    If you take prescribed drugs: Follow your doctor s orders about which medicines to take and which to stop until after surgery.    Eating and drinking prior to surgery: follow the instructions from your surgeon    Take a shower or bath the night before surgery. Use the soap your surgeon gave you to gently clean your skin. If you do not have soap from your surgeon, use your regular soap. Do not shave or scrub the surgery site.  Wear clean pajamas and have clean sheets on your bed.

## 2018-05-09 NOTE — ANESTHESIA POSTPROCEDURE EVALUATION
Patient: Emanuel Landeros    Procedure(s):  Excision soft tissue mass left abdomen - Wound Class: I-Clean    Diagnosis:Lipoma of abdominal wall  Diagnosis Additional Information: No value filed.    Anesthesia Type:  MAC    Note:  Anesthesia Post Evaluation    Patient location during evaluation: Phase 2 and Bedside  Patient participation: Able to fully participate in evaluation  Level of consciousness: awake and alert  Pain management: adequate  Airway patency: patent  Cardiovascular status: acceptable  Respiratory status: acceptable  Hydration status: acceptable  PONV: none     Anesthetic complications: None          Last vitals:  Vitals:    05/09/18 1100 05/09/18 1119   BP: 109/65    Pulse: (!) 42    Resp: 16    Temp: 36.7  C (98  F)    SpO2: 97% 97%         Electronically Signed By: Burt Chatterjee CRNA, APRN CRNA  May 9, 2018  1:19 PM

## 2018-05-09 NOTE — ANESTHESIA PREPROCEDURE EVALUATION
Anesthesia Evaluation     . Pt has had prior anesthetic. Type: General and MAC    No history of anesthetic complications          ROS/MED HX    ENT/Pulmonary:     (+)sleep apnea, TERESA risk factors uses CPAP , . .    Neurologic:  - neg neurologic ROS     Cardiovascular:  - neg cardiovascular ROS   (+) ----. : . . . :. . Previous cardiac testing Echodate:5/7/2018results:Stress Echo Complete. Contrast Optison.  _____________________________________________________________________________  __          Interpretation Summary  Sinus bradycardia with normal resting blood pressure. With exercise there is a  normal heart rate and a hypertensive blood pressure response.  Average exercise capacity for age  Sinus bradycardia with no significant ECG abnormalities at rest, no ischemic  changes with exercise.  Normal resting LV function with good augmentation of all wall segments post  exercise.  This is a normal stress echo indicating low probability of significant  exercise-induced myocardial ischemia.      Functionally bicuspid aortic valve with fusion of the right and noncoronary  cusps.  Mild valvular aortic stenosis.  Mean gradient 14 mmHg, calculated aortic valve area 1.8 cmÂ  which is  consistent with its appearance.  The ascending aorta is Mildly dilated.  _____________________________________________________________________________  __      Stress  Exercise was stopped due to fatigue.  There was a borderline hypertensive BP response to exercise.  The patient exhibited no chest pain during exercise.  Target Heart Rate was achieved.  The EKG portion of this stress test was negative for inducible ischemia (see  echo results below).  The visual ejection fraction is estimated at >70%.  Normal left ventricular function and wall motion at rest and post-stress.  Left ventricular cavity size decreases with exercise.  Global LV systolic function augments with exercise.      Baseline  Normal baseline electrocardiogram.  With sinus  bradycardia.  Normal left ventricular function and wall motion at rest.  The visual ejection fraction is estimated at 60-65%.      Stress Results   Protocol: [facility]      Maximum Predicted HR:   170 bpm         Target HR: 145 bpm               % Maximum Predicted HR: 92 %                 Stage  DurationHeart Rate  BP            Comment                    (mm:ss)   (bpm)            Stage 1   3:00      85    150/80            Stage 2   3:00      98    170/68            Stage 3   3:00     116    200/68            Stage 4   2:17     157      /   [id number] FAC:ABOVE/[facility]KE 10           RECOVERYR  5:29      87    132/68                  Stress Duration:   11:17 mm:ss        Recovery Time: 5:29 mm:ss          Maximum Stress HR: 157 bpm            METS:          13      Aortic Valve  Functionally bicuspid aortic valve with fusion of the right and noncoronary  cusps. Mild valvular aortic stenosis. Mean gradient 14 mmHg, calculated aortic  valve area 1.8 cmÂ  which is consistent with its appearance.          Vessels  The aortic root is normal size. The ascending aorta is Mildly dilated.  _____________________________________________________________________________  __  MMode/2D Measurements & Calculations      Ao root diam: 3.5 cm  asc Aorta Diam: 4.0 cm  LVOT diam: 2.5 cm  LVOT area: 4.9 cm2          Doppler Measurements & Calculations  Ao V2 max: 271.5 cm/sec  Ao max P.0 mmHg  Ao V2 mean: 173.0 cm/sec  Ao mean P.0 mmHg  Ao V2 VTI: 55.8 cm  RENETTA(I,D): 2.1 cm2  RENETTA(V,D): 1.8 cm2  LV V1 max PG: 3.9 mmHg  LV V1 max: 99.3 cm/sec  LV V1 VTI: 23.8 cm  SV(LVOT): 116.8 ml  SI(LVOT): 57.2 ml/m2  AV Alen Ratio (DI): 0.37  RENETTA Index (cm2/m2): 1.0                  _____________________________________________________________________________  __          Report approved by: Earnestine Raman 2018 02:51 PMdate: results: date: results: date: results:          METS/Exercise Tolerance:  >4 METS   Hematologic:  - neg  hematologic  ROS       Musculoskeletal:  - neg musculoskeletal ROS       GI/Hepatic:  - neg GI/hepatic ROS       Renal/Genitourinary:  - ROS Renal section negative       Endo:  - neg endo ROS       Psychiatric:  - neg psychiatric ROS       Infectious Disease:  - neg infectious disease ROS       Malignancy:      - no malignancy   Other:    - neg other ROS                 Physical Exam  Normal systems: cardiovascular and pulmonary    Airway   Mallampati: III  TM distance: >3 FB  Neck ROM: full    Dental     Cardiovascular       Pulmonary                     Anesthesia Plan      History & Physical Review      ASA Status:  2 .    NPO Status:  > 4 hours and > 8 hours (Clear liquids 4, food 8 )    Plan for MAC Reason for MAC:  Deep or markedly invasive procedure (G8)  PONV prophylaxis:  Ondansetron (or other 5HT-3) and Dexamethasone or Solumedrol       Postoperative Care  Postoperative pain management:  IV analgesics and Oral pain medications.      Consents  Anesthetic plan, risks, benefits and alternatives discussed with:  Patient..                          .

## 2018-05-10 NOTE — TELEPHONE ENCOUNTER
Pt wife calling stating she would like to have the paperwork have him be covered for 4 weeks from 4/26/18 to 5/26/18 for the f/u visits.     Eleanor Vega  Specialty CSS

## 2018-05-10 NOTE — TELEPHONE ENCOUNTER
Paperwork partially filled out and set on provider's desk to finish and sign. Do not know provider's protocol for restrictions after surgery.    Conrad MONTEIRO, CMA

## 2018-05-11 LAB — COPATH REPORT: NORMAL

## 2018-05-11 NOTE — TELEPHONE ENCOUNTER
Pt requesting 4 weeks off.  Per Dr Ayala- he can have off the surgery date but will need to know specific job descriptions before allowing further time off.  In most cases pt's are able to go back to work after the minor procedure of a lipoma removal.  LM for pt requesting a call back.    Betty FRANKLIN CMA

## 2018-05-14 NOTE — TELEPHONE ENCOUNTER
Pt returned call - Is requesting the days that he was seen in clinic for the initial visit and the preop and the surgery date to be covered under FMLA, also any days that he was prescribed medication.     Pt's employment:   for [place] [employer]    Return call @:  [phone] - work (ok to leave message)    Denise Behrendt  Specialty CSS

## 2018-05-22 ENCOUNTER — OFFICE VISIT (OUTPATIENT)
Dept: SURGERY | Facility: CLINIC | Age: 53
End: 2018-05-22
Payer: COMMERCIAL

## 2018-05-22 VITALS
HEART RATE: 62 BPM | HEIGHT: 68 IN | BODY MASS INDEX: 30.31 KG/M2 | TEMPERATURE: 97.9 F | SYSTOLIC BLOOD PRESSURE: 129 MMHG | DIASTOLIC BLOOD PRESSURE: 72 MMHG | WEIGHT: 200 LBS

## 2018-05-22 DIAGNOSIS — D17.1 LIPOMA OF ABDOMINAL WALL: Primary | ICD-10-CM

## 2018-05-22 PROCEDURE — 99024 POSTOP FOLLOW-UP VISIT: CPT | Performed by: SURGERY

## 2018-05-22 NOTE — MR AVS SNAPSHOT
"              After Visit Summary   5/22/2018    Emanuel Landeros    MRN: [id number]           Patient Information     Date Of Birth          1965        Visit Information        Provider Department      5/22/2018 12:45 PM Froylan Ayala MD [facility]        Today's Diagnoses     Lipoma of abdominal wall    -  1       Follow-ups after your visit        Your next 10 appointments already scheduled     Jun 18, 2018  3:45 PM CDT   Return Visit with PATTI Eisenberg MD   [facility] ([facility])    [address] [address] [phone]              Who to contact     If you have questions or need follow up information about today's clinic visit or your schedule please contact [facility] directly at [phone].  Normal or non-critical lab and imaging results will be communicated to you by MyChart, letter or phone within 4 business days after the clinic has received the results. If you do not hear from us within 7 days, please contact the clinic through MyChart or phone. If you have a critical or abnormal lab result, we will notify you by phone as soon as possible.  Submit refill requests through MeeWee or call your pharmacy and they will forward the refill request to us. Please allow 3 business days for your refill to be completed.          Additional Information About Your Visit        MyChart Information     MeeWee lets you send messages to your doctor, view your test results, renew your prescriptions, schedule appointments and more. To sign up, go to [email or web address] . Click on \"Log in\" on the left side of the screen, which will take you to the Welcome page. Then click on \"Sign up Now\" on the right side of the page.     You will be asked to enter the access code listed below, as well as some personal information. Please follow the directions to create your username and password.     Your access code is: [email or web address]  Expires: " "2018  3:29 PM     Your access code will  in 90 days. If you need help or a new code, please call your [facility] or [phone].        Care EveryWhere ID     This is your Care EveryWhere ID. This could be used by other organizations to access your [facility] medical records  [id number]        Your Vitals Were     Pulse Temperature Height BMI (Body Mass Index)          62 97.9  F (36.6  C) (Tympanic) 1.727 m (5' 8\") 30.41 kg/m2         Blood Pressure from Last 3 Encounters:   18 129/72   18 108/71   18 132/84    Weight from Last 3 Encounters:   18 90.7 kg (200 lb)   18 90.7 kg (200 lb)   18 90.7 kg (200 lb)              Today, you had the following     No orders found for display       Primary Care Provider Office Phone # Fax #    Hema Boykin MD Meg [phone]       [address] [place] [address]        Equal Access to Services     [facility]: Hadii matt cxo hadasho Soese, waaxda luqadaha, qaybta kaalmaidania phillips, ella velázquez . So [facility] [phone].    ATENCIÓN: Si habla español, tiene a green disposición servicios gratuitos de asistencia lingüística. Adolfo al [phone].    We comply with applicable federal civil rights laws and [place] laws. We do not discriminate on the basis of race, color, national origin, age, disability, sex, sexual orientation, or gender identity.            Thank you!     Thank you for choosing [facility]  for your care. Our goal is always to provide you with excellent care. Hearing back from our patients is one way we can continue to improve our services. Please take a few minutes to complete the written survey that you may receive in the mail after your visit with us. Thank you!             Your Updated Medication List - Protect others around you: Learn how to safely use, store and throw away your medicines at www.disposemymeds.org.          This list is accurate as " of 5/22/18  1:02 PM.  Always use your most recent med list.                   Brand Name Dispense Instructions for use Diagnosis    fluticasone 50 MCG/ACT spray    FLONASE    1 Bottle    USE ONE SPRAY(S) IN EACH NOSTRIL ONCE DAILY *  NEEDS  OFFICE  VISIT  PRIOR  TO  FURTHER  REFILL  *    Seasonal allergic rhinitis       order for DME     1 each    CPAP: 8 cm H2O  Lifetime need and heated humidity.    TERESA (obstructive sleep apnea)       ZYRTEC PO      OTC AS NEEDED

## 2018-05-22 NOTE — LETTER
"    5/22/2018         RE: Emanuel Landeros  [address]  [facility] [address]        Dear Colleague,    Thank you for referring your patient, Emanuel Landeros, to the [facility]. Please see a copy of my visit note below.    General Surgery Post Op    Pt returns for follow up visit s/p excision LUQ lipoma on 5/9/18.    Patient has been doing well, no pain problems, no wound healing issues. Area can feel tight if stretches out. Was back to work few days after surgery    Physical exam: Vitals: /72 (BP Location: Right arm, Patient Position: Sitting, Cuff Size: Adult Regular)  Pulse 62  Temp 97.9  F (36.6  C) (Tympanic)  Ht 1.727 m (5' 8\")  Wt 90.7 kg (200 lb)  BMI 30.41 kg/m2  BMI= Body mass index is 30.41 kg/(m^2).    Exam:  Constitutional: healthy, alert and no distress  Abd: LUQ incision healed well. Some tissue swelling yet so area is prominent otherwise normal    Path:  FINAL DIAGNOSIS:   Left abdominal mass:   - Lipoma.    Assessment:     ICD-10-CM    1. Lipoma of abdominal wall D17.1      Plan: Did not have discreet mass just what appeared to be extra subcutaneous tissue which I removed. Nothing abnormal on pathology. Healing fine. Discussed findings with patient. Follow up as needed    Froylan Ayala MD        Again, thank you for allowing me to participate in the care of your patient.        Sincerely,        Froylan Ayala MD    "

## 2018-05-22 NOTE — NURSING NOTE
"Initial /72 (BP Location: Right arm, Patient Position: Sitting, Cuff Size: Adult Regular)  Pulse 62  Temp 97.9  F (36.6  C) (Tympanic)  Ht 1.727 m (5' 8\")  Wt 90.7 kg (200 lb)  BMI 30.41 kg/m2 Estimated body mass index is 30.41 kg/(m^2) as calculated from the following:    Height as of this encounter: 1.727 m (5' 8\").    Weight as of this encounter: 90.7 kg (200 lb). .    Lisbeth Stinson MA    "

## 2018-05-22 NOTE — PROGRESS NOTES
"General Surgery Post Op    Pt returns for follow up visit s/p excision LUQ lipoma on 5/9/18.    Patient has been doing well, no pain problems, no wound healing issues. Area can feel tight if stretches out. Was back to work few days after surgery    Physical exam: Vitals: /72 (BP Location: Right arm, Patient Position: Sitting, Cuff Size: Adult Regular)  Pulse 62  Temp 97.9  F (36.6  C) (Tympanic)  Ht 1.727 m (5' 8\")  Wt 90.7 kg (200 lb)  BMI 30.41 kg/m2  BMI= Body mass index is 30.41 kg/(m^2).    Exam:  Constitutional: healthy, alert and no distress  Abd: LUQ incision healed well. Some tissue swelling yet so area is prominent otherwise normal    Path:  FINAL DIAGNOSIS:   Left abdominal mass:   - Lipoma.    Assessment:     ICD-10-CM    1. Lipoma of abdominal wall D17.1      Plan: Did not have discreet mass just what appeared to be extra subcutaneous tissue which I removed. Nothing abnormal on pathology. Healing fine. Discussed findings with patient. Follow up as needed    Froylan Ayala MD      "

## 2018-07-09 DIAGNOSIS — R97.20 ELEVATED PROSTATE SPECIFIC ANTIGEN (PSA): ICD-10-CM

## 2018-07-09 LAB — PSA SERPL-ACNC: 0.36 UG/L (ref 0–4)

## 2018-07-09 PROCEDURE — G0103 PSA SCREENING: HCPCS | Performed by: UROLOGY

## 2018-08-13 ENCOUNTER — OFFICE VISIT (OUTPATIENT)
Dept: UROLOGY | Facility: CLINIC | Age: 53
End: 2018-08-13
Payer: COMMERCIAL

## 2018-08-13 VITALS
HEIGHT: 68 IN | SYSTOLIC BLOOD PRESSURE: 124 MMHG | BODY MASS INDEX: 30.31 KG/M2 | WEIGHT: 200 LBS | RESPIRATION RATE: 18 BRPM | HEART RATE: 49 BPM | DIASTOLIC BLOOD PRESSURE: 77 MMHG

## 2018-08-13 DIAGNOSIS — Z80.42 FAMILY HISTORY OF PROSTATE CANCER IN FATHER: Primary | ICD-10-CM

## 2018-08-13 PROCEDURE — 99213 OFFICE O/P EST LOW 20 MIN: CPT | Performed by: UROLOGY

## 2018-08-13 NOTE — MR AVS SNAPSHOT
"              After Visit Summary   8/13/2018    Emanuel Landeros    MRN: [id number]           Patient Information     Date Of Birth          1965        Visit Information        Provider Department      8/13/2018 3:45 PM PATTI Eisenberg MD [facility]        Today's Diagnoses     Family history of prostate cancer in father    -  1      Care Instructions    Per Physician's instructions            Follow-ups after your visit        Who to contact     If you have questions or need follow up information about today's clinic visit or your schedule please contact [facility] directly at [phone].  Normal or non-critical lab and imaging results will be communicated to you by MyChart, letter or phone within 4 business days after the clinic has received the results. If you do not hear from us within 7 days, please contact the clinic through MyChart or phone. If you have a critical or abnormal lab result, we will notify you by phone as soon as possible.  Submit refill requests through FindTheBest or call your pharmacy and they will forward the refill request to us. Please allow 3 business days for your refill to be completed.          Additional Information About Your Visit        Care EveryWhere ID     This is your Care EveryWhere ID. This could be used by other organizations to access your [place] medical records  [id number]        Your Vitals Were     Pulse Respirations Height BMI (Body Mass Index)          49 18 1.727 m (5' 8\") 30.41 kg/m2         Blood Pressure from Last 3 Encounters:   08/13/18 124/77   05/22/18 129/72   05/09/18 108/71    Weight from Last 3 Encounters:   08/13/18 90.7 kg (200 lb)   05/22/18 90.7 kg (200 lb)   05/09/18 90.7 kg (200 lb)              Today, you had the following     No orders found for display       Primary Care Provider Office Phone # Fax #    Hema Ur MD Meg [phone]       [address] [place] [address]        Equal Access to " Services     [facility] : Hadii matt Cardenas, waeshada luqadaha, qaybta kaalmaidania phillips, ella velázquez . So [facility] [phone].    ATENCIÓN: Si habla español, tiene a green disposición servicios gratuitos de asistencia lingüística. Llame al [phone].    We comply with applicable federal civil rights laws and [place] laws. We do not discriminate on the basis of race, color, national origin, age, disability, sex, sexual orientation, or gender identity.            Thank you!     Thank you for choosing [facility]  for your care. Our goal is always to provide you with excellent care. Hearing back from our patients is one way we can continue to improve our services. Please take a few minutes to complete the written survey that you may receive in the mail after your visit with us. Thank you!             Your Updated Medication List - Protect others around you: Learn how to safely use, store and throw away your medicines at www.disposemymeds.org.          This list is accurate as of 8/13/18  5:42 PM.  Always use your most recent med list.                   Brand Name Dispense Instructions for use Diagnosis    fluticasone 50 MCG/ACT spray    FLONASE    1 Bottle    USE ONE SPRAY(S) IN EACH NOSTRIL ONCE DAILY *  NEEDS  OFFICE  VISIT  PRIOR  TO  FURTHER  REFILL  *    Seasonal allergic rhinitis       order for DME     1 each    CPAP: 8 cm H2O  Lifetime need and heated humidity.    TERESA (obstructive sleep apnea)       ZYRTEC PO      OTC AS NEEDED

## 2018-08-13 NOTE — PROGRESS NOTES
Appointment source: Established Patient  Patient name: Emanuel Landeros  Urology Staff: Diogenes Eisenberg MD    Subjective: This is a 52 year old year old male returning for follow up of PSA screening    Objective:  Has minimal voiding complaints primarily mild nocturia.    Prostate benign to palpation. PSA in 7/18 0.36 ng/ml    Assessment:  Satisfactory follow up prostate screening for family history of prostate cancer.    Plan:  Return in one year. Consider tamsulosin for management of his nocturia.    Total time 20 minutes, counseling 15 minutes discussing prostate cancer screening.

## 2018-08-13 NOTE — NURSING NOTE
"Chief Complaint   Patient presents with     RECHECK     Prostate Ca screening        Initial /77 (BP Location: Right arm, Patient Position: Chair, Cuff Size: Adult Regular)  Pulse (!) 49  Resp 18  Ht 1.727 m (5' 8\")  Wt 90.7 kg (200 lb)  BMI 30.41 kg/m2 Estimated body mass index is 30.41 kg/(m^2) as calculated from the following:    Height as of this encounter: 1.727 m (5' 8\").    Weight as of this encounter: 90.7 kg (200 lb).  BP completed using cuff size: regular  Medications and allergies reviewed.      Betty FRANKLIN CMA     "

## 2018-11-20 ENCOUNTER — RADIANT APPOINTMENT (OUTPATIENT)
Dept: GENERAL RADIOLOGY | Facility: CLINIC | Age: 53
End: 2018-11-20
Attending: FAMILY MEDICINE
Payer: COMMERCIAL

## 2018-11-20 ENCOUNTER — ALLIED HEALTH/NURSE VISIT (OUTPATIENT)
Dept: FAMILY MEDICINE | Facility: CLINIC | Age: 53
End: 2018-11-20
Payer: COMMERCIAL

## 2018-11-20 VITALS
WEIGHT: 210 LBS | HEIGHT: 68 IN | HEART RATE: 56 BPM | TEMPERATURE: 98.6 F | SYSTOLIC BLOOD PRESSURE: 120 MMHG | RESPIRATION RATE: 12 BRPM | DIASTOLIC BLOOD PRESSURE: 72 MMHG | BODY MASS INDEX: 31.83 KG/M2 | OXYGEN SATURATION: 97 %

## 2018-11-20 DIAGNOSIS — M79.641 PAIN OF RIGHT HAND: ICD-10-CM

## 2018-11-20 DIAGNOSIS — Z23 NEED FOR PROPHYLACTIC VACCINATION AND INOCULATION AGAINST INFLUENZA: ICD-10-CM

## 2018-11-20 DIAGNOSIS — M79.674 GREAT TOE PAIN, RIGHT: ICD-10-CM

## 2018-11-20 DIAGNOSIS — M79.674 GREAT TOE PAIN, RIGHT: Primary | ICD-10-CM

## 2018-11-20 LAB
TSH SERPL DL<=0.005 MIU/L-ACNC: 3.97 MU/L (ref 0.4–4)
URATE SERPL-MCNC: 6.4 MG/DL (ref 3.5–7.2)

## 2018-11-20 PROCEDURE — 86618 LYME DISEASE ANTIBODY: CPT | Performed by: FAMILY MEDICINE

## 2018-11-20 PROCEDURE — 73130 X-RAY EXAM OF HAND: CPT | Mod: RT

## 2018-11-20 PROCEDURE — 73630 X-RAY EXAM OF FOOT: CPT | Mod: RT

## 2018-11-20 PROCEDURE — 36415 COLL VENOUS BLD VENIPUNCTURE: CPT | Performed by: FAMILY MEDICINE

## 2018-11-20 PROCEDURE — 90471 IMMUNIZATION ADMIN: CPT | Performed by: FAMILY MEDICINE

## 2018-11-20 PROCEDURE — 84443 ASSAY THYROID STIM HORMONE: CPT | Performed by: FAMILY MEDICINE

## 2018-11-20 PROCEDURE — 90686 IIV4 VACC NO PRSV 0.5 ML IM: CPT | Performed by: FAMILY MEDICINE

## 2018-11-20 PROCEDURE — 99213 OFFICE O/P EST LOW 20 MIN: CPT | Mod: 25 | Performed by: FAMILY MEDICINE

## 2018-11-20 PROCEDURE — 84550 ASSAY OF BLOOD/URIC ACID: CPT | Performed by: FAMILY MEDICINE

## 2018-11-20 RX ORDER — NAPROXEN 500 MG/1
500 TABLET ORAL 2 TIMES DAILY PRN
Qty: 30 TABLET | Refills: 1 | Status: SHIPPED | OUTPATIENT
Start: 2018-11-20 | End: 2021-06-30

## 2018-11-20 NOTE — NURSING NOTE
"Chief Complaint   Patient presents with     Swelling     right hand and right foot swelling, couple months       Initial /72  Pulse 56  Temp 98.6  F (37  C) (Tympanic)  Resp 12  Ht 5' 8\" (1.727 m)  Wt 210 lb (95.3 kg)  SpO2 97%  BMI 31.93 kg/m2 Estimated body mass index is 31.93 kg/(m^2) as calculated from the following:    Height as of this encounter: 5' 8\" (1.727 m).    Weight as of this encounter: 210 lb (95.3 kg).    Patient presents to the clinic using No DME    Health Maintenance that is potentially due pending provider review:  NONE    n/a    Is there anyone who you would like to be able to receive your results? No  If yes have patient fill out GIL    "

## 2018-11-20 NOTE — PROGRESS NOTES
SUBJECTIVE:   Emanuel Landeros is a 53 year old male who presents to clinic today for the following health issues:      Joint Pain- right hand and right foot     Onset: couple months    Description:   Location: right hand and foot  Character: Dull ache and hurts while walking on foot     Intensity: moderate    Progression of Symptoms: same    Accompanying Signs & Symptoms:  Other symptoms: swelling    History:   Previous similar pain: no       Precipitating factors:   Trauma or overuse: no     Alleviating factors:  Improved by: nothing        Problem list and histories reviewed & adjusted, as indicated.  Additional history: as documented    Patient Active Problem List   Diagnosis     Testosterone deficiency     CARDIOVASCULAR SCREENING; LDL GOAL LESS THAN 160     TERESA (obstructive sleep apnea)     Past Surgical History:   Procedure Laterality Date     C6-C7 fusion  2006     [facility]      EXCISE MASS ABDOMEN Left 5/9/2018    Procedure: EXCISE MASS ABDOMEN;  Excision soft tissue mass left abdomen;  Surgeon: Froylan Ayala MD;  Location: [facility] OR     ORTHOPEDIC SURGERY       SOFT TISSUE SURGERY         Social History   Substance Use Topics     Smoking status: Former Smoker     Quit date: 6/17/2003     Smokeless tobacco: Never Used     Alcohol use Yes      Comment: minimal     Family History   Problem Relation Age of Onset     Prostate Cancer Father      Prostate Cancer Paternal Grandfather          Current Outpatient Prescriptions   Medication Sig Dispense Refill     fluticasone (FLONASE) 50 MCG/ACT spray USE ONE SPRAY(S) IN EACH NOSTRIL ONCE DAILY *  NEEDS  OFFICE  VISIT  PRIOR  TO  FURTHER  REFILL  * (Patient not taking: Reported on 11/20/2018) 1 Bottle 3     ORDER FOR DME CPAP:  8 cm H2O    Lifetime need and heated humidity.      (Patient not taking: Reported on 8/13/2018) 1 each      ZYRTEC PO OTC AS NEEDED       No Known Allergies  No lab results found.   BP Readings from Last 3 Encounters:   11/20/18 120/72  "  08/13/18 124/77   05/22/18 129/72    Wt Readings from Last 3 Encounters:   11/20/18 210 lb (95.3 kg)   08/13/18 200 lb (90.7 kg)   05/22/18 200 lb (90.7 kg)                  Labs reviewed in EPIC    Reviewed and updated as needed this visit by clinical staff  Tobacco  Allergies  Med Hx  Surg Hx  Fam Hx  Soc Hx      Reviewed and updated as needed this visit by Provider         ROS:  Constitutional, HEENT, cardiovascular, pulmonary, gi and gu systems are negative, except as otherwise noted.    OBJECTIVE:     /72  Pulse 56  Temp 98.6  F (37  C) (Tympanic)  Resp 12  Ht 5' 8\" (1.727 m)  Wt 210 lb (95.3 kg)  SpO2 97%  BMI 31.93 kg/m2  Body mass index is 31.93 kg/(m^2).  GENERAL: alert and no distress  NECK: no adenopathy, no asymmetry, masses, or scars and thyroid normal to palpation  RESP: lungs clear to auscultation - no rales, rhonchi or wheezes  CV: regular rate and rhythm, normal S1 S2, no S3 or S4, no murmur, click or rub, no peripheral edema and peripheral pulses strong  ABDOMEN: soft, nontender, no hepatosplenomegaly, no masses and bowel sounds normal  MS: mildly swollen right MCP joint area without any significant skin discoloration or tenderness, subjective left great toe pain especially involving the dorsal aspect, no skin discoloration or joint swelling noted, ROM normal, pulses 3+, sensation to touch impression dry  NEURO: Normal strength and tone, mentation intact and speech normal    RIGHT FOOT THREE VIEWS   11/20/2018 4:21 PM      HISTORY: Great toe pain, right.     COMPARISON: None.         IMPRESSION: Small enthesophyte at the insertion of Achilles tendon. No  fracture, dislocation, or significant degenerative change.      RIGHT HAND THREE VIEWS   11/20/2018 4:19 PM      HISTORY: Right hand pain.     COMPARISON: None.         IMPRESSION: Normal.     KIMBERLEY TIWARI MD      ASSESSMENT/PLAN:         ICD-10-CM    1. Great toe pain, right M79.674 XR Foot Right G/E 3 Views     SPORTS " MEDICINE REFERRAL     TSH     naproxen (NAPROSYN) 500 MG tablet   2. Pain of right hand M79.641 XR Hand Right G/E 3 Views     Uric acid     Lyme Disease Negin with reflex to WB Serum     SPORTS MEDICINE REFERRAL     TSH     naproxen (NAPROSYN) 500 MG tablet       53-year-old male presents with localized pain involving right MCP joint area and right great toe for about 2 months.  No history of fever, chills, trauma or other injuries.  Denies any family history of gout or hypothyroidism.  X-ray right hand and right foot explained which came back unremarkable.  Differentials discussed in detail including metabolic, infectious and orthopedic etiology.  Lyme's serology, uric acid and TSH ordered for further evaluation.  Suggested to use naproxen for pain control and continue rest, icing.  Follow-up with a sports medicine is persistent or worsen.  Will inform him of the results once available.  Patient understood and in agreement with above plan.          Hema Weaver MD  [facility]

## 2018-11-20 NOTE — LETTER
November 23, 2018      Emanuel Landeros  [address] [facility] [address]        Dear ,    We are writing to inform you of your test results.             lyme's test, thyroid function and uric acid level came back normal. Let us know if there are any questions.          Resulted Orders   Uric acid   Result Value Ref Range    Uric Acid 6.4 3.5 - 7.2 mg/dL   Lyme Disease Negin with reflex to WB Serum   Result Value Ref Range    Lyme Disease Antibodies Serum 0.01 0.00 - 0.89      Comment:      Negative, Absence of detectable Borrelia burdorferi antibodies. A negative   result does not exclude the possibility of Borrelia burgdorferi infection. If   early Lyme disease is suspected, a second sample should be collected and   tested 2 to 4 weeks later.     TSH   Result Value Ref Range    TSH 3.97 0.40 - 4.00 mU/L       If you have any questions or concerns, please call the clinic at the number listed above.       Sincerely,        Hema Weaver MD/RONI

## 2018-11-21 LAB — B BURGDOR IGG+IGM SER QL: 0.01 (ref 0–0.89)

## 2018-12-05 ENCOUNTER — OFFICE VISIT (OUTPATIENT)
Dept: ORTHOPEDICS | Facility: CLINIC | Age: 53
End: 2018-12-05
Payer: COMMERCIAL

## 2018-12-05 VITALS
BODY MASS INDEX: 31.83 KG/M2 | HEIGHT: 68 IN | WEIGHT: 210 LBS | DIASTOLIC BLOOD PRESSURE: 82 MMHG | SYSTOLIC BLOOD PRESSURE: 126 MMHG

## 2018-12-05 DIAGNOSIS — M79.89 SWELLING OF RIGHT HAND: Primary | ICD-10-CM

## 2018-12-05 PROCEDURE — 99204 OFFICE O/P NEW MOD 45 MIN: CPT | Performed by: PEDIATRICS

## 2018-12-05 NOTE — PROGRESS NOTES
Sports Medicine Clinic Visit    PCP: Hema Weaver    Emanuel Landeros is a 53 year old male who is seen  in consultation at the request of  Hema Weaver M.D. presenting with right hand swelling.    Injury: He reports right hand and 2nd digit swelling for 2 months. He reports pain with pressure to the finger and gripping objects. He denies any history of redness, gout, tick bites.  No family history of rheumatoid arthritis or other inflammatory conditions. He is right hand dominate    Location of Pain: right hand and 2nd digit  Duration of Pain: 2 month(s)  Rating of Pain at worst: 8/10  Rating of Pain Currently: 2/10  Symptoms are better with: nothing  Symptoms are worse with: extension, flexion and gripping   Additional Features:   Positive: swelling   Negative: bruising, popping, grinding, catching, locking, instability, paresthesias, numbness and weakness  Other evaluation and/or treatments so far consists of: Ice, Tylenol, Ibuprofen, Other medications: naproxen and Rest  Prior History of related problems: nothing    Social History:  in corrections.    Review of Systems  Skin: no bruising, yes swelling  Musculoskeletal: as above  Neurologic: no numbness, paresthesias  Remainder of review of systems is negative including constitutional, CV, pulmonary, GI, except as noted in HPI or medical history.    Patient's current problem list, past medical and surgical history, and family history were reviewed.    Patient Active Problem List   Diagnosis     Testosterone deficiency     CARDIOVASCULAR SCREENING; LDL GOAL LESS THAN 160     TERESA (obstructive sleep apnea)     No past medical history on file.  Past Surgical History:   Procedure Laterality Date     C6-C7 fusion  2006     [facility]      EXCISE MASS ABDOMEN Left 5/9/2018    Procedure: EXCISE MASS ABDOMEN;  Excision soft tissue mass left abdomen;  Surgeon: Froylan Ayala MD;  Location: [facility] OR     ORTHOPEDIC SURGERY       SOFT TISSUE SURGERY    "    Family History   Problem Relation Age of Onset     Prostate Cancer Father      Prostate Cancer Paternal Grandfather        Objective  /82 (BP Location: Left arm, Patient Position: Chair, Cuff Size: Adult Regular)  Ht 5' 8\" (1.727 m)  Wt 210 lb (95.3 kg)  BMI 31.93 kg/m2    GENERAL APPEARANCE: healthy, alert and no distress   GAIT: NORMAL  SKIN: no suspicious lesions or rashes  HEENT: Sclera clear, anicteric  CV: good peripheral pulses  RESP: Breathing not labored  NEURO: Normal strength and tone, mentation intact and speech normal  PSYCH:  mentation appears normal and affect normal/bright    Bilateral Wrist and Hand exam    Inspection:       Swelling: right 2nd and 3rd MCP joints    Tender:       Right 2nd and 3rd MCP joints    Non Tender:       Remainder of the Wrist and Hand bilateral    ROM:       Full and symmetric active and passive range of motion of the forearm, wrist and digits bilateral    Strength:       5/5 strength in the muscles of the hand, wrist and forearm bilateral    Neurovascular:       2+ radial pulses bilaterally with brisk capillary refill and      normal sensation to light touch in the radial, median and ulnar nerve distributions      Radiology  I visualized and reviewed these images with the patient  Xr Hand Right G/e 3 Views  Result Date: 11/20/2018  RIGHT HAND THREE VIEWS   11/20/2018 4:19 PM HISTORY: Right hand pain. COMPARISON: None.   IMPRESSION: Normal. KIMBERLEY TIWARI MD    Labs:  Component      Latest Ref Rng & Units 11/20/2018   Uric Acid      3.5 - 7.2 mg/dL 6.4   Lyme Disease Antibodies Serum      0.00 - 0.89 0.01   TSH      0.40 - 4.00 mU/L 3.97       Assessment:  1. Swelling of right hand      Right MCP joint swelling.  Discussed differential including inflammatory arthritis and musculoskeletal including tendon issues.  I recommend lab evaluation first step, would consider rheumatology referral, MRI or therapy pending clinical course.    Plan:  - Today's Plan of " Care:  Lab work order    -We also discussed other future treatment options:  Referral to rheumatology, MRI of the hand or Rehab: Occupational Therapy    Follow Up: will call with lab results    Concerning signs and symptoms were reviewed.  The patient expressed understanding of this management plan and all questions were answered at this time.    Thanks for the opportunity to participate in the care of this patient, I will keep you updated on their progress.    CC: Hema Shirley MD CAQ  Primary Care Sports Medicine  [facility]

## 2018-12-05 NOTE — LETTER
12/5/2018         RE: Emanuel Landeros  [address]  [place] [address]        Dear Colleague,    Thank you for referring your patient, Emanuel Landeros, to the [facility]. Please see a copy of my visit note below.    Sports Medicine Clinic Visit    PCP: Hema Weaver    Emanuel Landeros is a 53 year old male who is seen  in consultation at the request of  Hema Weaver M.D. presenting with right hand swelling.    Injury: He reports right hand and 2nd digit swelling for 2 months. He reports pain with pressure to the finger and gripping objects. He denies any history of redness, gout, tick bites.  No family history of rheumatoid arthritis or other inflammatory conditions. He is right hand dominate    Location of Pain: right hand and 2nd digit  Duration of Pain: 2 month(s)  Rating of Pain at worst: 8/10  Rating of Pain Currently: 2/10  Symptoms are better with: nothing  Symptoms are worse with: extension, flexion and gripping   Additional Features:   Positive: swelling   Negative: bruising, popping, grinding, catching, locking, instability, paresthesias, numbness and weakness  Other evaluation and/or treatments so far consists of: Ice, Tylenol, Ibuprofen, Other medications: naproxen and Rest  Prior History of related problems: nothing    Social History:  in corrections.    Review of Systems  Skin: no bruising, yes swelling  Musculoskeletal: as above  Neurologic: no numbness, paresthesias  Remainder of review of systems is negative including constitutional, CV, pulmonary, GI, except as noted in HPI or medical history.    Patient's current problem list, past medical and surgical history, and family history were reviewed.    Patient Active Problem List   Diagnosis     Testosterone deficiency     CARDIOVASCULAR SCREENING; LDL GOAL LESS THAN 160     TERESA (obstructive sleep apnea)     No past medical history on file.  Past Surgical History:   Procedure Laterality  "Date     C6-C7 fusion  2006     [facility]      EXCISE MASS ABDOMEN Left 5/9/2018    Procedure: EXCISE MASS ABDOMEN;  Excision soft tissue mass left abdomen;  Surgeon: Froylan Ayala MD;  Location: [facility]     ORTHOPEDIC SURGERY       SOFT TISSUE SURGERY       Family History   Problem Relation Age of Onset     Prostate Cancer Father      Prostate Cancer Paternal Grandfather        Objective  /82 (BP Location: Left arm, Patient Position: Chair, Cuff Size: Adult Regular)  Ht 5' 8\" (1.727 m)  Wt 210 lb (95.3 kg)  BMI 31.93 kg/m2    GENERAL APPEARANCE: healthy, alert and no distress   GAIT: NORMAL  SKIN: no suspicious lesions or rashes  HEENT: Sclera clear, anicteric  CV: good peripheral pulses  RESP: Breathing not labored  NEURO: Normal strength and tone, mentation intact and speech normal  PSYCH:  mentation appears normal and affect normal/bright    Bilateral Wrist and Hand exam    Inspection:       Swelling: right 2nd and 3rd MCP joints    Tender:       Right 2nd and 3rd MCP joints    Non Tender:       Remainder of the Wrist and Hand bilateral    ROM:       Full and symmetric active and passive range of motion of the forearm, wrist and digits bilateral    Strength:       5/5 strength in the muscles of the hand, wrist and forearm bilateral    Neurovascular:       2+ radial pulses bilaterally with brisk capillary refill and      normal sensation to light touch in the radial, median and ulnar nerve distributions      Radiology  I visualized and reviewed these images with the patient  Xr Hand Right G/e 3 Views  Result Date: 11/20/2018  RIGHT HAND THREE VIEWS   11/20/2018 4:19 PM HISTORY: Right hand pain. COMPARISON: None.   IMPRESSION: Normal. KIMBERLEY TIWARI MD    Labs:  Component      Latest Ref Rng & Units 11/20/2018   Uric Acid      3.5 - 7.2 mg/dL 6.4   Lyme Disease Antibodies Serum      0.00 - 0.89 0.01   TSH      0.40 - 4.00 mU/L 3.97       Assessment:  1. Swelling of right hand      Right MCP joint " swelling.  Discussed differential including inflammatory arthritis and musculoskeletal including tendon issues.  I recommend lab evaluation first step, would consider rheumatology referral, MRI or therapy pending clinical course.    Plan:  - Today's Plan of Care:  Lab work order    -We also discussed other future treatment options:  Referral to rheumatology, MRI of the hand or Rehab: Occupational Therapy    Follow Up: will call with lab results    Concerning signs and symptoms were reviewed.  The patient expressed understanding of this management plan and all questions were answered at this time.    Thanks for the opportunity to participate in the care of this patient, I will keep you updated on their progress.    CC: Hema Shirley MD CAQ  Primary Care Sports Medicine  [facility]    Again, thank you for allowing me to participate in the care of your patient.        Sincerely,        Cinda Shirley MD

## 2018-12-05 NOTE — PATIENT INSTRUCTIONS
Plan:  - Today's Plan of Care:  Lab work order    -We also discussed other future treatment options:  Referral to rheumatology, MRI of the hand or Rehab: Occupational Therapy    Follow Up: will call with lab results    If you have any further questions for your physician or physician s care team you can call [phone] and use option 3 to leave a voice message. Calls received during business hours will be returned same day.

## 2018-12-06 DIAGNOSIS — M79.89 SWELLING OF RIGHT HAND: ICD-10-CM

## 2018-12-06 LAB
CRP SERPL-MCNC: 4.6 MG/L (ref 0–8)
ERYTHROCYTE [SEDIMENTATION RATE] IN BLOOD BY WESTERGREN METHOD: 8 MM/H (ref 0–20)

## 2018-12-06 PROCEDURE — 86140 C-REACTIVE PROTEIN: CPT | Performed by: PEDIATRICS

## 2018-12-06 PROCEDURE — 36415 COLL VENOUS BLD VENIPUNCTURE: CPT | Performed by: PEDIATRICS

## 2018-12-06 PROCEDURE — 86431 RHEUMATOID FACTOR QUANT: CPT | Performed by: PEDIATRICS

## 2018-12-06 PROCEDURE — 86038 ANTINUCLEAR ANTIBODIES: CPT | Performed by: PEDIATRICS

## 2018-12-06 PROCEDURE — 85652 RBC SED RATE AUTOMATED: CPT | Performed by: PEDIATRICS

## 2018-12-06 PROCEDURE — 86200 CCP ANTIBODY: CPT | Performed by: PEDIATRICS

## 2018-12-07 LAB
ANA SER QL IF: NEGATIVE
CCP AB SER IA-ACNC: 1 U/ML

## 2018-12-08 LAB — RHEUMATOID FACT SER NEPH-ACNC: <20 IU/ML (ref 0–20)

## 2018-12-12 ENCOUNTER — TELEPHONE (OUTPATIENT)
Dept: ORTHOPEDICS | Facility: CLINIC | Age: 53
End: 2018-12-12

## 2018-12-12 ENCOUNTER — MYC MEDICAL ADVICE (OUTPATIENT)
Dept: FAMILY MEDICINE | Facility: CLINIC | Age: 53
End: 2018-12-12

## 2018-12-12 DIAGNOSIS — M79.89 SWELLING OF RIGHT HAND: Primary | ICD-10-CM

## 2018-12-12 NOTE — TELEPHONE ENCOUNTER
LVM to discuss Lab results and Recommendations. Sent information in a my chart message as well. Patient was instructed to reach out with further questions.  Serafin Kendall ATC

## 2018-12-12 NOTE — TELEPHONE ENCOUNTER
Please call patient with lab results:    Component      Latest Ref Rng & Units 12/6/2018   Sed Rate      0 - 20 mm/h 8   CRP Inflammation      0.0 - 8.0 mg/L 4.6   Rheumatoid Factor      <20 IU/mL <20   Cyclic Citrullinated Peptide Antibody, IgG      <7 U/mL 1   ADAM interpretation      NEG:Negative Negative       In Summary:  - All labs were unremarkable, inflammatory etiology of joint swelling is less likely    I Recommend:  - Occupational Hand therapy (please place order) and follow up with me in 6 weeks  - Would consider further testing (MRI) at that point if symptoms haven't resolved    Cinda Shirley MD

## 2018-12-13 ENCOUNTER — MYC MEDICAL ADVICE (OUTPATIENT)
Dept: ORTHOPEDICS | Facility: CLINIC | Age: 53
End: 2018-12-13

## 2018-12-13 DIAGNOSIS — M25.441 SWELLING OF JOINT OF RIGHT HAND: Primary | ICD-10-CM

## 2018-12-13 NOTE — TELEPHONE ENCOUNTER
Patient's spouse contacted clinic to discuss Emanuel's right hand swelling.  She reports that he is very frustrated with course of care at this point and does not wish to pursue hand therapy.  [place]works ~ [place] minutes north of [place] and cannot travel that distance for therapy d/t his job commitments.  He wishing to pursue MRI prior to therapy.  Advised that would message Dr Shirley's team and have either Dr Shirley or her direct assistant contact Emanuel directly.      Ciro Land, ATC

## 2018-12-13 NOTE — TELEPHONE ENCOUNTER
Sent MyChart response back to pt. He has not personally seen Dr. Weaver since 10/30/17.    Roxie LAWRENCE RN

## 2018-12-17 ENCOUNTER — HOSPITAL ENCOUNTER (OUTPATIENT)
Dept: MRI IMAGING | Facility: CLINIC | Age: 53
Discharge: HOME | End: 2018-12-17
Attending: PEDIATRICS | Admitting: PEDIATRICS
Payer: COMMERCIAL

## 2018-12-17 DIAGNOSIS — M79.89 SWELLING OF RIGHT HAND: ICD-10-CM

## 2018-12-17 PROCEDURE — 73221 MRI JOINT UPR EXTREM W/O DYE: CPT | Mod: RT

## 2018-12-18 ENCOUNTER — MYC MEDICAL ADVICE (OUTPATIENT)
Dept: FAMILY MEDICINE | Facility: CLINIC | Age: 53
End: 2018-12-18

## 2018-12-18 NOTE — TELEPHONE ENCOUNTER
11-20-18 OV, Dr. Weaver.  Forwarded to MD for review, recommendations (please see 12-12-18 MyChart message).  AVERY Williamson RN

## 2018-12-19 NOTE — TELEPHONE ENCOUNTER
MR HAND RIGHT WITHOUT CONTRAST December 17, 2018 5:32 PM      HISTORY: Hand pain, chronic, rheumatoid arthritis suspected.  Metacarpophalangeal joint swelling. Swelling of right hand.     TECHNIQUE: Coronal T1, gradient echo, and inversion recovery, sagittal  T1, and transverse T1 and fat-suppressed T2-weighted pulse sequence.  Subcutaneous edema is noted dorsally over the second  metacarpophalangeal joint and second/third webspace. This appears to  track proximally into the dorsum of the hand. No discrete fluid  collection, cyst, or mass is demonstrated. Extensor and flexor tendons  appear to be grossly intact. Small nonspecific cysts are noted in the  ulnar aspect of the third metacarpal head and radial aspect of the  fourth metacarpal head. No metacarpophalangeal joint effusion or  synovitis is demonstrated. Small foci of increased signal intensity  are noted in multiple carpal bones on the proximal edge of the scan.  No midcarpal joint effusion is visible.                                                                      IMPRESSION:  1. Third and fourth metacarpal head small nonspecific cysts. No other  evidence of metacarpophalangeal joint effusion, synovitis, or erosion.  2. Small foci of carpal increased signal intensity which may represent  marrow edema, or developing cysts or erosions. No midcarpal joint  effusion is seen.  3. Dorsal subcutaneous edema as above. This could be related to soft  tissue contusion but is indeterminate in etiology.     MCKINLEY ABBOTT MD

## 2018-12-20 NOTE — TELEPHONE ENCOUNTER
Please call patient, am reviewing MRI with Rheumatology.  Will contact him after discussing.    Cinda Shirley MD

## 2018-12-20 NOTE — TELEPHONE ENCOUNTER
Please call patient with MRI results:    MR HAND RIGHT WITHOUT CONTRAST December 17, 2018 5:32 PM      HISTORY: Hand pain, chronic, rheumatoid arthritis suspected.  Metacarpophalangeal joint swelling. Swelling of right hand.     TECHNIQUE: Coronal T1, gradient echo, and inversion recovery, sagittal  T1, and transverse T1 and fat-suppressed T2-weighted pulse sequence.  Subcutaneous edema is noted dorsally over the second  metacarpophalangeal joint and second/third webspace. This appears to  track proximally into the dorsum of the hand. No discrete fluid  collection, cyst, or mass is demonstrated. Extensor and flexor tendons  appear to be grossly intact. Small nonspecific cysts are noted in the  ulnar aspect of the third metacarpal head and radial aspect of the  fourth metacarpal head. No metacarpophalangeal joint effusion or  synovitis is demonstrated. Small foci of increased signal intensity  are noted in multiple carpal bones on the proximal edge of the scan.  No midcarpal joint effusion is visible.                                                 IMPRESSION:  1. Third and fourth metacarpal head small nonspecific cysts. No other  evidence of metacarpophalangeal joint effusion, synovitis, or erosion.  2. Small foci of carpal increased signal intensity which may represent  marrow edema, or developing cysts or erosions. No midcarpal joint  effusion is seen.  3. Dorsal subcutaneous edema as above. This could be related to soft  tissue contusion but is indeterminate in etiology.    In Summary:  - 3rd and 4th metacarpal head nonspecific cysts with dorsal subcutaneous fluid    I Recommend:  - Differential includes inflammatory or osteo arthritis, however, labs are unremarkable.  - Options for further treatment include referral to hand surgeon or follow up in clinic for repeat exam and discussion of results and further possible treatment    Cinda Shirley MD

## 2018-12-20 NOTE — TELEPHONE ENCOUNTER
Spoke to patient and reviewed results and recommendations. Patient would like to know what other further possible treatment options Dr. Shirley had in mind. Would like a MyChart message with her additional thoughts before deciding on surgical follow-up or with Dr. Shirley.    Thaddeus Hooker, ATC

## 2018-12-20 NOTE — TELEPHONE ENCOUNTER
Sent reply to patient via MyChart as requested by patient per our earlier conversation.    Thaddeus Hooker, ATC

## 2019-01-02 NOTE — TELEPHONE ENCOUNTER
Would be easier to review options over the phone.  Please ask patient for 2-3 times he'll be available over the phone today, Friday and Monday - will try to reach him during those time.    Cinda Shirley MD

## 2019-01-07 ENCOUNTER — TELEPHONE (OUTPATIENT)
Dept: ORTHOPEDICS | Facility: CLINIC | Age: 54
End: 2019-01-07

## 2019-01-07 RX ORDER — PREDNISONE 20 MG/1
20 TABLET ORAL DAILY
Qty: 5 TABLET | Refills: 0 | Status: SHIPPED | OUTPATIENT
Start: 2019-01-07 | End: 2019-01-12

## 2019-01-07 NOTE — TELEPHONE ENCOUNTER
Reviewed with Rheumatology.  MCP joint swelling suspicious for Inflammatory arthritis despite negative screening labs.    Called patient to discuss.  - Rheumatology referral placed, patient instructed in how to schedule (could consider [facility] or [facility] as well)  - Prednisone 5 day burst sent to pharmacy.  (I reviewed the mechanism of action as well as risk/benefit profile of medications. Questions answered.)  - Patient could consider OT for symptom management, will contact us if desiring referral    Concerning signs and symptoms were reviewed.  The patient expressed understanding of this management plan and all questions were answered at this time.    Cinda Shirley MD CAQ  Primary Care Sports Medicine  [facility]

## 2019-01-09 ENCOUNTER — MYC MEDICAL ADVICE (OUTPATIENT)
Dept: ORTHOPEDICS | Facility: CLINIC | Age: 54
End: 2019-01-09

## 2019-01-09 ENCOUNTER — TELEPHONE (OUTPATIENT)
Dept: RHEUMATOLOGY | Facility: CLINIC | Age: 54
End: 2019-01-09

## 2019-01-09 NOTE — TELEPHONE ENCOUNTER
[facility] Call Center    Phone Message    May a detailed message be left on voicemail: yes    Reason for Call: Other: Pt's wife Magalys calling, wanting to know if pt could be seen in [facility]. Pt is scheduled with Dr. Mcgraw in May, but they were hoping for a sooner appt. The pt has no outside records or previous hx seeing rheumatology. There is a referral in his chart. Writer explained the new pt process and that staff could contact pt in 2-3 weeks for an intake. Writer did note to Magalys that the next new pt date is unknown to the call center, and that a sooner appt was not guaranteed. Magalys expressed understanding and stated she wanted to try anyway. Please f/u with pt.    Action Taken: Message routed to:  [facility] Rheumatology

## 2019-01-11 ENCOUNTER — MYC MEDICAL ADVICE (OUTPATIENT)
Dept: ORTHOPEDICS | Facility: CLINIC | Age: 54
End: 2019-01-11

## 2019-01-11 NOTE — TELEPHONE ENCOUNTER
Form completed and faxed to [employer]. Confirmed sent by [employer] on 1/11/2019 at 2:35pm ([id number]).    Tran Soto M.Ed., LAT, ATC

## 2019-01-11 NOTE — TELEPHONE ENCOUNTER
Results should be available on [facility], however, would have patient obtain disc of MRI along with report and lab results from medical records to be prepared for his appointment.    Cinda Shirley MD

## 2019-02-12 NOTE — TELEPHONE ENCOUNTER
Explained to patient that we do not have anything sooner and advised patient that it would be best to keep the appointment scheduled with Dr. Mcgraw. However, should he need our services in the future, to give us a call.   Corie Tran CMA  2/12/2019 4:02 PM

## 2019-02-14 ENCOUNTER — TRANSFERRED RECORDS (OUTPATIENT)
Dept: HEALTH INFORMATION MANAGEMENT | Facility: CLINIC | Age: 54
End: 2019-02-14

## 2019-03-28 ENCOUNTER — MEDICAL CORRESPONDENCE (OUTPATIENT)
Dept: HEALTH INFORMATION MANAGEMENT | Facility: CLINIC | Age: 54
End: 2019-03-28

## 2019-03-29 DIAGNOSIS — Z51.81 ENCOUNTER FOR THERAPEUTIC DRUG MONITORING: Primary | ICD-10-CM

## 2019-04-29 DIAGNOSIS — Z51.81 ENCOUNTER FOR THERAPEUTIC DRUG MONITORING: ICD-10-CM

## 2019-04-29 LAB
ALT SERPL W P-5'-P-CCNC: 27 U/L (ref 0–70)
CREAT SERPL-MCNC: 0.98 MG/DL (ref 0.66–1.25)
ERYTHROCYTE [DISTWIDTH] IN BLOOD BY AUTOMATED COUNT: 12.8 % (ref 10–15)
GFR SERPL CREATININE-BSD FRML MDRD: 89 ML/MIN/{1.73_M2}
HCT VFR BLD AUTO: 39.2 % (ref 40–53)
HGB BLD-MCNC: 13.4 G/DL (ref 13.3–17.7)
MCH RBC QN AUTO: 31.1 PG (ref 26.5–33)
MCHC RBC AUTO-ENTMCNC: 34.2 G/DL (ref 31.5–36.5)
MCV RBC AUTO: 91 FL (ref 78–100)
PLATELET # BLD AUTO: 264 10E9/L (ref 150–450)
RBC # BLD AUTO: 4.31 10E12/L (ref 4.4–5.9)
WBC # BLD AUTO: 7.8 10E9/L (ref 4–11)

## 2019-04-29 PROCEDURE — 36415 COLL VENOUS BLD VENIPUNCTURE: CPT | Performed by: INTERNAL MEDICINE

## 2019-04-29 PROCEDURE — 84460 ALANINE AMINO (ALT) (SGPT): CPT | Performed by: INTERNAL MEDICINE

## 2019-04-29 PROCEDURE — 82565 ASSAY OF CREATININE: CPT | Performed by: INTERNAL MEDICINE

## 2019-04-29 PROCEDURE — 85027 COMPLETE CBC AUTOMATED: CPT | Performed by: INTERNAL MEDICINE

## 2019-05-30 DIAGNOSIS — Z51.81 ENCOUNTER FOR THERAPEUTIC DRUG MONITORING: ICD-10-CM

## 2019-05-30 LAB
ALT SERPL W P-5'-P-CCNC: 30 U/L (ref 0–70)
CREAT SERPL-MCNC: 1.05 MG/DL (ref 0.66–1.25)
ERYTHROCYTE [DISTWIDTH] IN BLOOD BY AUTOMATED COUNT: 13.5 % (ref 10–15)
GFR SERPL CREATININE-BSD FRML MDRD: 81 ML/MIN/{1.73_M2}
HCT VFR BLD AUTO: 39.4 % (ref 40–53)
HGB BLD-MCNC: 13.1 G/DL (ref 13.3–17.7)
MCH RBC QN AUTO: 30.6 PG (ref 26.5–33)
MCHC RBC AUTO-ENTMCNC: 33.2 G/DL (ref 31.5–36.5)
MCV RBC AUTO: 92 FL (ref 78–100)
PLATELET # BLD AUTO: 272 10E9/L (ref 150–450)
RBC # BLD AUTO: 4.28 10E12/L (ref 4.4–5.9)
WBC # BLD AUTO: 7.2 10E9/L (ref 4–11)

## 2019-05-30 PROCEDURE — 84460 ALANINE AMINO (ALT) (SGPT): CPT | Performed by: INTERNAL MEDICINE

## 2019-05-30 PROCEDURE — 36415 COLL VENOUS BLD VENIPUNCTURE: CPT | Performed by: INTERNAL MEDICINE

## 2019-05-30 PROCEDURE — 82565 ASSAY OF CREATININE: CPT | Performed by: INTERNAL MEDICINE

## 2019-05-30 PROCEDURE — 85027 COMPLETE CBC AUTOMATED: CPT | Performed by: INTERNAL MEDICINE

## 2019-07-29 DIAGNOSIS — G47.33 OBSTRUCTIVE SLEEP APNEA: Primary | ICD-10-CM

## 2019-09-23 ENCOUNTER — MYC MEDICAL ADVICE (OUTPATIENT)
Dept: UROLOGY | Facility: CLINIC | Age: 54
End: 2019-09-23

## 2019-09-23 ENCOUNTER — DOCUMENTATION ONLY (OUTPATIENT)
Dept: LAB | Facility: CLINIC | Age: 54
End: 2019-09-23

## 2019-09-23 DIAGNOSIS — Z12.5 SCREENING FOR PROSTATE CANCER: Primary | ICD-10-CM

## 2019-09-23 DIAGNOSIS — Z13.220 SCREENING FOR HYPERLIPIDEMIA: Primary | ICD-10-CM

## 2019-09-23 NOTE — PROGRESS NOTES
Please place or confirm orders for upcoming lab appointment on 09/24/2019 Patient coming in for lab only appointment.    Thank You  Kenisha STARKS CMA.

## 2019-09-24 DIAGNOSIS — Z12.5 SCREENING FOR PROSTATE CANCER: ICD-10-CM

## 2019-09-24 PROCEDURE — G0103 PSA SCREENING: HCPCS | Performed by: UROLOGY

## 2019-09-24 PROCEDURE — 36415 COLL VENOUS BLD VENIPUNCTURE: CPT | Performed by: UROLOGY

## 2019-09-25 LAB — PSA SERPL-ACNC: 0.4 UG/L (ref 0–4)

## 2019-09-30 ENCOUNTER — OFFICE VISIT (OUTPATIENT)
Dept: UROLOGY | Facility: CLINIC | Age: 54
End: 2019-09-30
Payer: COMMERCIAL

## 2019-09-30 ENCOUNTER — OFFICE VISIT (OUTPATIENT)
Dept: DERMATOLOGY | Facility: CLINIC | Age: 54
End: 2019-09-30
Payer: COMMERCIAL

## 2019-09-30 VITALS
DIASTOLIC BLOOD PRESSURE: 73 MMHG | RESPIRATION RATE: 16 BRPM | SYSTOLIC BLOOD PRESSURE: 126 MMHG | HEART RATE: 40 BPM | TEMPERATURE: 97.5 F

## 2019-09-30 VITALS — SYSTOLIC BLOOD PRESSURE: 120 MMHG | OXYGEN SATURATION: 97 % | DIASTOLIC BLOOD PRESSURE: 77 MMHG | HEART RATE: 43 BPM

## 2019-09-30 DIAGNOSIS — L82.1 SEBORRHEIC KERATOSIS: Primary | ICD-10-CM

## 2019-09-30 DIAGNOSIS — L81.4 LENTIGO: ICD-10-CM

## 2019-09-30 DIAGNOSIS — N40.0 BENIGN PROSTATIC HYPERPLASIA WITHOUT LOWER URINARY TRACT SYMPTOMS: Primary | ICD-10-CM

## 2019-09-30 DIAGNOSIS — L57.0 AK (ACTINIC KERATOSIS): ICD-10-CM

## 2019-09-30 PROCEDURE — 99203 OFFICE O/P NEW LOW 30 MIN: CPT | Performed by: DERMATOLOGY

## 2019-09-30 PROCEDURE — 99213 OFFICE O/P EST LOW 20 MIN: CPT | Performed by: UROLOGY

## 2019-09-30 RX ORDER — SULFASALAZINE 500 MG/1
TABLET ORAL
Refills: 2 | COMMUNITY
Start: 2019-09-11 | End: 2021-05-24

## 2019-09-30 RX ORDER — FLUOROURACIL 50 MG/G
CREAM TOPICAL
Qty: 40 G | Refills: 3 | Status: SHIPPED | OUTPATIENT
Start: 2019-09-30 | End: 2021-05-24

## 2019-09-30 NOTE — PATIENT INSTRUCTIONS
Per physician instructions.    If you have questions or concerns on any instructions given to you by your provider today or if you need to schedule an appointment, you can reach us at [phone].    Thank you!

## 2019-09-30 NOTE — PROGRESS NOTES
Appointment source: Established Patient  Patient name: Emanuel Landeros  Urology Staff: Diogenes Eisenberg MD    Subjective: This is a 54 year old year old male returning for follow up of strong prostate cancer family history.    Previously also treated for hypogonadism with supplemental testosterone which has now stopped.    No voiding complaints.    Objective:  Rectal examination benign.    PSA 0.4 ng/mL    Assessment:  Normal voiding, PSA and prostate examination.    Plan:  Repeat PSA in one year.    Total time 20 minutes, counseling 15 minutes discussing family history of prostate cancer.

## 2019-09-30 NOTE — NURSING NOTE
"Chief Complaint   Patient presents with     Results     PSA        Initial /73 (BP Location: Right arm, Patient Position: Chair, Cuff Size: Adult Regular)   Pulse (!) 40   Temp 97.5  F (36.4  C) (Tympanic)   Resp 16  Estimated body mass index is 31.93 kg/m  as calculated from the following:    Height as of 12/5/18: 1.727 m (5' 8\").    Weight as of 12/5/18: 95.3 kg (210 lb).  BP completed using cuff size: regular   Medications and allergies reviewed.      Betty FRANKLIN CMA       "

## 2019-09-30 NOTE — NURSING NOTE
"Initial /77 (BP Location: Left arm, Patient Position: Sitting, Cuff Size: Adult Large)   Pulse (!) 43   SpO2 97%  Estimated body mass index is 31.93 kg/m  as calculated from the following:    Height as of 12/5/18: 1.727 m (5' 8\").    Weight as of 12/5/18: 95.3 kg (210 lb). .      "

## 2019-09-30 NOTE — PROGRESS NOTES
Emanuel Landeros is a 54 year old year old male patient here today for rough spot on right cheek.   .  Patient states this has been present for a while.  Patient reports the following symptoms:  Scale and rough.  Patient reports the following previous treatments none.  Patient reports the following modifying factors none.  Associated symptoms: none.  Patient has no other skin complaints today.  Remainder of the HPI, Meds, PMH, Allergies, FH, and SH was reviewed in chart.    History reviewed. No pertinent past medical history.    Past Surgical History:   Procedure Laterality Date     C6-C7 fusion  2006     [facility]      EXCISE MASS ABDOMEN Left 2018    Procedure: EXCISE MASS ABDOMEN;  Excision soft tissue mass left abdomen;  Surgeon: Froylan Ayala MD;  Location: [facility] OR     ORTHOPEDIC SURGERY       SOFT TISSUE SURGERY          Family History   Problem Relation Age of Onset     Prostate Cancer Father      Prostate Cancer Paternal Grandfather      Melanoma No family hx of        Social History     Socioeconomic History     Marital status:      Spouse name: Not on file     Number of children: Not on file     Years of education: Not on file     Highest education level: Not on file   Occupational History     Not on file   Social Needs     Financial resource strain: Not on file     Food insecurity:     Worry: Not on file     Inability: Not on file     Transportation needs:     Medical: Not on file     Non-medical: Not on file   Tobacco Use     Smoking status: Former Smoker     Packs/day: 0.00     Last attempt to quit: 2003     Years since quittin.2     Smokeless tobacco: Never Used   Substance and Sexual Activity     Alcohol use: Yes     Comment: minimal     Drug use: No     Comment: daniella     Sexual activity: Yes     Partners: Female   Lifestyle     Physical activity:     Days per week: Not on file     Minutes per session: Not on file     Stress: Not on file   Relationships     Social  connections:     Talks on phone: Not on file     Gets together: Not on file     Attends Yarsani service: Not on file     Active member of club or organization: Not on file     Attends meetings of clubs or organizations: Not on file     Relationship status: Not on file     Intimate partner violence:     Fear of current or ex partner: Not on file     Emotionally abused: Not on file     Physically abused: Not on file     Forced sexual activity: Not on file   Other Topics Concern     Parent/sibling w/ CABG, MI or angioplasty before 65F 55M? Not Asked   Social History Narrative     Not on file       Outpatient Encounter Medications as of 9/30/2019   Medication Sig Dispense Refill     naproxen (NAPROSYN) 500 MG tablet Take 1 tablet (500 mg) by mouth 2 times daily as needed for moderate pain 30 tablet 1     sulfaSALAzine (AZULFIDINE) 500 MG tablet TAKE 2 TABLETS BY MOUTH TWICE DAILY AFTER FOOD  2     fluticasone (FLONASE) 50 MCG/ACT nasal spray Spray 1 spray into both nostrils daily (Patient not taking: Reported on 9/30/2019) 16 g 3     ORDER FOR DME CPAP:  8 cm H2O    Lifetime need and heated humidity.      (Patient not taking: Reported on 8/13/2018) 1 each      ZYRTEC PO OTC AS NEEDED       No facility-administered encounter medications on file as of 9/30/2019.              Review Of Systems  Skin: As above  Eyes: negative  Ears/Nose/Throat: negative  Respiratory: No shortness of breath, dyspnea on exertion, cough, or hemoptysis  Cardiovascular: negative  Gastrointestinal: negative  Genitourinary: negative  Musculoskeletal: negative  Neurologic: negative  Psychiatric: negative  Hematologic/Lymphatic/Immunologic: negative  Endocrine: negative      O:   NAD, WDWN, Alert & Oriented, Mood & Affect wnl, Vitals stable   Here today alone   /77 (BP Location: Left arm, Patient Position: Sitting, Cuff Size: Adult Large)   Pulse (!) 43   SpO2 97%    General appearance normal   Vitals stable   Alert, oriented and in no  acute distress        Stuck on papules and brown macules on trunk and ext   R cheek gritty scaly papule      The remainder of expanded problem focused exam was unremarkable; the following areas were examined:  scalp/hair, conjunctiva/lids, face, neck, lips      Eyes: Conjunctivae/lids:Normal     ENT: Lips, buccal mucosa, tongue: normal    MSK:Normal    Cardiovascular: peripheral edema none    Pulm: Breathing Normal    Neuro/Psych: Orientation:Normal; Mood/Affect:Normal      A/P:  1. Seborrheic keratosis, lentigo,   2. Favor actinic keratosis   Pathophysiology discussed with pateint   Cryo and efudex discussed with patient   Efudex twice daily 2 weeks  Irritation discussed with patient     BENIGN LESIONS DISCUSSED WITH PATIENT:  I discussed the specifics of tumor, prognosis, and genetics of benign lesions.  I explained that treatment of these lesions would be purely cosmetic and not medically neccessary.  I discussed with patient different removal options including excision, cautery and /or laser.      Nature and genetics of benign skin lesions dicussed with patient.  Signs and Symptoms of skin cancer discussed with patient.  Patient encouraged to perform monthly skin exams.  UV precautions reviewed with patient.  Skin care regimen reviewed with patient: Eliminate harsh soaps, i.e. Dial, zest, irsih spring; Mild soaps such as Cetaphil or Dove sensitive skin, avoid hot or cold showers, aggressive use of emollients including vanicream, cetaphil or cerave discussed with patient.    Risks of non-melanoma skin cancer discussed with patient   Return to clinic 3 months

## 2019-09-30 NOTE — LETTER
2019         RE: Emanuel Landeros  [address]  [facility] [address]        Dear Colleague,    Thank you for referring your patient, Emanuel Landeros, to the [facility]. Please see a copy of my visit note below.    Emanuel Landeros is a 54 year old year old male patient here today for rough spot on right cheek.   .  Patient states this has been present for a while.  Patient reports the following symptoms:  Scale and rough.  Patient reports the following previous treatments none.  Patient reports the following modifying factors none.  Associated symptoms: none.  Patient has no other skin complaints today.  Remainder of the HPI, Meds, PMH, Allergies, FH, and SH was reviewed in chart.    History reviewed. No pertinent past medical history.    Past Surgical History:   Procedure Laterality Date     C6-C7 fusion  2006     [facility]      EXCISE MASS ABDOMEN Left 2018    Procedure: EXCISE MASS ABDOMEN;  Excision soft tissue mass left abdomen;  Surgeon: Froylan Ayala MD;  Location: [facility] OR     ORTHOPEDIC SURGERY       SOFT TISSUE SURGERY          Family History   Problem Relation Age of Onset     Prostate Cancer Father      Prostate Cancer Paternal Grandfather      Melanoma No family hx of        Social History     Socioeconomic History     Marital status:      Spouse name: Not on file     Number of children: Not on file     Years of education: Not on file     Highest education level: Not on file   Occupational History     Not on file   Social Needs     Financial resource strain: Not on file     Food insecurity:     Worry: Not on file     Inability: Not on file     Transportation needs:     Medical: Not on file     Non-medical: Not on file   Tobacco Use     Smoking status: Former Smoker     Packs/day: 0.00     Last attempt to quit: 2003     Years since quittin.2     Smokeless tobacco: Never Used   Substance and Sexual Activity     Alcohol use: Yes     Comment: minimal      Drug use: No     Comment: daniella     Sexual activity: Yes     Partners: Female   Lifestyle     Physical activity:     Days per week: Not on file     Minutes per session: Not on file     Stress: Not on file   Relationships     Social connections:     Talks on phone: Not on file     Gets together: Not on file     Attends [employer] service: Not on file     Active member of club or organization: Not on file     Attends meetings of clubs or organizations: Not on file     Relationship status: Not on file     Intimate partner violence:     Fear of current or ex partner: Not on file     Emotionally abused: Not on file     Physically abused: Not on file     Forced sexual activity: Not on file   Other Topics Concern     Parent/sibling w/ CABG, MI or angioplasty before 65F 55M? Not Asked   Social History Narrative     Not on file       Outpatient Encounter Medications as of 9/30/2019   Medication Sig Dispense Refill     naproxen (NAPROSYN) 500 MG tablet Take 1 tablet (500 mg) by mouth 2 times daily as needed for moderate pain 30 tablet 1     sulfaSALAzine (AZULFIDINE) 500 MG tablet TAKE 2 TABLETS BY MOUTH TWICE DAILY AFTER FOOD  2     fluticasone (FLONASE) 50 MCG/ACT nasal spray Spray 1 spray into both nostrils daily (Patient not taking: Reported on 9/30/2019) 16 g 3     ORDER FOR DME CPAP:  8 cm H2O    Lifetime need and heated humidity.      (Patient not taking: Reported on 8/13/2018) 1 each      ZYRTEC PO OTC AS NEEDED       No facility-administered encounter medications on file as of 9/30/2019.              Review Of Systems  Skin: As above  Eyes: negative  Ears/Nose/Throat: negative  Respiratory: No shortness of breath, dyspnea on exertion, cough, or hemoptysis  Cardiovascular: negative  Gastrointestinal: negative  Genitourinary: negative  Musculoskeletal: negative  Neurologic: negative  Psychiatric: negative  Hematologic/Lymphatic/Immunologic: negative  Endocrine: negative      O:   NAD, WDWN, Alert & Oriented,  Mood & Affect wnl, Vitals stable   Here today alone   /77 (BP Location: Left arm, Patient Position: Sitting, Cuff Size: Adult Large)   Pulse (!) 43   SpO2 97%    General appearance normal   Vitals stable   Alert, oriented and in no acute distress        Stuck on papules and brown macules on trunk and ext   R cheek gritty scaly papule      The remainder of expanded problem focused exam was unremarkable; the following areas were examined:  scalp/hair, conjunctiva/lids, face, neck, lips      Eyes: Conjunctivae/lids:Normal     ENT: Lips, buccal mucosa, tongue: normal    MSK:Normal    Cardiovascular: peripheral edema none    Pulm: Breathing Normal    Neuro/Psych: Orientation:Normal; Mood/Affect:Normal      A/P:  1. Seborrheic keratosis, lentigo,   2. Favor actinic keratosis   Pathophysiology discussed with pateint   Cryo and efudex discussed with patient   Efudex twice daily 2 weeks  Irritation discussed with patient     BENIGN LESIONS DISCUSSED WITH PATIENT:  I discussed the specifics of tumor, prognosis, and genetics of benign lesions.  I explained that treatment of these lesions would be purely cosmetic and not medically neccessary.  I discussed with patient different removal options including excision, cautery and /or laser.      Nature and genetics of benign skin lesions dicussed with patient.  Signs and Symptoms of skin cancer discussed with patient.  Patient encouraged to perform monthly skin exams.  UV precautions reviewed with patient.  Skin care regimen reviewed with patient: Eliminate harsh soaps, i.e. Dial, zest, irsih spring; Mild soaps such as Cetaphil or Dove sensitive skin, avoid hot or cold showers, aggressive use of emollients including vanicream, cetaphil or cerave discussed with patient.    Risks of non-melanoma skin cancer discussed with patient   Return to clinic 3 months      Again, thank you for allowing me to participate in the care of your patient.        Sincerely,        Pantera Haji  MD Susan

## 2019-10-02 ENCOUNTER — HEALTH MAINTENANCE LETTER (OUTPATIENT)
Age: 54
End: 2019-10-02

## 2019-10-30 ENCOUNTER — HEALTH MAINTENANCE LETTER (OUTPATIENT)
Age: 54
End: 2019-10-30

## 2019-12-30 ENCOUNTER — OFFICE VISIT (OUTPATIENT)
Dept: FAMILY MEDICINE | Facility: CLINIC | Age: 54
End: 2019-12-30
Payer: COMMERCIAL

## 2019-12-30 VITALS
SYSTOLIC BLOOD PRESSURE: 128 MMHG | HEART RATE: 64 BPM | RESPIRATION RATE: 18 BRPM | DIASTOLIC BLOOD PRESSURE: 84 MMHG | TEMPERATURE: 98.2 F | WEIGHT: 201 LBS | HEIGHT: 68 IN | BODY MASS INDEX: 30.46 KG/M2

## 2019-12-30 DIAGNOSIS — Z12.11 COLON CANCER SCREENING: ICD-10-CM

## 2019-12-30 DIAGNOSIS — L30.9 PERIANAL DERMATITIS: Primary | ICD-10-CM

## 2019-12-30 PROCEDURE — 99213 OFFICE O/P EST LOW 20 MIN: CPT | Performed by: FAMILY MEDICINE

## 2019-12-30 RX ORDER — TRIAMCINOLONE ACETONIDE 1 MG/G
CREAM TOPICAL DAILY PRN
Qty: 80 G | Refills: 1 | Status: SHIPPED | OUTPATIENT
Start: 2019-12-30 | End: 2021-05-24

## 2019-12-30 ASSESSMENT — MIFFLIN-ST. JEOR: SCORE: 1736.23

## 2019-12-30 NOTE — PROGRESS NOTES
SUBJECTIVE   Emanuel Landeros is a 54 year old male who presents with     Hemorrhoids ?      Duration: Couple months    Description:   Pain: No. Has a raised spot that wants checked  Itching: YES    Accompanying signs and symptoms:   Blood in stool: no   Changes in stool pattern: no     History (similar episodes/previous evaluation): None    Precipitating or alleviating factors: None    Therapies tried and outcome: just keeping clean- didn't really bother him enough to do anything        PCP   Hema Weaver [phone]    Health Maintenance        Health Maintenance Due   Topic Date Due     PREVENTIVE CARE VISIT  1967     HIV SCREENING  1982     ZOSTER IMMUNIZATION (1 of 2) 2017     INFLUENZA VACCINE (1) 2019       HPI        Patient Active Problem List   Diagnosis     Testosterone deficiency     TERESA (obstructive sleep apnea)     Cervical disc disorder with radiculopathy     Current Outpatient Medications   Medication     fluorouracil (EFUDEX) 5 % external cream     fluticasone (FLONASE) 50 MCG/ACT nasal spray     naproxen (NAPROSYN) 500 MG tablet     ORDER FOR DME     sulfaSALAzine (AZULFIDINE) 500 MG tablet     ZYRTEC PO     No current facility-administered medications for this visit.        Patient Active Problem List   Diagnosis     Testosterone deficiency     TERESA (obstructive sleep apnea)     Cervical disc disorder with radiculopathy     Past Surgical History:   Procedure Laterality Date     C6-C7 fusion  2006     [facility]      EXCISE MASS ABDOMEN Left 2018    Procedure: EXCISE MASS ABDOMEN;  Excision soft tissue mass left abdomen;  Surgeon: Froylan Ayala MD;  Location: [facility] OR     ORTHOPEDIC SURGERY       SOFT TISSUE SURGERY         Social History     Tobacco Use     Smoking status: Former Smoker     Packs/day: 0.00     Last attempt to quit: 2003     Years since quittin.5     Smokeless tobacco: Never Used   Substance Use Topics     Alcohol use: Yes     Comment:  "minimal     Family History   Problem Relation Age of Onset     Prostate Cancer Father      Prostate Cancer Paternal Grandfather      Melanoma No family hx of          Current Outpatient Medications   Medication Sig Dispense Refill     fluorouracil (EFUDEX) 5 % external cream Twice daily to right cheek for two weeks 40 g 3     fluticasone (FLONASE) 50 MCG/ACT nasal spray Spray 1 spray into both nostrils daily 16 g 3     naproxen (NAPROSYN) 500 MG tablet Take 1 tablet (500 mg) by mouth 2 times daily as needed for moderate pain 30 tablet 1     ORDER FOR DME CPAP:  8 cm H2O    Lifetime need and heated humidity.      1 each      sulfaSALAzine (AZULFIDINE) 500 MG tablet TAKE 2 TABLETS BY MOUTH TWICE DAILY AFTER FOOD  2     triamcinolone (KENALOG) 0.1 % external cream Apply topically daily as needed for irritation 80 g 1     ZYRTEC PO OTC AS NEEDED       No Known Allergies  Recent Labs   Lab Test 05/30/19  1550 04/29/19  1610 11/20/18  1620   ALT 30 27  --    CR 1.05 0.98  --    GFRESTIMATED 81 89  --    GFRESTBLACK >90 >90  --    TSH  --   --  3.97      BP Readings from Last 3 Encounters:   12/30/19 128/84   09/30/19 126/73   09/30/19 120/77    Wt Readings from Last 3 Encounters:   12/30/19 91.2 kg (201 lb)   12/05/18 95.3 kg (210 lb)   11/20/18 95.3 kg (210 lb)                    Reviewed and updated:  Tobacco  Allergies  Meds  Med Hx  Surg Hx  Fam Hx  Soc Hx     ROS:  Constitutional, HEENT, cardiovascular, pulmonary, gi and gu systems are negative, except as otherwise noted.    PHYSICAL EXAM   /84 (Cuff Size: Adult Regular)   Pulse 64   Temp 98.2  F (36.8  C) (Tympanic)   Resp 18   Ht 1.727 m (5' 8\")   Wt 91.2 kg (201 lb)   BMI 30.56 kg/m    Body mass index is 30.56 kg/m .  GENERAL: healthy, alert and no distress  NECK: no adenopathy, no asymmetry, masses, or scars and thyroid normal to palpation  RESP: lungs clear to auscultation - no rales, rhonchi or wheezes  CV: regular rate and rhythm, normal S1 " S2, no S3 or S4, no murmur, click or rub, no peripheral edema and peripheral pulses strong  ABDOMEN: soft, nontender, no hepatosplenomegaly, no masses and bowel sounds normal  MS: no gross musculoskeletal defects noted, no edema  : Erythematous rash involving perianal skin, no blistering or discharge, no bleeding or hemorrhoids noted      Assessment & Plan     (L30.9) Perianal dermatitis  (primary encounter diagnosis)  Comment: Symptoms are secondary to perianal dermatitis.  Kenalog prescribed, common side effect discussed.  Suggested to keep the area dry and clean.  Follow-up as needed  Plan: triamcinolone (KENALOG) 0.1 % external cream        (Z12.11) Colon cancer screening  Comment: Screening colonoscopy ordered as per current guidelines  Plan: GASTROENTEROLOGY ADULT REF PROCEDURE ONLY             Hema Weaver MD  [facility]

## 2019-12-30 NOTE — NURSING NOTE
"Chief Complaint   Patient presents with     Rectal Problem     /84 (Cuff Size: Adult Regular)   Pulse 64   Temp 98.2  F (36.8  C) (Tympanic)   Resp 18   Ht 1.727 m (5' 8\")   Wt 91.2 kg (201 lb)   BMI 30.56 kg/m   Estimated body mass index is 30.56 kg/m  as calculated from the following:    Height as of this encounter: 1.727 m (5' 8\").    Weight as of this encounter: 91.2 kg (201 lb).  Patient presents to the clinic using No DME      Health Maintenance that is potentially due pending provider review:    Health Maintenance Due   Topic Date Due     PREVENTIVE CARE VISIT  1967     HIV SCREENING  09/20/1982     LIPID  09/20/2002     ZOSTER IMMUNIZATION (1 of 2) 09/20/2017     PHQ-2  01/01/2019     INFLUENZA VACCINE (1) 09/01/2019                "

## 2020-01-29 ENCOUNTER — OFFICE VISIT (OUTPATIENT)
Dept: FAMILY MEDICINE | Facility: CLINIC | Age: 55
End: 2020-01-29
Payer: COMMERCIAL

## 2020-01-29 VITALS
HEART RATE: 48 BPM | SYSTOLIC BLOOD PRESSURE: 130 MMHG | RESPIRATION RATE: 12 BRPM | BODY MASS INDEX: 30.87 KG/M2 | OXYGEN SATURATION: 97 % | WEIGHT: 203 LBS | TEMPERATURE: 97.2 F | DIASTOLIC BLOOD PRESSURE: 84 MMHG

## 2020-01-29 DIAGNOSIS — K13.70 ORAL LESION: Primary | ICD-10-CM

## 2020-01-29 PROCEDURE — 99213 OFFICE O/P EST LOW 20 MIN: CPT | Performed by: FAMILY MEDICINE

## 2020-01-29 RX ORDER — TRIAMCINOLONE ACETONIDE 0.1 %
PASTE (GRAM) DENTAL 2 TIMES DAILY
Qty: 5 G | Refills: 0 | Status: SHIPPED | OUTPATIENT
Start: 2020-01-29 | End: 2020-02-05

## 2020-01-29 NOTE — NURSING NOTE
"Chief Complaint   Patient presents with     Mouth Lesions     Lump on top of mouth     /84   Pulse (!) 48   Temp 97.2  F (36.2  C) (Tympanic)   Resp 12   Wt 92.1 kg (203 lb)   SpO2 97%   BMI 30.87 kg/m   Estimated body mass index is 30.87 kg/m  as calculated from the following:    Height as of 12/30/19: 1.727 m (5' 8\").    Weight as of this encounter: 92.1 kg (203 lb).  Patient presents to the clinic using No DME      Health Maintenance that is potentially due pending provider review:    Health Maintenance Due   Topic Date Due     PREVENTIVE CARE VISIT  1967     HIV SCREENING  09/20/1982     ZOSTER IMMUNIZATION (1 of 2) 09/20/2017     INFLUENZA VACCINE (1) 09/01/2019     PHQ-2  01/01/2020        n/a        "

## 2020-01-29 NOTE — PROGRESS NOTES
triamSUBJECTIVE   Emanuel Landeros is a 54 year old male who presents with     Mouth      Duration: 1 week    Description (location/character/radiation): inside top of mouth    Intensity:  moderate    Accompanying signs and symptoms: itches, hasn't noticed any drainage or bad taste in mouth    History (similar episodes/previous evaluation): Mother had a lump in her mouth which ended up going to brain - cancer    Precipitating or alleviating factors: None    Therapies tried and outcome: None    Appetite is good and no history of weight loss or any abnormal gland swelling       PCP   Hema Weaver [phone]    Health Maintenance        Health Maintenance Due   Topic Date Due     PREVENTIVE CARE VISIT  1967     HIV SCREENING  09/20/1982     ZOSTER IMMUNIZATION (1 of 2) 09/20/2017     INFLUENZA VACCINE (1) 09/01/2019     PHQ-2  01/01/2020       HPI        Patient Active Problem List   Diagnosis     Testosterone deficiency     TERESA (obstructive sleep apnea)     Cervical disc disorder with radiculopathy     Current Outpatient Medications   Medication     fluorouracil (EFUDEX) 5 % external cream     fluticasone (FLONASE) 50 MCG/ACT nasal spray     naproxen (NAPROSYN) 500 MG tablet     ORDER FOR DME     sulfaSALAzine (AZULFIDINE) 500 MG tablet     triamcinolone (KENALOG) 0.1 % external cream     triamcinolone (KENALOG) 0.1 % paste     ZYRTEC PO     No current facility-administered medications for this visit.        Patient Active Problem List   Diagnosis     Testosterone deficiency     TERESA (obstructive sleep apnea)     Cervical disc disorder with radiculopathy     Past Surgical History:   Procedure Laterality Date     C6-C7 fusion  2006     [facility]      EXCISE MASS ABDOMEN Left 5/9/2018    Procedure: EXCISE MASS ABDOMEN;  Excision soft tissue mass left abdomen;  Surgeon: Froylan Ayala MD;  Location: [facility] OR     ORTHOPEDIC SURGERY       SOFT TISSUE SURGERY         Social History     Tobacco Use     Smoking  status: Former Smoker     Packs/day: 0.00     Last attempt to quit: 2003     Years since quittin.6     Smokeless tobacco: Never Used   Substance Use Topics     Alcohol use: Yes     Comment: minimal     Family History   Problem Relation Age of Onset     Prostate Cancer Father      Prostate Cancer Paternal Grandfather      Melanoma No family hx of          Current Outpatient Medications   Medication Sig Dispense Refill     fluorouracil (EFUDEX) 5 % external cream Twice daily to right cheek for two weeks 40 g 3     fluticasone (FLONASE) 50 MCG/ACT nasal spray Spray 1 spray into both nostrils daily 16 g 3     naproxen (NAPROSYN) 500 MG tablet Take 1 tablet (500 mg) by mouth 2 times daily as needed for moderate pain 30 tablet 1     ORDER FOR DME CPAP:  8 cm H2O    Lifetime need and heated humidity.      1 each      sulfaSALAzine (AZULFIDINE) 500 MG tablet TAKE 2 TABLETS BY MOUTH TWICE DAILY AFTER FOOD  2     triamcinolone (KENALOG) 0.1 % external cream Apply topically daily as needed for irritation 80 g 1     triamcinolone (KENALOG) 0.1 % paste Take by mouth 2 times daily for 7 days 5 g 0     ZYRTEC PO OTC AS NEEDED       No Known Allergies  Recent Labs   Lab Test 19  1550 19  1610 18  1620   ALT 30 27  --    CR 1.05 0.98  --    GFRESTIMATED 81 89  --    GFRESTBLACK >90 >90  --    TSH  --   --  3.97      BP Readings from Last 3 Encounters:   20 130/84   19 128/84   19 126/73    Wt Readings from Last 3 Encounters:   20 92.1 kg (203 lb)   19 91.2 kg (201 lb)   18 95.3 kg (210 lb)                    Reviewed and updated:  Tobacco  Allergies  Meds  Med Hx  Surg Hx  Fam Hx  Soc Hx     ROS:  Constitutional, HEENT, cardiovascular, pulmonary, gi and gu systems are negative, except as otherwise noted.    PHYSICAL EXAM   /84   Pulse (!) 48   Temp 97.2  F (36.2  C) (Tympanic)   Resp 12   Wt 92.1 kg (203 lb)   SpO2 97%   BMI 30.87 kg/m    Body mass  index is 30.87 kg/m .  GENERAL: healthy, alert and no distress  EYES: Eyes grossly normal to inspection, PERRL and conjunctivae and sclerae normal  HENT: normal cephalic/atraumatic, ear canals and TM's normal, oral mucous membranes moist and small erythematous lesion involving palatal mucosa, slightly tender on palpation without any drainage/bleeding  NECK: no adenopathy, no asymmetry, masses, or scars and thyroid normal to palpation  RESP: lungs clear to auscultation - no rales, rhonchi or wheezes  MS: no gross musculoskeletal defects noted, no edema  SKIN: no suspicious lesions or rashes      Assessment & Plan     (K13.70) Oral lesion  (primary encounter diagnosis)  Comment: Differentials discussed in detail, mother had oral cancer in her 60s.  Patient denies smoking cigarette.  Kenalog paste prescribed and suggested to follow-up with oral surgery for further evaluation.  Patient understood and in agreement with above plan.  All questions were.  Plan: ORAL SURGERY REFERRAL, triamcinolone (KENALOG)         0.1 % paste           Hema Weaver MD  [facility]

## 2020-10-17 ENCOUNTER — VIRTUAL VISIT (OUTPATIENT)
Dept: FAMILY MEDICINE | Facility: OTHER | Age: 55
End: 2020-10-17
Payer: COMMERCIAL

## 2020-10-17 PROCEDURE — 99421 OL DIG E/M SVC 5-10 MIN: CPT | Performed by: PHYSICIAN ASSISTANT

## 2020-10-17 NOTE — PROGRESS NOTES
"Date: 10/17/2020 02:59:04  Clinician: Fely Laguerre  Clinician NPI: [id number]  Patient: Emanuel Landeros  Patient : 1965  Patient Address: [place] [address]  Patient Phone: [phone]  Visit Protocol: URI  Patient Summary:  Emanuel is a 55 year old ( : 1965 ) male who initiated a [facility] Visit for COVID-19 (Coronavirus) evaluation and screening. When asked the question \"Please sign me up to receive news, health information and promotions from [facility].\", Emanuel responded \"No\".    Emanuel states his symptoms started 1-2 days ago.   His symptoms consist of a headache, a cough, nasal congestion, rhinitis, myalgia, malaise, and a sore throat. He is experiencing mild difficulty breathing with activities but can speak normally in full sentences. Emanuel also feels feverish.   Symptom details     Nasal secretions: The color of his mucus is green.    Cough: Emanuel coughs every 5-10 minutes and his cough is more bothersome at night. Phlegm comes into his throat when he coughs. He does not believe his cough is caused by post-nasal drip. The color of the phlegm is green.     Sore throat: Emanuel reports having mild throat pain (1-3 on a 10 point pain scale), does not have exudate on his tonsils, and can swallow liquids. He is not sure if the lymph nodes in his neck are enlarged. A rash has not appeared on the skin since the sore throat started.     Temperature: His current temperature is 100.1 degrees Fahrenheit. Emanuel has had a temperature over 100 degrees Fahrenheit for 1-2 days.     Headache: He states the headache is mild (1-3 on a 10 point pain scale).      Emanuel denies having ear pain, wheezing, anosmia, vomiting, nausea, facial pain or pressure, chills, teeth pain, ageusia, and diarrhea. He also denies having a sinus infection within the past year, taking antibiotic medication in the past month, and having recent facial or sinus surgery in the past 60 days.   Precipitating events  Within the past " week, Emanuel has not been exposed to someone with strep throat. He has not recently been exposed to someone with influenza. Emanuel has been in close contact with the following high risk individuals: adults 65 or older, people with asthma, heart disease or diabetes, and immunocompromised people.   Pertinent COVID-19 (Coronavirus) information  In the past 14 days, Emanuel has worked in a congregate living setting.   He does not work or volunteer as healthcare worker or a  and does not work or volunteer in a healthcare facility. 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 He does not live with a healthcare worker.   Emanuel has not had a close contact with a laboratory-confirmed COVID-19 patient within 14 days of symptom onset.   Since December 2019, Emanuel and has had upper respiratory infection (URI) or influenza-like illness. Has not been diagnosed with lab-confirmed COVID-19 test      Date(s) of previous URI or influenza-like illness (free-text): February 2020     Symptoms Emanuel experienced during previous URI or influenza-like illness as reported by the patient (free-text): Fever, cough        Pertinent medical history  Emanuel needs a return to work/school note.   Weight: 205 lbs   Emanuel does not smoke or use smokeless tobacco.   Weight: 205 lbs    MEDICATIONS: Zyrtec oral, Flonase Allergy Relief nasal, ALLERGIES: NKDA  Clinician Response:  Dear Emanuel,  Your health is our priority. Based on the information you have provided, it is possible that you may have some type of viral infection.  Please read the full treatment plan and see my recommendations below.  Medication information  Because you have a viral infection, antibiotics will not help you get better. Treating a viral infection with antibiotics could actually make you feel worse.  Self care  Steps you can take to be as comfortable as  possible:     Rest.    Drink plenty of fluids.    Take a warm shower to loosen congestion    Use a cool-mist humidifier.    Use throat lozenges.    Suck on frozen items such as popsicles.    Drink hot tea with lemon and honey.    Gargle with warm salt water (1/4 teaspoon of salt per 8 ounce glass of water).    Take a spoonful of honey to reduce your cough.     Additional treatment plan   Your symptoms show that you may have coronavirus (COVID-19). This illness can cause fever, cough and trouble breathing. Many people get a mild case and get better on their own. Some people can get very sick.  Based on the symptoms you have shared, I would like you to be re-checked in 2 to 3 days. Please call your family clinic to set up a video or phone visit.  Will I be tested for COVID-19?  We would like to test you for this virus.   Please call [phone] to schedule your visit. Explain that you were referred by [facility] to have a COVID-19 test. Be ready to share your [facility] visit ID number.   The following will serve as your written order for this COVID Test, ordered by me, for the indication of suspected COVID [Z20.828]: The test will be ordered in [employer], our electronic health record, after you are scheduled. It will show as ordered and authorized by Micheal Cisneros MD.  Order: COVID-19 (Coronavirus) PCR for SYMPTOMATIC testing from [facility].  1.When it's time for your COVID test:   Stay at least 6 feet away from others. (If someone will drive you to your test, stay in the backseat, as far away from the  as you can.)   Cover your mouth and nose with a mask, tissue or washcloth.  Go straight to the testing site. Don't make any stops on the way there or back.      2.Starting now: Stay home and away from others (self-isolate) until:   You've had no fever---and no medicine that reduces fever---for one full day (24 hours). And...   Your other symptoms have gotten better. For example, your cough or breathing has improved. And...   At  "least 10 days have passed since your symptoms started.       During this time, don't leave the house except for testing or medical care.   Stay in your own room, even for meals. Use your own bathroom if you can.   Stay away from others in your home. No hugging, kissing or shaking hands. No visitors.  Don't go to work, school or anywhere else.    Clean \"high touch\" surfaces often (doorknobs, counters, handles, etc.). Use a household cleaning spray or wipes. You'll find a full list of  on the EPA website: www.epa.gov/pesticide-registration/list-n-disinfectants-use-against-sars-cov-2.   Cover your mouth and nose with a mask, tissue or washcloth to avoid spreading germs.  Wash your hands and face often. Use soap and water.  Caregivers in these groups are at risk for severe illness due to COVID-19:  o People 65 years and older  o People who live in a nursing home or long-term care facility  o People with chronic disease (lung, heart, cancer, diabetes, kidney, liver, immunologic)   o People who have a weakened immune system, including those who:   Are in cancer treatment  Take medicine that weakens the immune system, such as corticosteroids  Had a bone marrow or organ transplant  Have an immune deficiency  Have poorly controlled HIV or AIDS  Are obese (body mass index of 40 or higher)  Smoke regularly   o Caregivers should wear gloves while washing dishes, handling laundry and cleaning bedrooms and bathrooms.  o Use caution when washing and drying laundry: Don't shake dirty laundry, and use the warmest water setting that you can.  o For more tips, go to www.cdc.gov/coronavirus/2019-ncov/downloads/10Things.pdf.      How can I take care of myself?   Get lots of rest. Drink extra fluids (unless a doctor has told you not to)   Take Tylenol (acetaminophen) for fever or pain. If you have liver or kidney problems, ask your family doctor if it's okay to take Tylenol.   Adults can take either:    650 mg (two 325 mg pills) " every 4 to 6 hours, or...   1,000 mg (two 500 mg pills) every 8 hours as needed.    Note: Don't take more than 3,000 mg in one day. Acetaminophen is found in many medicines (both prescribed and over-the-counter medicines). Read all labels to be sure you don't take too much.   For children, check the Tylenol bottle for the right dose. The dose is based on the child's age or weight.    If you have other health problems (like cancer, heart failure, an organ transplant or severe kidney disease): Call your specialty clinic if you don't feel better in the next 2 days.       Know when to call 911. Emergency warning signs include:    Trouble breathing or shortness of breath Pain or pressure in the chest that doesn't go away Feeling confused like you haven't felt before, or not being able to wake up Bluish-colored lips or face  Where can I get more information?   [facility] -- About COVID-19: [email or web address]   CDC -- What to Do If You're Sick: www.cdc.gov/coronavirus/2019-ncov/about/steps-when-sick.html   CDC -- Ending Home Isolation: www.cdc.gov/coronavirus/2019-ncov/hcp/disposition-in-home-patients.html   CDC -- Caring for Someone: www.cdc.gov/coronavirus/2019-ncov/if-you-are-sick/care-for-someone.html   [facility] -- Interim Guidance for Hospital Discharge to Home: www.health.[facility].[place]/diseases/coronavirus/hcp/hospdischarge.pdf   [facility] clinical trials (COVID-19 research studies): clinicalaffairs.[facility]/umn-clinical-trials    Below are the COVID-19 hotlines at the [facility] ([facility]). Interpreters are available.    For health questions: Call 802-840-4142 or 1-342.763.9431 (7 a.m. to 7 p.m.) For questions about schools and childcare: Call 870-068-8427 or 1-299.354.9276 (7 a.m. to 7 p.m.)       COVID-19 (Coronavirus) General Information  Because there is currently no vaccine to prevent infection, the best way to protect yourself is to avoid being exposed to this virus.  Common symptoms of COVID-19 include but are not limited to fever, cough, and shortness of breath. These symptoms appear 2-14 days after you are exposed to the virus that causes COVID-19. Click here for more information from the CDC on how to protect yourself.  If you are sick with COVID-19 or suspect you are infected with the virus that causes COVID-19, follow the steps here from the CDC to help prevent the disease from spreading to people in your home and community.  Click here for general information from the CDC on testing.  If you develop any of these emergency warning signs for COVID-19, get medical attention immediately:     Trouble breathing    Persistent pain or pressure in the chest    New confusion or inability to arouse    Bluish lips or face      Call your doctor or clinic before going in. Call 911 if you have a medical emergency and notify the  you have or think you may have COVID-19.  For more detailed and up to date information on COVID-19 (Coronavirus), please visit the CDC website.   Diagnosis: Cough  Diagnosis ICD: R05

## 2020-10-23 ENCOUNTER — TELEPHONE (OUTPATIENT)
Dept: UROLOGY | Facility: CLINIC | Age: 55
End: 2020-10-23

## 2020-10-23 DIAGNOSIS — R97.20 ELEVATED PROSTATE SPECIFIC ANTIGEN (PSA): Primary | ICD-10-CM

## 2020-10-23 NOTE — TELEPHONE ENCOUNTER
Reason for Call:  Other     Detailed comments: Pt's wife, Magalys, calling (no CTC on file):  Pt has appt on 10/27 and they are requesting orders be placed for any labs that need to be drawn    Phone Number Patient can be reached at: [phone] (pt's cell)    Best Time: Any    Can we leave a detailed message on this number? YES    Call taken on 10/23/2020 at 12:40 PM by Denise Behrendt

## 2020-10-23 NOTE — TELEPHONE ENCOUNTER
I spoke to Emanuel. I told him that Dr Eisenberg will want a PSA. I did warn him that there is always the possibility that after his appointment assessment, more labs could be ordered. He said that this okay but he wanted to do the PSA lab. PSA is ordered. Angelica BLOUNT Rn

## 2020-10-26 DIAGNOSIS — R97.20 ELEVATED PROSTATE SPECIFIC ANTIGEN (PSA): ICD-10-CM

## 2020-10-26 LAB — PSA SERPL-MCNC: 0.44 UG/L (ref 0–4)

## 2020-10-26 PROCEDURE — 84153 ASSAY OF PSA TOTAL: CPT | Performed by: UROLOGY

## 2020-10-27 ENCOUNTER — OFFICE VISIT (OUTPATIENT)
Dept: UROLOGY | Facility: CLINIC | Age: 55
End: 2020-10-27
Payer: COMMERCIAL

## 2020-10-27 VITALS — HEART RATE: 42 BPM | RESPIRATION RATE: 16 BRPM | DIASTOLIC BLOOD PRESSURE: 77 MMHG | SYSTOLIC BLOOD PRESSURE: 133 MMHG

## 2020-10-27 DIAGNOSIS — Z80.42 FAMILY HISTORY OF PROSTATE CANCER: Primary | ICD-10-CM

## 2020-10-27 PROCEDURE — 51798 US URINE CAPACITY MEASURE: CPT | Performed by: UROLOGY

## 2020-10-27 PROCEDURE — 99213 OFFICE O/P EST LOW 20 MIN: CPT | Mod: 25 | Performed by: UROLOGY

## 2020-10-27 NOTE — NURSING NOTE
"Initial /77 (BP Location: Left arm, Patient Position: Chair, Cuff Size: Adult Regular)   Pulse (!) 42   Resp 16  Estimated body mass index is 30.87 kg/m  as calculated from the following:    Height as of 12/30/19: 1.727 m (5' 8\").    Weight as of 1/29/20: 92.1 kg (203 lb). .    ne barrera LPN    "

## 2020-10-28 NOTE — PROGRESS NOTES
Appointment source: Established Patient  Patient name: Emanuel Landeros  Urology Staff: Diogenes Eisenberg MD    Subjective: This is a 55 year old year old male returning for follow up of prostate cancer surveillance.  He is family history of prostate cancer with his father being diagnosed at age 52.  The father's death, in his 70s, was not secondary to the prostate cancer.    Objective: No voiding issues.    Prostate is benign to palpation on rectal examination.    PSA on 10/26/2020 was 0.44 ng/mL.  This is up 0.04 ng from a year ago.    Post void residual 0 cc.    Assessment: Normal PSA in a 55-year-old male with a family history of prostate cancer.    Plan: Repeat PSA in 1 year.    Total time 15 minutes, counseling 10 minutes discussing prostate cancer surveillance

## 2021-01-15 ENCOUNTER — HEALTH MAINTENANCE LETTER (OUTPATIENT)
Age: 56
End: 2021-01-15

## 2021-04-29 DIAGNOSIS — J30.2 SEASONAL ALLERGIC RHINITIS: ICD-10-CM

## 2021-04-29 RX ORDER — FLUTICASONE PROPIONATE 50 MCG
SPRAY, SUSPENSION (ML) NASAL
Qty: 16 G | Refills: 0 | Status: SHIPPED | OUTPATIENT
Start: 2021-04-29

## 2021-05-24 ENCOUNTER — OFFICE VISIT (OUTPATIENT)
Dept: FAMILY MEDICINE | Facility: CLINIC | Age: 56
End: 2021-05-24
Payer: COMMERCIAL

## 2021-05-24 VITALS
SYSTOLIC BLOOD PRESSURE: 140 MMHG | RESPIRATION RATE: 18 BRPM | HEART RATE: 70 BPM | WEIGHT: 197 LBS | HEIGHT: 68 IN | BODY MASS INDEX: 29.86 KG/M2 | DIASTOLIC BLOOD PRESSURE: 92 MMHG | TEMPERATURE: 97.7 F

## 2021-05-24 DIAGNOSIS — M54.2 NECK PAIN: Primary | ICD-10-CM

## 2021-05-24 LAB
DEPRECATED S PYO AG THROAT QL EIA: NEGATIVE
SPECIMEN SOURCE: NORMAL
SPECIMEN SOURCE: NORMAL
STREP GROUP A PCR: NOT DETECTED

## 2021-05-24 PROCEDURE — 87651 STREP A DNA AMP PROBE: CPT | Performed by: FAMILY MEDICINE

## 2021-05-24 PROCEDURE — 99213 OFFICE O/P EST LOW 20 MIN: CPT | Performed by: FAMILY MEDICINE

## 2021-05-24 PROCEDURE — 99N1174 PR STATISTIC STREP A RAPID: Performed by: FAMILY MEDICINE

## 2021-05-24 ASSESSMENT — MIFFLIN-ST. JEOR: SCORE: 1713.09

## 2021-05-24 NOTE — PROGRESS NOTES
"  Assessment & Plan     Neck pain  Differentials discussed in detail including lymphadenopathy.  Rapid strep and ultrasound ordered for further evaluation.  Suggested well hydration, warm fluids, over-the-counter analgesia.  Will consider ENT consult and CT neck if symptoms persist.  All questions answered.  - Streptococcus A Rapid Scr w Reflx to PCR  - US Thyroid with Soft Tissue Head and Neck; Future      Hema Weaver MD  [facility]    Matias Castellanos is a 55 year old who presents for the following health issues     HPI     Concern - Swelling   Onset: 3-4 weeks   Description: possible swollen lymph node in his neck. On the left side. Dull throbbing pain when he turns his neck. As the day progresses it gets more noticeable. Dull throb   Intensity: moderate  Progression of Symptoms:  same  Therapies tried and outcome:  none   Some nights sweats, no weight loss, cough      Review of Systems   Constitutional, HEENT, cardiovascular, pulmonary, gi and gu systems are negative, except as otherwise noted.      Objective    BP (!) 140/92 (Cuff Size: Adult Regular)   Pulse 70   Temp 97.7  F (36.5  C) (Tympanic)   Resp 18   Ht 1.727 m (5' 8\")   Wt 89.4 kg (197 lb)   BMI 29.95 kg/m    Body mass index is 29.95 kg/m .  Physical Exam   GENERAL: alert and no distress  EYES: Eyes grossly normal to inspection, PERRL and conjunctivae and sclerae normal  HENT: ear canals and TM's normal, nose and mouth without ulcers or lesions  NECK: no adenopathy, no asymmetry, masses, or scars and thyroid normal to palpation  RESP: lungs clear to auscultation - no rales, rhonchi or wheezes  CV: regular rate and rhythm, normal S1 S2, no S3 or S4, no murmur, click or rub  ABDOMEN: soft, nontender, no hepatosplenomegaly  MS: no gross musculoskeletal defects noted, no edema            "

## 2021-05-24 NOTE — NURSING NOTE
"Chief Complaint   Patient presents with     Swelling     BP (!) 140/92 (Cuff Size: Adult Regular)   Pulse 70   Temp 97.7  F (36.5  C) (Tympanic)   Resp 18   Ht 1.727 m (5' 8\")   Wt 89.4 kg (197 lb)   BMI 29.95 kg/m   Estimated body mass index is 29.95 kg/m  as calculated from the following:    Height as of this encounter: 1.727 m (5' 8\").    Weight as of this encounter: 89.4 kg (197 lb).  Patient presents to the clinic using No DME      Health Maintenance that is potentially due pending provider review:    Health Maintenance Due   Topic Date Due     PREVENTIVE CARE VISIT  Never done     ANNUAL REVIEW OF HM ORDERS  Never done     ADVANCE CARE PLANNING  Never done     HIV SCREENING  Never done     HEPATITIS C SCREENING  Never done     LIPID  Never done     ZOSTER IMMUNIZATION (1 of 2) Never done     COLORECTAL CANCER SCREENING  07/20/2020                "

## 2021-05-27 ENCOUNTER — HOSPITAL ENCOUNTER (OUTPATIENT)
Dept: ULTRASOUND IMAGING | Facility: CLINIC | Age: 56
Discharge: HOME | End: 2021-05-27
Attending: FAMILY MEDICINE | Admitting: FAMILY MEDICINE
Payer: COMMERCIAL

## 2021-05-27 DIAGNOSIS — M54.2 NECK PAIN: ICD-10-CM

## 2021-05-27 PROCEDURE — 76536 US EXAM OF HEAD AND NECK: CPT

## 2021-06-16 ENCOUNTER — MYC MEDICAL ADVICE (OUTPATIENT)
Dept: FAMILY MEDICINE | Facility: CLINIC | Age: 56
End: 2021-06-16

## 2021-06-16 DIAGNOSIS — R59.1 LYMPHADENOPATHY: Primary | ICD-10-CM

## 2021-06-29 NOTE — PROGRESS NOTES
Chief Complaint   Patient presents with     Throat Problem     History of Present Illness   Emanuel Landeros is a 55 year old male who presents today for evaluation.  I am seeing this patient in consultation for lymphadenopathy at the request of the provider Dr. Weaver.  The patient reports noting a left lump near his jawline in the upper neck in March 2021.  He had noticed it after his Covid vaccination.  There is been some mild tenderness in the area.  It has not really gotten much larger.  He is also noticed some tenderness in the cheek and some mild discomfort into the left ear.  He does clench his teeth at night.  He does wear CPAP and does have sleep apnea.  He denies any significant or progressive dysphagia.  No odynophagia, pharyngodynia, dysphonia, hemoptysis, other neck lumps/bumps/swelling.  He has had some intermittent fluctuating weight but no significant unintentional weight loss.  The patient is a former tobacco user, 1/2 pack a day smoker for about 20 years, roughly 10-pack-year history.  He does still have his tonsils.    The patient underwent an ultrasound of the thyroid and soft tissues of the neck on 5/27/2021.  In reviewing the imaging report and the ultrasound screen shots, there appears to be a 2.7 x 1.3 x 0.5 cm neck lymph node and left neck level 2A.    Past Medical History  Patient Active Problem List   Diagnosis     Testosterone deficiency     TERESA (obstructive sleep apnea)     Cervical disc disorder with radiculopathy     Current Medications     Current Outpatient Medications:      fluticasone (FLONASE) 50 MCG/ACT nasal spray, Use 1 spray(s) in each nostril once daily, Disp: 16 g, Rfl: 0     ZYRTEC PO, OTC AS NEEDED, Disp: , Rfl:      ORDER FOR DME, CPAP: 8 cm H2O  Lifetime need and heated humidity.   , Disp: 1 each, Rfl:     Allergies  No Known Allergies    Social History   Social History     Socioeconomic History     Marital status:      Spouse name: Not on file     Number of  children: Not on file     Years of education: Not on file     Highest education level: Not on file   Occupational History     Not on file   Social Needs     Financial resource strain: Not on file     Food insecurity     Worry: Not on file     Inability: Not on file     Transportation needs     Medical: Not on file     Non-medical: Not on file   Tobacco Use     Smoking status: Former Smoker     Packs/day: 0.00     Quit date: 2003     Years since quittin.0     Smokeless tobacco: Never Used   Substance and Sexual Activity     Alcohol use: Yes     Comment: minimal     Drug use: No     Comment: daniella     Sexual activity: Yes     Partners: Female   Lifestyle     Physical activity     Days per week: Not on file     Minutes per session: Not on file     Stress: Not on file   Relationships     Social connections     Talks on phone: Not on file     Gets together: Not on file     Attends Uats[employer] service: Not on file     Active member of club or organization: Not on file     Attends meetings of clubs or organizations: Not on file     Relationship status: Not on file     Intimate partner violence     Fear of current or ex partner: Not on file     Emotionally abused: Not on file     Physically abused: Not on file     Forced sexual activity: Not on file   Other Topics Concern     Parent/sibling w/ CABG, MI or angioplasty before 65F 55M? Not Asked   Social History Narrative     Not on file       Family History  Family History   Problem Relation Age of Onset     Prostate Cancer Father      Prostate Cancer Paternal Grandfather      Melanoma No family hx of        Review of Systems  As per HPI and PMHx, otherwise 10+ comprehensive system review is negative.    Physical Exam  BP (!) 156/85   Pulse (!) 47   Temp 97.4  F (36.3  C)   GENERAL: Patient is a pleasant, cooperative 55 year old male in no acute distress.  HEAD: Normocephalic, atraumatic.  Hair and scalp are normal.  EYES: Pupils are equal, round, reactive to  light and accommodation.  Extraocular movements are intact.  The sclera nonicteric without injection.  The extraocular structures are normal.  EARS: Normal shape and symmetry.  No tenderness when palpating the mastoid or tragal areas bilaterally.  Otoscopic exam reveals a minimal amount of cerumen bilaterally.  The bilateral tympanic membranes are round, intact without evidence of effusion, good landmarks.  No retraction, granulation, or drainage.  NOSE: Nares are patent.  Nasal mucosa is pink and moist.  Negative anterior rhinoscopy.  ORAL CAVITY: Dentition is in reasonably good repair, some signs of bruxism. Mucous membranes are moist.  Tongue is mobile, protrudes to the midline.  Palate elevates symmetrically.  Tonsils are 1+, symmetric.  No erythema or exudate.  No oral cavity or oropharyngeal masses, lesions, ulcerations, leukoplakia.  NECK: Supple, trachea is midline.  Palpation of the left neck level 1B/2A reveals a roughly 2 cm lymph node that is mobile, not adherent to overlying skin, no overlying skin change. No tenderness or fluctuance. The other occipital, submental, submandibular, internal jugular chain, and supraclavicular chains did not demonstrate any abnormal lymph nodes or masses.  Palpation of the bilateral parotid and submandibular areas reveal no masses.  No thyromegaly.    NEUROLOGIC: Cranial nerves II through XII are grossly intact.  Voice is strong.  Patient is House-Brackmann I/VI bilaterally.  CARDIOVASCULAR: Extremities are warm and well-perfused.  No significant peripheral edema.  RESPIRATORY: Patient has nonlabored breathing without cough, wheeze, stridor.  PSYCHIATRIC: Patient is alert and oriented.  Mood and affect appear normal.  SKIN: Warm and dry.  No scalp, face, or neck lesions noted.    Procedure: Flexible Laryngoscopy  Indication: Enlarged left neck lymph node    To best visualize the upper airway anatomy and due to the chief complaint and HPI, I proceeded with flexible  fiberoptic laryngoscopy examination.  The bilateral nasal cavities were anesthetized and decongested with a mixture of lidocaine and neosynephrine.  The bilateral nasal cavities were examined using a flexible fiberoptic laryngoscope.  There were no nasal cavity masses, polyps, or mucopurulence bilaterally.  The nasopharynx had a normal appearance with normal Eustachian tube openings and fossa of Rosenmuller bilaterally.  Minimal adenoid tissue.  The base of tongue is generous with lingual tonsil hypertrophy.  There were no masses in the tongue base.  The vallecula, epiglottis, aryepiglottic folds, arytenoids, and piriform sinuses were without mass or lesion.  The bilateral true vocal folds were symmetrically mobile without nodules or masses.  The visualized portions of the infraglottic and subglottic airway are unremarkable.  The scope was removed.  The patient tolerated the procedure well.                Assessment and Plan     ICD-10-CM    1. Enlarged lymph node in neck  R59.0 LARYNGOSCOPY FLEX FIBEROPTIC, DIAGNOSTIC   2. History of tobacco use  Z87.891 LARYNGOSCOPY FLEX FIBEROPTIC, DIAGNOSTIC   3. Bruxism, sleep-related  G47.63 LARYNGOSCOPY FLEX FIBEROPTIC, DIAGNOSTIC     It was my pleasure seeing Emanuel Landeros today in clinic.  The patient presents today with a double lymph node in the left upper neck.  Based on the ultrasound imaging and clinical examination, I think this is benign.  We discussed careful observation with repeat ultrasound imaging in 2 to 3 months.  I offered the patient a follow-up examination in 2 months to repeat my physical exam.  We also discussed CT imaging of the neck to better characterize the area.  After some discussion, the patient would like to see me in 2 months for repeat physical exam.  Depending on his physical exam, we would decide to move forward with imaging for follow-up.  The patient will contact me sooner if he is having any significant or new symptoms.  We discussed the  possibility of open biopsy versus needle biopsy.     Emanuel to follow up with Primary Care provider regarding elevated blood pressure.    Herb Mark MD  Department of Otolarygology-Head and Neck Surgery  [facility]

## 2021-06-30 ENCOUNTER — OFFICE VISIT (OUTPATIENT)
Dept: OTOLARYNGOLOGY | Facility: CLINIC | Age: 56
End: 2021-06-30
Payer: COMMERCIAL

## 2021-06-30 VITALS — TEMPERATURE: 97.4 F | HEART RATE: 47 BPM | DIASTOLIC BLOOD PRESSURE: 85 MMHG | SYSTOLIC BLOOD PRESSURE: 156 MMHG

## 2021-06-30 DIAGNOSIS — Z87.891 HISTORY OF TOBACCO USE: ICD-10-CM

## 2021-06-30 DIAGNOSIS — G47.63 BRUXISM, SLEEP-RELATED: ICD-10-CM

## 2021-06-30 DIAGNOSIS — R59.0 ENLARGED LYMPH NODE IN NECK: Primary | ICD-10-CM

## 2021-06-30 PROCEDURE — 31575 DIAGNOSTIC LARYNGOSCOPY: CPT | Performed by: OTOLARYNGOLOGY

## 2021-06-30 PROCEDURE — 99203 OFFICE O/P NEW LOW 30 MIN: CPT | Mod: 25 | Performed by: OTOLARYNGOLOGY

## 2021-06-30 NOTE — NURSING NOTE
"Initial Temp 97.4  F (36.3  C)  Estimated body mass index is 29.95 kg/m  as calculated from the following:    Height as of 5/24/21: 1.727 m (5' 8\").    Weight as of 5/24/21: 89.4 kg (197 lb). .    Roxie Barrett MA on 6/30/2021 at 8:36 AM    "

## 2021-06-30 NOTE — LETTER
6/30/2021         RE: Emanuel Landeros  [address]  [facility] [id number]        Dear Colleague,    Thank you for referring your patient, Emanuel Landeros, to the [facility]. Please see a copy of my visit note below.    Chief Complaint   Patient presents with     Throat Problem     History of Present Illness   Emanuel Landeros is a 55 year old male who presents today for evaluation.  I am seeing this patient in consultation for lymphadenopathy at the request of the provider Dr. Weaver.  The patient reports noting a left lump near his jawline in the upper neck in March 2021.  He had noticed it after his Covid vaccination.  There is been some mild tenderness in the area.  It has not really gotten much larger.  He is also noticed some tenderness in the cheek and some mild discomfort into the left ear.  He does clench his teeth at night.  He does wear CPAP and does have sleep apnea.  He denies any significant or progressive dysphagia.  No odynophagia, pharyngodynia, dysphonia, hemoptysis, other neck lumps/bumps/swelling.  He has had some intermittent fluctuating weight but no significant unintentional weight loss.  The patient is a former tobacco user, 1/2 pack a day smoker for about 20 years, roughly 10-pack-year history.  He does still have his tonsils.    The patient underwent an ultrasound of the thyroid and soft tissues of the neck on 5/27/2021.  In reviewing the imaging report and the ultrasound screen shots, there appears to be a 2.7 x 1.3 x 0.5 cm neck lymph node and left neck level 2A.    Past Medical History  Patient Active Problem List   Diagnosis     Testosterone deficiency     TERESA (obstructive sleep apnea)     Cervical disc disorder with radiculopathy     Current Medications     Current Outpatient Medications:      fluticasone (FLONASE) 50 MCG/ACT nasal spray, Use 1 spray(s) in each nostril once daily, Disp: 16 g, Rfl: 0     ZYRTEC PO, OTC AS NEEDED, Disp: , Rfl:       ORDER FOR DME, CPAP: 8 cm H2O  Lifetime need and heated humidity.   , Disp: 1 each, Rfl:     Allergies  No Known Allergies    Social History   Social History     Socioeconomic History     Marital status:      Spouse name: Not on file     Number of children: Not on file     Years of education: Not on file     Highest education level: Not on file   Occupational History     Not on file   Social Needs     Financial resource strain: Not on file     Food insecurity     Worry: Not on file     Inability: Not on file     Transportation needs     Medical: Not on file     Non-medical: Not on file   Tobacco Use     Smoking status: Former Smoker     Packs/day: 0.00     Quit date: 2003     Years since quittin.0     Smokeless tobacco: Never Used   Substance and Sexual Activity     Alcohol use: Yes     Comment: minimal     Drug use: No     Comment: daniella     Sexual activity: Yes     Partners: Female   Lifestyle     Physical activity     Days per week: Not on file     Minutes per session: Not on file     Stress: Not on file   Relationships     Social connections     Talks on phone: Not on file     Gets together: Not on file     Attends Sikhism service: Not on file     Active member of club or organization: Not on file     Attends meetings of clubs or organizations: Not on file     Relationship status: Not on file     Intimate partner violence     Fear of current or ex partner: Not on file     Emotionally abused: Not on file     Physically abused: Not on file     Forced sexual activity: Not on file   Other Topics Concern     Parent/sibling w/ CABG, MI or angioplasty before 65F 55M? Not Asked   Social History Narrative     Not on file       Family History  Family History   Problem Relation Age of Onset     Prostate Cancer Father      Prostate Cancer Paternal Grandfather      Melanoma No family hx of        Review of Systems  As per HPI and PMHx, otherwise 10+ comprehensive system review is negative.    Physical  Exam  BP (!) 156/85   Pulse (!) 47   Temp 97.4  F (36.3  C)   GENERAL: Patient is a pleasant, cooperative 55 year old male in no acute distress.  HEAD: Normocephalic, atraumatic.  Hair and scalp are normal.  EYES: Pupils are equal, round, reactive to light and accommodation.  Extraocular movements are intact.  The sclera nonicteric without injection.  The extraocular structures are normal.  EARS: Normal shape and symmetry.  No tenderness when palpating the mastoid or tragal areas bilaterally.  Otoscopic exam reveals a minimal amount of cerumen bilaterally.  The bilateral tympanic membranes are round, intact without evidence of effusion, good landmarks.  No retraction, granulation, or drainage.  NOSE: Nares are patent.  Nasal mucosa is pink and moist.  Negative anterior rhinoscopy.  ORAL CAVITY: Dentition is in reasonably good repair, some signs of bruxism. Mucous membranes are moist.  Tongue is mobile, protrudes to the midline.  Palate elevates symmetrically.  Tonsils are 1+, symmetric.  No erythema or exudate.  No oral cavity or oropharyngeal masses, lesions, ulcerations, leukoplakia.  NECK: Supple, trachea is midline.  Palpation of the left neck level 1B/2A reveals a roughly 2 cm lymph node that is mobile, not adherent to overlying skin, no overlying skin change. No tenderness or fluctuance. The other occipital, submental, submandibular, internal jugular chain, and supraclavicular chains did not demonstrate any abnormal lymph nodes or masses.  Palpation of the bilateral parotid and submandibular areas reveal no masses.  No thyromegaly.    NEUROLOGIC: Cranial nerves II through XII are grossly intact.  Voice is strong.  Patient is House-Brackmann I/VI bilaterally.  CARDIOVASCULAR: Extremities are warm and well-perfused.  No significant peripheral edema.  RESPIRATORY: Patient has nonlabored breathing without cough, wheeze, stridor.  PSYCHIATRIC: Patient is alert and oriented.  Mood and affect appear normal.  SKIN:  Warm and dry.  No scalp, face, or neck lesions noted.    Procedure: Flexible Laryngoscopy  Indication: Enlarged left neck lymph node    To best visualize the upper airway anatomy and due to the chief complaint and HPI, I proceeded with flexible fiberoptic laryngoscopy examination.  The bilateral nasal cavities were anesthetized and decongested with a mixture of lidocaine and neosynephrine.  The bilateral nasal cavities were examined using a flexible fiberoptic laryngoscope.  There were no nasal cavity masses, polyps, or mucopurulence bilaterally.  The nasopharynx had a normal appearance with normal Eustachian tube openings and fossa of Rosenmuller bilaterally.  Minimal adenoid tissue.  The base of tongue is generous with lingual tonsil hypertrophy.  There were no masses in the tongue base.  The vallecula, epiglottis, aryepiglottic folds, arytenoids, and piriform sinuses were without mass or lesion.  The bilateral true vocal folds were symmetrically mobile without nodules or masses.  The visualized portions of the infraglottic and subglottic airway are unremarkable.  The scope was removed.  The patient tolerated the procedure well.                Assessment and Plan     ICD-10-CM    1. Enlarged lymph node in neck  R59.0 LARYNGOSCOPY FLEX FIBEROPTIC, DIAGNOSTIC   2. History of tobacco use  Z87.891 LARYNGOSCOPY FLEX FIBEROPTIC, DIAGNOSTIC   3. Bruxism, sleep-related  G47.63 LARYNGOSCOPY FLEX FIBEROPTIC, DIAGNOSTIC     It was my pleasure seeing Emanuel Landeros today in clinic.  The patient presents today with a double lymph node in the left upper neck.  Based on the ultrasound imaging and clinical examination, I think this is benign.  We discussed careful observation with repeat ultrasound imaging in 2 to 3 months.  I offered the patient a follow-up examination in 2 months to repeat my physical exam.  We also discussed CT imaging of the neck to better characterize the area.  After some discussion, the patient would like  to see me in 2 months for repeat physical exam.  Depending on his physical exam, we would decide to move forward with imaging for follow-up.  The patient will contact me sooner if he is having any significant or new symptoms.  We discussed the possibility of open biopsy versus needle biopsy.     Emanuel to follow up with Primary Care provider regarding elevated blood pressure.    Herb Mark MD  Department of Otolarygology-Head and Neck Surgery  [facility]         Again, thank you for allowing me to participate in the care of your patient.        Sincerely,        Herb Mark MD

## 2021-08-31 NOTE — PROGRESS NOTES
Chief Complaint   Patient presents with     Ent Problem     follow up- Enlarged lymph node in neck-left side     History of Present Illness  Emanuel Landeros is a 55 year old male who presents today for follow-up.  Patient had noticed a lump near his left jawline/upper neck in March 2021.  This was first noted after a Covid vaccination.  He had some mild tenderness in the area.  The patient underwent an ultrasound of the thyroid and soft tissues of the neck on 5/27/2021.  There appears to be 2.7 x 1.3 x 0.5 cm neck lymph node in left level 2A.  I evaluated the patient on 6/30/2021.  There was a roughly 2 cm lymph node in left level 1B/2A.  His endoscopic examination was within normal limits.  We discussed repeat examination in 2 to 3 months and then consideration of follow-up imaging in the form of either ultrasound or CT imaging.  He returns today for follow-up.    As last seeing the patient, the patient reports resolution of the fullness in his neck.  He still having intermittent symptoms over on that side, however, he thinks it is related to his bruxism/jaw clenching.  He denies tenderness in the area.  He does wear CPAP and does have sleep apnea.  The patient does recall his sleep apnea being mild.  He denies any significant or progressive dysphagia.  No odynophagia, pharyngodynia, dysphonia, hemoptysis, other neck lumps/bumps/swelling.  He has had some intermittent fluctuating weight but no significant unintentional weight loss.  The patient is a former tobacco user, 1/2 pack a day smoker for about 20 years, roughly 10-pack-year history.  He does still have his tonsils.    Past Medical History  Patient Active Problem List   Diagnosis     Testosterone deficiency     TERESA (obstructive sleep apnea)     Cervical disc disorder with radiculopathy     Current Medications    Current Outpatient Medications:      fluticasone (FLONASE) 50 MCG/ACT nasal spray, Use 1 spray(s) in each nostril once daily, Disp: 16 g, Rfl: 0      ORDER FOR DME, CPAP: 8 cm H2O  Lifetime need and heated humidity.   , Disp: 1 each, Rfl:      ZYRTEC PO, OTC AS NEEDED, Disp: , Rfl:     Allergies  No Known Allergies    Social History  Social History     Socioeconomic History     Marital status:      Spouse name: Not on file     Number of children: Not on file     Years of education: Not on file     Highest education level: Not on file   Occupational History     Not on file   Tobacco Use     Smoking status: Former Smoker     Packs/day: 0.00     Quit date: 2003     Years since quittin.2     Smokeless tobacco: Never Used   Substance and Sexual Activity     Alcohol use: Yes     Comment: minimal     Drug use: No     Comment: daniella     Sexual activity: Yes     Partners: Female   Other Topics Concern     Parent/sibling w/ CABG, MI or angioplasty before 65F 55M? Not Asked   Social History Narrative     Not on file     Social Determinants of Health     Financial Resource Strain:      Difficulty of Paying Living Expenses:    Food Insecurity:      Worried About Running Out of Food in the Last Year:      Ran Out of Food in the Last Year:    Transportation Needs:      Lack of Transportation (Medical):      Lack of Transportation (Non-Medical):    Physical Activity:      Days of Exercise per Week:      Minutes of Exercise per Session:    Stress:      Feeling of Stress :    Social Connections:      Frequency of Communication with Friends and Family:      Frequency of Social Gatherings with Friends and Family:      Attends [employer] Services:      Active Member of Clubs or Organizations:      Attends Club or Organization Meetings:      Marital Status:    Intimate Partner Violence:      Fear of Current or Ex-Partner:      Emotionally Abused:      Physically Abused:      Sexually Abused:        Family History  Family History   Problem Relation Age of Onset     Prostate Cancer Father      Prostate Cancer Paternal Grandfather      Melanoma No family hx of   "      Review of Systems  As per HPI and PMHx, otherwise 10 system review including the head and neck, constitutional, eyes, respiratory, GI, skin, neurologic, lymphatic, endocrine, and allergy systems is negative.    Physical Exam  /80 (BP Location: Right arm, Patient Position: Sitting, Cuff Size: Adult Regular)   Pulse (!) 45   Temp 98.6  F (37  C) (Tympanic)   Ht 1.727 m (5' 8\")   Wt 90.7 kg (200 lb)   BMI 30.41 kg/m    GENERAL: Patient is a pleasant, cooperative 55 year old male in no acute distress.  HEAD: Normocephalic, atraumatic.  Hair and scalp are normal.  EYES: Pupils are equal, round, reactive to light and accommodation.  Extraocular movements are intact.  The sclera nonicteric without injection.  The extraocular structures are normal.  EARS: Normal shape and symmetry.  No tenderness when palpating the mastoid or tragal areas bilaterally.   NOSE: Nares are patent.  Nasal mucosa is pink and moist.  Negative anterior rhinoscopy.  ORAL CAVITY: Dentition is in reasonably good repair, some signs of bruxism. Mucous membranes are moist.  Tongue is mobile, protrudes to the midline.  Palate elevates symmetrically.  Tonsils are 1+, symmetric.  No erythema or exudate.  No oral cavity or oropharyngeal masses, lesions, ulcerations, leukoplakia.  NECK: Supple, trachea is midline.  Palpation of the left neck level 1B/2A reveals no obvious lymphadenopathy.  I can feel the tail of the left submandibular gland which feels normal.  The previous palpable lymph node appears to resolved. The other occipital, submental, submandibular, internal jugular chain, and supraclavicular chains did not demonstrate any abnormal lymph nodes or masses.  Palpation of the bilateral parotid and submandibular areas reveal no masses.  No thyromegaly.    NEUROLOGIC: Cranial nerves II through XII are grossly intact.  Voice is strong.  Patient is House-Brackmann I/VI bilaterally.  CARDIOVASCULAR: Extremities are warm and well-perfused.  " No significant peripheral edema.  RESPIRATORY: Patient has nonlabored breathing without cough, wheeze, stridor.  PSYCHIATRIC: Patient is alert and oriented.  Mood and affect appear normal.  SKIN: Warm and dry.  No scalp, face, or neck lesions noted.    Assessment and Plan     ICD-10-CM    1. History of lymphadenopathy  Z87.898    2. History of tobacco use  Z87.891    3. Bruxism, sleep-related  G47.63    4. Obstructive sleep apnea syndrome  G47.33    5. Intolerance of continuous positive airway pressure (CPAP) ventilation  Z78.9    6. Body mass index 30.0-30.9, adult  Z68.30       It was my pleasure seeing Emanuel Landeros today in clinic.  I think the patient had a reactive lymph node in the left neck that has now resolved.  He is still having some discomfort in the jaw and neck area which I think is related to his sleep related bruxism.  He has a history of mild obstructive sleep apnea although it has been several years since he had a sleep study.  He does wear CPAP at nighttime.  We discussed a trial of an oral appliance that could also help with his sleep and bruxism.  I gave him some resources in this regard.  We also briefly discussed Inspire hypoglossal nerve stimulation depending on the severity of his sleep apnea.  I offered him a referral to sleep medicine to see if he needs a repeat sleep study to assess candidacy for other treatment options.  The patient will look into an oral appliance first will let me know if he would like to pursue sleep evaluation.    Herb Mark MD  Department of Otolaryngology-Head and Neck Surgery  [facility]

## 2021-09-01 ENCOUNTER — OFFICE VISIT (OUTPATIENT)
Dept: OTOLARYNGOLOGY | Facility: CLINIC | Age: 56
End: 2021-09-01
Payer: COMMERCIAL

## 2021-09-01 VITALS
HEIGHT: 68 IN | DIASTOLIC BLOOD PRESSURE: 80 MMHG | SYSTOLIC BLOOD PRESSURE: 139 MMHG | BODY MASS INDEX: 30.31 KG/M2 | TEMPERATURE: 98.6 F | WEIGHT: 200 LBS | HEART RATE: 45 BPM

## 2021-09-01 DIAGNOSIS — G47.33 OBSTRUCTIVE SLEEP APNEA SYNDROME: ICD-10-CM

## 2021-09-01 DIAGNOSIS — Z87.891 HISTORY OF TOBACCO USE: ICD-10-CM

## 2021-09-01 DIAGNOSIS — G47.63 BRUXISM, SLEEP-RELATED: ICD-10-CM

## 2021-09-01 DIAGNOSIS — Z78.9 INTOLERANCE OF CONTINUOUS POSITIVE AIRWAY PRESSURE (CPAP) VENTILATION: ICD-10-CM

## 2021-09-01 DIAGNOSIS — Z87.898 HISTORY OF LYMPHADENOPATHY: Primary | ICD-10-CM

## 2021-09-01 PROCEDURE — 99214 OFFICE O/P EST MOD 30 MIN: CPT | Performed by: OTOLARYNGOLOGY

## 2021-09-01 ASSESSMENT — MIFFLIN-ST. JEOR: SCORE: 1726.69

## 2021-09-01 NOTE — NURSING NOTE
"Initial /80 (BP Location: Right arm, Patient Position: Sitting, Cuff Size: Adult Regular)   Pulse (!) 45   Temp 98.6  F (37  C) (Tympanic)   Ht 1.727 m (5' 8\")   Wt 90.7 kg (200 lb)   BMI 30.41 kg/m   Estimated body mass index is 30.41 kg/m  as calculated from the following:    Height as of this encounter: 1.727 m (5' 8\").    Weight as of this encounter: 90.7 kg (200 lb). .    Florina Evans CMA    "

## 2021-09-01 NOTE — PATIENT INSTRUCTIONS
Per physician instructions      If you have questions or concerns on any instructions given to you by your provider today or if you need to schedule an appointment, you can reach us at [phone].     [email or web address]    Temporal mandibular joint dysfunction instructions    Use a bite guard at night, consider Chantal's Grind-No-More which is available OTC. May also discuss with dentist to make a oral appliance.     TMJ exercises:  1. Close your mouth to touch your upper and lower teeth without clenching them. Rest the tip of your tongue on your palate behind your upper teeth.  2. Slide the tip of your tongue backwards toward the throat as far as you can, keeping your teeth together.  3. Try to touch the soft palate with the tip of your tongue, and keep it there. Then slowly open your mouth until you feel your tongue is being pulled away from the soft palate. Keep your mouth open in this position for five seconds before closing your mouth to relax.  4. Repeat the above steps slowly for 5 minutes. You may want to check in a mirror to be sure your teeth are closed aligned straight up and down, without your jaw being off to one side.     Do the exercises twice daily for the first 2 weeks, but then you can do it more often if it seems to help. You shouldn't hear any clicks or noise from your joints, and if you do then you should restart the exercise rather than opening the mouth further. With practice, you will be able to open further without having clicking.

## 2021-09-01 NOTE — LETTER
9/1/2021         RE: Emanuel Landeros  [address]  [facility] [id number]        Dear Colleague,    Thank you for referring your patient, Emanuel Landeros, to the [facility]. Please see a copy of my visit note below.    Chief Complaint   Patient presents with     Ent Problem     follow up- Enlarged lymph node in neck-left side     History of Present Illness  Emanuel Landeros is a 55 year old male who presents today for follow-up.  Patient had noticed a lump near his left jawline/upper neck in March 2021.  This was first noted after a Covid vaccination.  He had some mild tenderness in the area.  The patient underwent an ultrasound of the thyroid and soft tissues of the neck on 5/27/2021.  There appears to be 2.7 x 1.3 x 0.5 cm neck lymph node in left level 2A.  I evaluated the patient on 6/30/2021.  There was a roughly 2 cm lymph node in left level 1B/2A.  His endoscopic examination was within normal limits.  We discussed repeat examination in 2 to 3 months and then consideration of follow-up imaging in the form of either ultrasound or CT imaging.  He returns today for follow-up.    As last seeing the patient, the patient reports resolution of the fullness in his neck.  He still having intermittent symptoms over on that side, however, he thinks it is related to his bruxism/jaw clenching.  He denies tenderness in the area.  He does wear CPAP and does have sleep apnea.  The patient does recall his sleep apnea being mild.  He denies any significant or progressive dysphagia.  No odynophagia, pharyngodynia, dysphonia, hemoptysis, other neck lumps/bumps/swelling.  He has had some intermittent fluctuating weight but no significant unintentional weight loss.  The patient is a former tobacco user, 1/2 pack a day smoker for about 20 years, roughly 10-pack-year history.  He does still have his tonsils.    Past Medical History  Patient Active Problem List   Diagnosis     Testosterone deficiency      TERESA (obstructive sleep apnea)     Cervical disc disorder with radiculopathy     Current Medications    Current Outpatient Medications:      fluticasone (FLONASE) 50 MCG/ACT nasal spray, Use 1 spray(s) in each nostril once daily, Disp: 16 g, Rfl: 0     ORDER FOR DME, CPAP: 8 cm H2O  Lifetime need and heated humidity.   , Disp: 1 each, Rfl:      ZYRTEC PO, OTC AS NEEDED, Disp: , Rfl:     Allergies  No Known Allergies    Social History  Social History     Socioeconomic History     Marital status:      Spouse name: Not on file     Number of children: Not on file     Years of education: Not on file     Highest education level: Not on file   Occupational History     Not on file   Tobacco Use     Smoking status: Former Smoker     Packs/day: 0.00     Quit date: 2003     Years since quittin.2     Smokeless tobacco: Never Used   Substance and Sexual Activity     Alcohol use: Yes     Comment: minimal     Drug use: No     Comment: daniella     Sexual activity: Yes     Partners: Female   Other Topics Concern     Parent/sibling w/ CABG, MI or angioplasty before 65F 55M? Not Asked   Social History Narrative     Not on file     Social Determinants of Health     Financial Resource Strain:      Difficulty of Paying Living Expenses:    Food Insecurity:      Worried About Running Out of Food in the Last Year:      Ran Out of Food in the Last Year:    Transportation Needs:      Lack of Transportation (Medical):      Lack of Transportation (Non-Medical):    Physical Activity:      Days of Exercise per Week:      Minutes of Exercise per Session:    Stress:      Feeling of Stress :    Social Connections:      Frequency of Communication with Friends and Family:      Frequency of Social Gatherings with Friends and Family:      Attends [employer] Services:      Active Member of Clubs or Organizations:      Attends Club or Organization Meetings:      Marital Status:    Intimate Partner Violence:      Fear of Current or  "Ex-Partner:      Emotionally Abused:      Physically Abused:      Sexually Abused:        Family History  Family History   Problem Relation Age of Onset     Prostate Cancer Father      Prostate Cancer Paternal Grandfather      Melanoma No family hx of        Review of Systems  As per HPI and PMHx, otherwise 10 system review including the head and neck, constitutional, eyes, respiratory, GI, skin, neurologic, lymphatic, endocrine, and allergy systems is negative.    Physical Exam  /80 (BP Location: Right arm, Patient Position: Sitting, Cuff Size: Adult Regular)   Pulse (!) 45   Temp 98.6  F (37  C) (Tympanic)   Ht 1.727 m (5' 8\")   Wt 90.7 kg (200 lb)   BMI 30.41 kg/m    GENERAL: Patient is a pleasant, cooperative 55 year old male in no acute distress.  HEAD: Normocephalic, atraumatic.  Hair and scalp are normal.  EYES: Pupils are equal, round, reactive to light and accommodation.  Extraocular movements are intact.  The sclera nonicteric without injection.  The extraocular structures are normal.  EARS: Normal shape and symmetry.  No tenderness when palpating the mastoid or tragal areas bilaterally.   NOSE: Nares are patent.  Nasal mucosa is pink and moist.  Negative anterior rhinoscopy.  ORAL CAVITY: Dentition is in reasonably good repair, some signs of bruxism. Mucous membranes are moist.  Tongue is mobile, protrudes to the midline.  Palate elevates symmetrically.  Tonsils are 1+, symmetric.  No erythema or exudate.  No oral cavity or oropharyngeal masses, lesions, ulcerations, leukoplakia.  NECK: Supple, trachea is midline.  Palpation of the left neck level 1B/2A reveals no obvious lymphadenopathy.  I can feel the tail of the left submandibular gland which feels normal.  The previous palpable lymph node appears to resolved. The other occipital, submental, submandibular, internal jugular chain, and supraclavicular chains did not demonstrate any abnormal lymph nodes or masses.  Palpation of the bilateral " parotid and submandibular areas reveal no masses.  No thyromegaly.    NEUROLOGIC: Cranial nerves II through XII are grossly intact.  Voice is strong.  Patient is House-Brackmann I/VI bilaterally.  CARDIOVASCULAR: Extremities are warm and well-perfused.  No significant peripheral edema.  RESPIRATORY: Patient has nonlabored breathing without cough, wheeze, stridor.  PSYCHIATRIC: Patient is alert and oriented.  Mood and affect appear normal.  SKIN: Warm and dry.  No scalp, face, or neck lesions noted.    Assessment and Plan     ICD-10-CM    1. History of lymphadenopathy  Z87.898    2. History of tobacco use  Z87.891    3. Bruxism, sleep-related  G47.63    4. Obstructive sleep apnea syndrome  G47.33    5. Intolerance of continuous positive airway pressure (CPAP) ventilation  Z78.9    6. Body mass index 30.0-30.9, adult  Z68.30       It was my pleasure seeing Emanuel Landeros today in clinic.  I think the patient had a reactive lymph node in the left neck that has now resolved.  He is still having some discomfort in the jaw and neck area which I think is related to his sleep related bruxism.  He has a history of mild obstructive sleep apnea although it has been several years since he had a sleep study.  He does wear CPAP at nighttime.  We discussed a trial of an oral appliance that could also help with his sleep and bruxism.  I gave him some resources in this regard.  We also briefly discussed Inspire hypoglossal nerve stimulation depending on the severity of his sleep apnea.  I offered him a referral to sleep medicine to see if he needs a repeat sleep study to assess candidacy for other treatment options.  The patient will look into an oral appliance first will let me know if he would like to pursue sleep evaluation.    Herb Mark MD  Department of Otolaryngology-Head and Neck Surgery  [facility]         Again, thank you for allowing me to participate in the care of your patient.        Sincerely,        Herb WALLS  MD [place]

## 2021-09-04 ENCOUNTER — HEALTH MAINTENANCE LETTER (OUTPATIENT)
Age: 56
End: 2021-09-04

## 2021-11-01 ENCOUNTER — OFFICE VISIT (OUTPATIENT)
Dept: FAMILY MEDICINE | Facility: CLINIC | Age: 56
End: 2021-11-01
Payer: COMMERCIAL

## 2021-11-01 VITALS
RESPIRATION RATE: 18 BRPM | DIASTOLIC BLOOD PRESSURE: 92 MMHG | HEIGHT: 68 IN | HEART RATE: 64 BPM | SYSTOLIC BLOOD PRESSURE: 146 MMHG | BODY MASS INDEX: 31.22 KG/M2 | WEIGHT: 206 LBS | TEMPERATURE: 97.8 F

## 2021-11-01 DIAGNOSIS — I10 BENIGN ESSENTIAL HYPERTENSION: ICD-10-CM

## 2021-11-01 DIAGNOSIS — M65.311 TRIGGER THUMB OF RIGHT HAND: Primary | ICD-10-CM

## 2021-11-01 DIAGNOSIS — M19.90 INFLAMMATORY ARTHRITIS: ICD-10-CM

## 2021-11-01 PROCEDURE — 99214 OFFICE O/P EST MOD 30 MIN: CPT | Performed by: FAMILY MEDICINE

## 2021-11-01 RX ORDER — LOSARTAN POTASSIUM 25 MG/1
25 TABLET ORAL DAILY
Qty: 90 TABLET | Refills: 1 | Status: SHIPPED | OUTPATIENT
Start: 2021-11-01 | End: 2022-05-17

## 2021-11-01 ASSESSMENT — MIFFLIN-ST. JEOR: SCORE: 1748.91

## 2021-11-01 NOTE — PROGRESS NOTES
"  Assessment & Plan     Trigger thumb of right hand  Differential discussed in detail suspect symptoms secondary to trigger thumb of right hand.  Sister to continue over-the-counter oral/topical analgesia and follow-up with orthopedic for further review and recommendations  - Orthopedic  Referral; Future    Inflammatory arthritis  Patient was following rheumatology, has tried sulfasalazine without any significant relief.  Rheumatology referral placed  - Rheumatology Referral; Future    Benign essential hypertension  Blood pressure above target goal of less than 140/90, previous blood pressure readings reviewed.  Losartan prescribed, common side effect discussed.  Healthy lifestyle modifications stressed including regular exercise, balanced diet and weight loss.  Follow-up in 2 weeks earlier if needed, due for preventive health visit as well.  - losartan (COZAAR) 25 MG tablet; Take 1 tablet (25 mg) by mouth daily      BMI:   Estimated body mass index is 31.32 kg/m  as calculated from the following:    Height as of this encounter: 1.727 m (5' 8\").    Weight as of this encounter: 93.4 kg (206 lb).   Weight management plan: Discussed healthy diet and exercise guidelines    Patient Instructions       Patient Education     Eating Heart-Healthy Foods  Eating has a big impact on your heart health. In fact, eating healthier can improve several of your heart risks at once. For instance, it helps you manage weight, cholesterol, and blood pressure. Here are ideas to help you make heart-healthy changes without giving up all the foods and flavors you love.   Getting started    Talk with your healthcare provider about eating plans, such as the DASH or Mediterranean diet. You may also be referred to a dietitian.    Change a few things at a time. Give yourself time to get used to a few eating changes before adding more.    Work to create a tasty, healthy eating plan that you can stick to for the rest of your life.    Goals " for healthy eating  Below are some tips to improve your eating habits:     Limit saturated fats and trans fats. Saturated fats raise your levels of cholesterol, so keep these fats to a minimum. They are found in foods such as fatty meats, whole milk, cheese, and palm and coconut oils. Avoid trans fats because they lower good cholesterol as well as raise bad cholesterol. Trans fats are most often found in processed foods, such as pastries, cookies, pies, muffins, fried foods, stick margarines, and shortening.    Reduce how much sodium (salt) you have. Eating too much salt may increase your blood pressure. Limit your sodium intake to 2,300 milligrams (mg) per day (the amount in 1 teaspoon of salt), or less if your healthcare provider recommends it. Dining out less often and eating fewer processed foods are two great ways to decrease the amount of salt you consume. At home, flavor your foods with other spices and herbs instead of salt.    Managing calories. A calorie is a unit of energy. Your body burns calories for fuel, but if you eat more calories than your body burns, the extras are stored as fat. Your healthcare provider can help you create a diet plan to manage your calories. This will likely include eating healthier foods and getting regular exercise. To help you track your progress, keep a diary to record what you eat and how often you exercise.  Choose the right foods  Aim to make these foods staples of your diet. If you have diabetes, you may have different recommendations than what is listed here:     Fruits and vegetables provide plenty of nutrients without a lot of calories. At meals, fill half your plate with these foods. Choose between fresh, frozen, canned, or dried without added sauces, salt, or sugars. Split the other half of your plate between whole grains and lean protein.    Whole grains are high in fiber and rich in vitamins and nutrients. Good choices include whole wheat bread, pasta, oats, and  brown rice. Make at least half of your grains whole grains.    Lean proteins give you nutrition with less fat. Good choices include fish, skinless chicken and turkey, and beans. Draining the fat from cooked ground meat is another way to reduce the amount of fat you eat.    Low-fat and nonfat dairy provide nutrients without a lot of fat. Try low-fat or nonfat milk, cheese, or yogurt.    Healthy fats can be good for you in small amounts. These are unsaturated fats, such as olive oil, nuts, and fish. Try to have at least 2 servings per week of fatty fish, such as salmon, sardines, mackerel, rainbow trout, and albacore tuna. These contain omega-3 fatty acids, which are good for your heart. Flaxseed and walnuts are other sources of heart-healthy fats.  More on heart-healthy eating  Read food labels  Healthy eating starts at the grocery store. Be sure to pay attention to food labels on packaged foods. Look for products that are high in fiber and protein, and low in saturated fat, added sugars, and sodium. Avoid products that contain trans fat. And pay close attention to serving size. For instance, if you plan to eat two servings, double all the numbers on the label.   Prepare food right  A key part of healthy cooking is cutting down on added fat, sugar and salt. Look on the internet for lower-fat, lower-sodium recipes without a lot of added sugars. Also try these tips:     Remove fat from meat and skin from poultry before cooking.    Skim fat from the surface of soups and sauces.    Broil, roast, boil, bake, steam, grill, or microwave food without added fats.    Choose ingredients that spice up your food without adding calories, fat, sugar, or sodium. Try these items: horseradish, hot sauce, lemon, mustard, nonfat salad dressings, and vinegar. Small amounts of olive oil-based vinaigrettes are OK, too. For salt-free herbs and spices, try basil, cilantro, cinnamon, cumin, paprika, pepper, and rosemary.  [employer] last  reviewed this educational content on 7/1/2020 2000-2021 [employer]. All rights reserved. This information is not intended as a substitute for professional medical care. Always follow your healthcare professional's instructions.           Patient Education     Exercise for a Healthier Heart  You may wonder how you can improve the health of your heart. If you re thinking about exercise, you re on the right track. You don t need to become an athlete. But you do need a certain amount of brisk exercise to help strengthen your heart. If you have been diagnosed with a heart condition, your healthcare provider may advise exercise to help stabilize your condition. To help make exercise a habit, choose safe, fun activities.      Exercise with a friend. When activity is fun, you're more likely to stick with it.   Before you start  Check with your healthcare provider before starting an exercise program. This is especially important if you have not been active for a while. It's also important if you have a long-term (chronic) health problem such as heart disease, diabetes, or obesity. Or if you are at high risk for having these problems.   Why exercise?  Exercising regularly offers many healthy rewards. It can help you do all of the following:     Improve your blood cholesterol level to help prevent further heart trouble    Lower your blood pressure to help prevent a stroke or heart attack    Control diabetes, or reduce your risk of getting this disease    Improve your heart and lung function    Reach and stay at a healthy weight    Make your muscles stronger so you can stay active    Prevent falls and fractures by slowing the loss of bone mass (osteoporosis)    Manage stress better    Reduce your blood pressure    Improve your sense of self and your body image  Exercise tips      Ease into your routine. Set small goals. Then build on them. If you are not sure what your activity level should be, talk with your  healthcare provider first before starting an exercise routine.    Exercise on most days. Aim for a total of 150 minutes (2 hours and 30 minutes) or more of moderate-intensity aerobic activity each week. Or 75 minutes (1 hour and 15 minutes) or more of vigorous-intensity aerobic activity each week. Or try for a combination of both. Moderate activity means that you breathe heavier and your heart rate increases but you can still talk. Think about doing 40 minutes of moderate exercise, 3 to 4 times a week. For best results, activity should last for about 40 minutes to lower blood pressure and cholesterol. It's OK to work up to the 40-minute period over time. Examples of moderate-intensity activity are walking 1 mile in 15 minutes. Or doing 30 to 45 minutes of yard work.    Step up your daily activity level.  Along with your exercise program, try being more active the whole day. Walk instead of drive. Or park further away so that you take more steps each day. Do more household tasks or yard work. You may not be able to meet the advised mount of physical activity. But doing some moderate- or vigorous-intensity aerobic activity can help reduce your risk for heart disease. Your healthcare provider can help you figure out what is best for you.    Choose 1 or more activities you enjoy.  Walking is one of the easiest things you can do. You can also try swimming, riding a bike, dancing, or taking an exercise class.    When to call your healthcare provider  Call your healthcare provider if you have any of these:     Chest pain or feel dizzy or lightheaded    Burning, tightness, pressure, or heaviness in your chest, neck, shoulders, back, or arms    Abnormal shortness of breath    More joint or muscle pain    A very fast or irregular heartbeat (palpitations)  [employer] last reviewed this educational content on 7/1/2019 2000-2021 [employer]. All rights reserved. This information is not intended as a substitute for  professional medical care. Always follow your healthcare professional's instructions.           Patient Education     Eating Heart-Healthy Food: Using the DASH Plan    Eating for your heart doesn t have to be hard or boring. You just need to know how to make healthier choices. The DASH eating plan has been developed to help you do just that. DASH stands for Dietary Approaches to Stop Hypertension. It is a plan that has been proven to be healthier for your heart and to lower your risk for high blood pressure. It can also help lower your risk for cancer, heart disease, osteoporosis, and diabetes.  Choosing from each food group  Choose foods from each of the food groups below each day. Try to get the recommended number of servings for each food group. The serving numbers are based on a diet of 2,000 calories a day. Talk with your healthcare provider if you re not sure about your calorie needs. Along with getting the correct servings, the DASH plan also advises less than 2,300 mg of salt (sodium) per day. Lowering sodium intake to 1,500 mg per day lowers blood pressure even more. (There's about 2,300 mg of sodium in 1 teaspoon of salt.)      Grains  Servings: 6 to 8 a day  A serving is:    1 slice bread    1 ounce dry cereal    Half a cup cooked rice, pasta or cereal  Best choices: Whole grains and any grains high in fiber. Vegetables  Servings: 4 to 5 a day  A serving is:    1 cup raw leafy vegetable    Half a cup cut-up raw or cooked vegetable    Half a cup vegetable juice  Best choices: Fresh or frozen vegetables prepared without added salt or fat.   Fruits  Servings: 4 to 5 a day  A serving is:    1 medium fruit    One-quarter cup dried fruit    Half a cup fresh, frozen, or canned fruit    Half a cup of 100% fruit juices  Best choices: A variety of fresh fruits of different colors. Whole fruits are a better choice than fruit juices. Low-fat or fat-free dairy  Servings: 2 to 3 a day  A serving is:    1 cup milk    1  cup yogurt    One and a half ounces cheese  Best choices: Skim or 1% milk, low-fat or fat-free yogurt or buttermilk, and low-fat cheeses.         Lean meats, poultry, fish  Servings: 6 or fewer a day  A serving is:    1 ounce cooked meats, poultry, or fish    1 egg  Best choices: Lean poultry and fish. Trim away visible fat. Broil, grill, roast, or boil instead of frying. Remove skin from poultry before eating. Limit how much red meat you eat.  Nuts, seeds, beans  Servings: 4 to 5 a week  A serving is:    One-third cup nuts (one and a half ounces)    2 tablespoons nut butter or seeds    Half a cup cooked dry beans or legumes  Best choices: Dry roasted nuts with no salt added, lentils, kidney beans, garbanzo beans, and whole [place] beans.   Fats and oils  Servings: 2 to 3 a day  A serving is:    1 teaspoon vegetable oil    1 teaspoon soft margarine    1 tablespoon mayonnaise    2 tablespoons salad dressing  Best choices: Nut and vegetable oils (nontropical vegetable oils), such as olive and canola oil. Sweets  Servings: 5 a week or fewer  A serving is:    1 tablespoon sugar, maple syrup, or honey    1 tablespoon jam or jelly    1 half-ounce jelly beans (about 15)    1 cup lemonade  Best choices: Dried fruit can be a satisfying sweet. Choose low-fat sweets. And watch your serving sizes!      For more on the DASH eating plan, visit:  www.nhlbi.nih.gov/health/health-topics/topics/dash   [employer] last reviewed this educational content on 7/1/2019 2000-2021 [employer]. All rights reserved. This information is not intended as a substitute for professional medical care. Always follow your healthcare professional's instructions.               Return in about 2 weeks (around 11/15/2021) for Physical Exam, BP Recheck.    Hema Weaver MD  [facility]    Matias Castellanos is a 56 year old who presents for the following health issues     HPI     Musculoskeletal  "problem/pain  Onset/Duration: 2 months   Description  Location: Thumb - right hand. Very painful to bend.   Joint Swelling: YES- does have arthritis   Redness: no  Pain: YES  Warmth: no  Intensity:  moderate  Progression of Symptoms:  worsening  Accompanying signs and symptoms:   Fevers: no  Numbness/tingling/weakness: YES- can tell he has lost strength. Hard to open a jar   History  Trauma to the area: no  Recent illness:  no  Previous similar problem: YES- has arthritis, not sure if related   Previous evaluation:  no  Precipitating or alleviating factors:  Aggravating factors include: exercise and overuse  Therapies tried and outcome: rest/inactivity, hand squeezers to help strengthen       Review of Systems    ROS: 10 point ROS neg other than the symptoms noted above in the HPI.      Objective    BP (!) 146/92 (Cuff Size: Adult Regular)   Pulse 64   Temp 97.8  F (36.6  C) (Tympanic)   Resp 18   Ht 1.727 m (5' 8\")   Wt 93.4 kg (206 lb)   BMI 31.32 kg/m    Body mass index is 31.32 kg/m .  Physical Exam   GENERAL: alert, no distress and obese  EYES: Eyes grossly normal to inspection, PERRL and conjunctivae and sclerae normal  HENT: normal cephalic/atraumatic, nose and mouth without ulcers or lesions, oropharynx clear and oral mucous membranes moist  NECK: no adenopathy, no asymmetry, masses, or scars and thyroid normal to palpation  RESP: lungs clear to auscultation - no rales, rhonchi or wheezes  CV: regular rate and rhythm, normal S1 S2, no S3 or S4, no murmur, click or rub, no peripheral edema and peripheral pulses strong  ABDOMEN: soft, nontender, no hepatosplenomegaly, no masses and bowel sounds normal  MS: subjective right thumb pain, no joint swelling, skin discoloration noted, slightly stiff thumb flexion otherwise range of movement normal, normal capillary refills, sensation to touch and pressure intact  NEURO: Normal strength and tone, mentation intact and speech normal  PSYCH: mentation appears " normal, affect normal/bright      Wt Readings from Last 10 Encounters:   11/01/21 93.4 kg (206 lb)   09/01/21 90.7 kg (200 lb)   05/24/21 89.4 kg (197 lb)   01/29/20 92.1 kg (203 lb)   12/30/19 91.2 kg (201 lb)   12/05/18 95.3 kg (210 lb)   11/20/18 95.3 kg (210 lb)   08/13/18 90.7 kg (200 lb)   05/22/18 90.7 kg (200 lb)   05/09/18 90.7 kg (200 lb)

## 2021-11-01 NOTE — NURSING NOTE
"Chief Complaint   Patient presents with     Musculoskeletal Problem     BP (!) 146/92 (Cuff Size: Adult Regular)   Pulse 64   Temp 97.8  F (36.6  C) (Tympanic)   Resp 18   Ht 1.727 m (5' 8\")   Wt 93.4 kg (206 lb)   BMI 31.32 kg/m   Estimated body mass index is 31.32 kg/m  as calculated from the following:    Height as of this encounter: 1.727 m (5' 8\").    Weight as of this encounter: 93.4 kg (206 lb).  Patient presents to the clinic using No DME      Health Maintenance that is potentially due pending provider review:    Health Maintenance Due   Topic Date Due     PREVENTIVE CARE VISIT  Never done     ANNUAL REVIEW OF HM ORDERS  Never done     ADVANCE CARE PLANNING  Never done     HIV SCREENING  Never done     HEPATITIS C SCREENING  Never done     LIPID  Never done     ZOSTER IMMUNIZATION (1 of 2) Never done     COLORECTAL CANCER SCREENING  07/20/2020     INFLUENZA VACCINE (1) 09/01/2021                "

## 2021-11-01 NOTE — PATIENT INSTRUCTIONS
Patient Education     Eating Heart-Healthy Foods  Eating has a big impact on your heart health. In fact, eating healthier can improve several of your heart risks at once. For instance, it helps you manage weight, cholesterol, and blood pressure. Here are ideas to help you make heart-healthy changes without giving up all the foods and flavors you love.   Getting started    Talk with your healthcare provider about eating plans, such as the DASH or Mediterranean diet. You may also be referred to a dietitian.    Change a few things at a time. Give yourself time to get used to a few eating changes before adding more.    Work to create a tasty, healthy eating plan that you can stick to for the rest of your life.    Goals for healthy eating  Below are some tips to improve your eating habits:     Limit saturated fats and trans fats. Saturated fats raise your levels of cholesterol, so keep these fats to a minimum. They are found in foods such as fatty meats, whole milk, cheese, and palm and coconut oils. Avoid trans fats because they lower good cholesterol as well as raise bad cholesterol. Trans fats are most often found in processed foods, such as pastries, cookies, pies, muffins, fried foods, stick margarines, and shortening.    Reduce how much sodium (salt) you have. Eating too much salt may increase your blood pressure. Limit your sodium intake to 2,300 milligrams (mg) per day (the amount in 1 teaspoon of salt), or less if your healthcare provider recommends it. Dining out less often and eating fewer processed foods are two great ways to decrease the amount of salt you consume. At home, flavor your foods with other spices and herbs instead of salt.    Managing calories. A calorie is a unit of energy. Your body burns calories for fuel, but if you eat more calories than your body burns, the extras are stored as fat. Your healthcare provider can help you create a diet plan to manage your calories. This will likely include  eating healthier foods and getting regular exercise. To help you track your progress, keep a diary to record what you eat and how often you exercise.  Choose the right foods  Aim to make these foods staples of your diet. If you have diabetes, you may have different recommendations than what is listed here:     Fruits and vegetables provide plenty of nutrients without a lot of calories. At meals, fill half your plate with these foods. Choose between fresh, frozen, canned, or dried without added sauces, salt, or sugars. Split the other half of your plate between whole grains and lean protein.    Whole grains are high in fiber and rich in vitamins and nutrients. Good choices include whole wheat bread, pasta, oats, and brown rice. Make at least half of your grains whole grains.    Lean proteins give you nutrition with less fat. Good choices include fish, skinless chicken and turkey, and beans. Draining the fat from cooked ground meat is another way to reduce the amount of fat you eat.    Low-fat and nonfat dairy provide nutrients without a lot of fat. Try low-fat or nonfat milk, cheese, or yogurt.    Healthy fats can be good for you in small amounts. These are unsaturated fats, such as olive oil, nuts, and fish. Try to have at least 2 servings per week of fatty fish, such as salmon, sardines, mackerel, rainbow trout, and albacore tuna. These contain omega-3 fatty acids, which are good for your heart. Flaxseed and walnuts are other sources of heart-healthy fats.  More on heart-healthy eating  Read food labels  Healthy eating starts at the grocery store. Be sure to pay attention to food labels on packaged foods. Look for products that are high in fiber and protein, and low in saturated fat, added sugars, and sodium. Avoid products that contain trans fat. And pay close attention to serving size. For instance, if you plan to eat two servings, double all the numbers on the label.   Prepare food right  A key part of healthy  cooking is cutting down on added fat, sugar and salt. Look on the internet for lower-fat, lower-sodium recipes without a lot of added sugars. Also try these tips:     Remove fat from meat and skin from poultry before cooking.    Skim fat from the surface of soups and sauces.    Broil, roast, boil, bake, steam, grill, or microwave food without added fats.    Choose ingredients that spice up your food without adding calories, fat, sugar, or sodium. Try these items: horseradish, hot sauce, lemon, mustard, nonfat salad dressings, and vinegar. Small amounts of olive oil-based vinaigrettes are OK, too. For salt-free herbs and spices, try basil, cilantro, cinnamon, cumin, paprika, pepper, and rosemary.  [employer] last reviewed this educational content on 7/1/2020 2000-2021 [employer]. All rights reserved. This information is not intended as a substitute for professional medical care. Always follow your healthcare professional's instructions.           Patient Education     Exercise for a Healthier Heart  You may wonder how you can improve the health of your heart. If you re thinking about exercise, you re on the right track. You don t need to become an athlete. But you do need a certain amount of brisk exercise to help strengthen your heart. If you have been diagnosed with a heart condition, your healthcare provider may advise exercise to help stabilize your condition. To help make exercise a habit, choose safe, fun activities.      Exercise with a friend. When activity is fun, you're more likely to stick with it.   Before you start  Check with your healthcare provider before starting an exercise program. This is especially important if you have not been active for a while. It's also important if you have a long-term (chronic) health problem such as heart disease, diabetes, or obesity. Or if you are at high risk for having these problems.   Why exercise?  Exercising regularly offers many healthy rewards.  It can help you do all of the following:     Improve your blood cholesterol level to help prevent further heart trouble    Lower your blood pressure to help prevent a stroke or heart attack    Control diabetes, or reduce your risk of getting this disease    Improve your heart and lung function    Reach and stay at a healthy weight    Make your muscles stronger so you can stay active    Prevent falls and fractures by slowing the loss of bone mass (osteoporosis)    Manage stress better    Reduce your blood pressure    Improve your sense of self and your body image  Exercise tips      Ease into your routine. Set small goals. Then build on them. If you are not sure what your activity level should be, talk with your healthcare provider first before starting an exercise routine.    Exercise on most days. Aim for a total of 150 minutes (2 hours and 30 minutes) or more of moderate-intensity aerobic activity each week. Or 75 minutes (1 hour and 15 minutes) or more of vigorous-intensity aerobic activity each week. Or try for a combination of both. Moderate activity means that you breathe heavier and your heart rate increases but you can still talk. Think about doing 40 minutes of moderate exercise, 3 to 4 times a week. For best results, activity should last for about 40 minutes to lower blood pressure and cholesterol. It's OK to work up to the 40-minute period over time. Examples of moderate-intensity activity are walking 1 mile in 15 minutes. Or doing 30 to 45 minutes of yard work.    Step up your daily activity level.  Along with your exercise program, try being more active the whole day. Walk instead of drive. Or park further away so that you take more steps each day. Do more household tasks or yard work. You may not be able to meet the advised mount of physical activity. But doing some moderate- or vigorous-intensity aerobic activity can help reduce your risk for heart disease. Your healthcare provider can help you figure  out what is best for you.    Choose 1 or more activities you enjoy.  Walking is one of the easiest things you can do. You can also try swimming, riding a bike, dancing, or taking an exercise class.    When to call your healthcare provider  Call your healthcare provider if you have any of these:     Chest pain or feel dizzy or lightheaded    Burning, tightness, pressure, or heaviness in your chest, neck, shoulders, back, or arms    Abnormal shortness of breath    More joint or muscle pain    A very fast or irregular heartbeat (palpitations)  [employer] last reviewed this educational content on 7/1/2019 2000-2021 [employer]. All rights reserved. This information is not intended as a substitute for professional medical care. Always follow your healthcare professional's instructions.           Patient Education     Eating Heart-Healthy Food: Using the DASH Plan    Eating for your heart doesn t have to be hard or boring. You just need to know how to make healthier choices. The DASH eating plan has been developed to help you do just that. DASH stands for Dietary Approaches to Stop Hypertension. It is a plan that has been proven to be healthier for your heart and to lower your risk for high blood pressure. It can also help lower your risk for cancer, heart disease, osteoporosis, and diabetes.  Choosing from each food group  Choose foods from each of the food groups below each day. Try to get the recommended number of servings for each food group. The serving numbers are based on a diet of 2,000 calories a day. Talk with your healthcare provider if you re not sure about your calorie needs. Along with getting the correct servings, the DASH plan also advises less than 2,300 mg of salt (sodium) per day. Lowering sodium intake to 1,500 mg per day lowers blood pressure even more. (There's about 2,300 mg of sodium in 1 teaspoon of salt.)      Grains  Servings: 6 to 8 a day  A serving is:    1 slice bread    1  ounce dry cereal    Half a cup cooked rice, pasta or cereal  Best choices: Whole grains and any grains high in fiber. Vegetables  Servings: 4 to 5 a day  A serving is:    1 cup raw leafy vegetable    Half a cup cut-up raw or cooked vegetable    Half a cup vegetable juice  Best choices: Fresh or frozen vegetables prepared without added salt or fat.   Fruits  Servings: 4 to 5 a day  A serving is:    1 medium fruit    One-quarter cup dried fruit    Half a cup fresh, frozen, or canned fruit    Half a cup of 100% fruit juices  Best choices: A variety of fresh fruits of different colors. Whole fruits are a better choice than fruit juices. Low-fat or fat-free dairy  Servings: 2 to 3 a day  A serving is:    1 cup milk    1 cup yogurt    One and a half ounces cheese  Best choices: Skim or 1% milk, low-fat or fat-free yogurt or buttermilk, and low-fat cheeses.         Lean meats, poultry, fish  Servings: 6 or fewer a day  A serving is:    1 ounce cooked meats, poultry, or fish    1 egg  Best choices: Lean poultry and fish. Trim away visible fat. Broil, grill, roast, or boil instead of frying. Remove skin from poultry before eating. Limit how much red meat you eat.  Nuts, seeds, beans  Servings: 4 to 5 a week  A serving is:    One-third cup nuts (one and a half ounces)    2 tablespoons nut butter or seeds    Half a cup cooked dry beans or legumes  Best choices: Dry roasted nuts with no salt added, lentils, kidney beans, garbanzo beans, and whole [place] beans.   Fats and oils  Servings: 2 to 3 a day  A serving is:    1 teaspoon vegetable oil    1 teaspoon soft margarine    1 tablespoon mayonnaise    2 tablespoons salad dressing  Best choices: Nut and vegetable oils (nontropical vegetable oils), such as olive and canola oil. Sweets  Servings: 5 a week or fewer  A serving is:    1 tablespoon sugar, maple syrup, or honey    1 tablespoon jam or jelly    1 half-ounce jelly beans (about 15)    1 cup lemonade  Best choices: Dried fruit  can be a satisfying sweet. Choose low-fat sweets. And watch your serving sizes!      For more on the DASH eating plan, visit:  www.nhlbi.nih.gov/health/health-topics/topics/dash   [employer] last reviewed this educational content on 7/1/2019 2000-2021 [employer]. All rights reserved. This information is not intended as a substitute for professional medical care. Always follow your healthcare professional's instructions.

## 2021-11-08 ENCOUNTER — OFFICE VISIT (OUTPATIENT)
Dept: ORTHOPEDICS | Facility: CLINIC | Age: 56
End: 2021-11-08
Attending: FAMILY MEDICINE
Payer: COMMERCIAL

## 2021-11-08 VITALS
WEIGHT: 206 LBS | DIASTOLIC BLOOD PRESSURE: 82 MMHG | HEIGHT: 68 IN | SYSTOLIC BLOOD PRESSURE: 124 MMHG | BODY MASS INDEX: 31.22 KG/M2

## 2021-11-08 DIAGNOSIS — M65.311 TRIGGER THUMB OF RIGHT HAND: ICD-10-CM

## 2021-11-08 PROCEDURE — 20550 NJX 1 TENDON SHEATH/LIGAMENT: CPT | Mod: RT | Performed by: FAMILY MEDICINE

## 2021-11-08 PROCEDURE — 99213 OFFICE O/P EST LOW 20 MIN: CPT | Mod: 25 | Performed by: FAMILY MEDICINE

## 2021-11-08 RX ORDER — BETAMETHASONE SODIUM PHOSPHATE AND BETAMETHASONE ACETATE 3; 3 MG/ML; MG/ML
6 INJECTION, SUSPENSION INTRA-ARTICULAR; INTRALESIONAL; INTRAMUSCULAR; SOFT TISSUE
Status: DISCONTINUED | OUTPATIENT
Start: 2021-11-08 | End: 2022-02-08

## 2021-11-08 RX ORDER — ROPIVACAINE HYDROCHLORIDE 5 MG/ML
0.5 INJECTION, SOLUTION EPIDURAL; INFILTRATION; PERINEURAL
Status: DISCONTINUED | OUTPATIENT
Start: 2021-11-08 | End: 2022-02-08

## 2021-11-08 RX ADMIN — ROPIVACAINE HYDROCHLORIDE 0.5 ML: 5 INJECTION, SOLUTION EPIDURAL; INFILTRATION; PERINEURAL at 15:25

## 2021-11-08 RX ADMIN — BETAMETHASONE SODIUM PHOSPHATE AND BETAMETHASONE ACETATE 6 MG: 3; 3 INJECTION, SUSPENSION INTRA-ARTICULAR; INTRALESIONAL; INTRAMUSCULAR; SOFT TISSUE at 15:25

## 2021-11-08 ASSESSMENT — MIFFLIN-ST. JEOR: SCORE: 1748.91

## 2021-11-08 NOTE — PATIENT INSTRUCTIONS
# Right Trigger Thumb: Symptoms over the past 2 to 3 months without inciting injury.  He does have triggering over the A1 pulley at the right thumb on exam.  Reviewed previous imaging showing no significant findings in the thumb.  Given this plan to treat as below follow-up if not improving.  Image Findings: Reviewed previous hand x-ray/MRI  Treatment: Activities as tolerated  Job: No restrictions  Medications/Injections: Limited tylenol/ibuprofen for pain for 1-2 weeks, right trigger thumb steroid injection  Follow-up: 3 to 4 months if symptoms return can consider repeat injection versus referral to hand surgery    Please call [phone]   Ask for my team if you have any questions or concerns    If you have not yet received the influenza vaccine but would like to get one, please call  [phone] or you can schedule via [employer]    It was great seeing you today!    Servando Haider MD, [facility] Injection Discharge Instructions    Procedure: right trigger thumb steroid injection       You may shower, however avoid swimming, tub baths or hot tubs for 24 hours following your procedure    You may have a mild to moderate increase in pain for several days following the injection.    It may take up to 14 days for the steroid medication to start working although you may feel the effect as early as a few days after the procedure.    You may use ice packs for 10-15 minutes, 3 to 4 times a day at the injection site for comfort    You may use anti-inflammatory medications (such as Ibuprofen or Aleve or Advil) or Tylenol for pain control if necessary    If you were fasting, you may resume your normal diet and medications after the procedure    If you have diabetes, check your blood sugar more frequently than usual as your blood sugar may be higher than normal for 10-14 days following a steroid injection. Contact your doctor who manages your diabetes if your blood sugar is higher than usual      If you  experience any of the following, call [facility] [place] @ [phone] or [phone]  -Fever over 100 degree F  -Swelling, bleeding, redness, drainage, warmth at the injection site  - New or worsening pain    Patient Education     Treating Trigger Finger    Trigger finger occurs when the tissue inside your finger or thumb becomes inflamed. Mild cases can be treated without surgery. If the problem is severe, surgery may be needed. Your healthcare provider will talk with you about your options.  Nonsurgical treatment  For mild symptoms, your healthcare provider may have you rest the finger or thumb. You may also be told to take anti-inflammatory medicines. These include ibuprofen or aspirin. You may be given an injection of medicine in the base of the finger or thumb. This typically is a steroid, such as cortisone.  Surgery  If nonsurgical treatments don t ease your symptoms, you may need surgery. A tendon is a cordlike fiber that attaches muscle to bone and allows joints to bend. The tendon is surrounded by a protective cover called a sheath. During surgery, the sheath in your finger or thumb is opened to enlarge the space and release the swollen tendon. This allows the finger or thumb to bend and straighten normally. Surgery takes about 20 minutes. It can often be done using a local anesthetic. You may also be sedated. You will likely be able to go home the same day. Your hand will be wrapped in a soft bandage. You may need to wear a plaster splint for a short time to keep the finger or thumb still as it heals. The stitches will be removed in about 2 weeks. Your healthcare provider will talk with you about the risks and benefits of surgery.  [employer] last reviewed this educational content on 1/1/2018 2000-2021 [employer]. All rights reserved. This information is not intended as a substitute for professional medical care. Always follow your healthcare professional's instructions.

## 2021-11-08 NOTE — LETTER
11/8/2021         RE: Emanuel Landeros  [address]  [place] [facility] [address]        Dear Colleague,    Thank you for referring your patient, Emanuel Landeros, to the [facility]. Please see a copy of my visit note below.    ASSESSMENT & PLAN    Emanuel was seen today for pain.    Diagnoses and all orders for this visit:    Trigger thumb of right hand  -     Orthopedic  Referral  -     Hand / Upper Extremity Injection/Arthrocentesis: R thumb A1      This issue is acute and Worsening.    # Right Trigger Thumb: Symptoms over the past 2 to 3 months without inciting injury.  He does have triggering over the A1 pulley at the right thumb on exam.  Reviewed previous imaging showing no significant findings in the thumb.  Given this plan to treat as below follow-up if not improving.  Image Findings: Reviewed previous hand x-ray/MRI  Treatment: Activities as tolerated  Job: No restrictions  Medications/Injections: Limited tylenol/ibuprofen for pain for 1-2 weeks, right trigger thumb steroid injection  Follow-up: 3 to 4 months if symptoms return can consider repeat injection versus referral to hand surgery      Servando Haider MD  [facility]    -----  Chief Complaint   Patient presents with     Right Hand - Pain       SUBJECTIVE  Emanuel Landeros is a/an 56 year old male who is seen in consultation at the request of Hema Weaver M.D. for evaluation of right thumb pain.     The patient is seen by themselves.  The patient is Right handed    Onset: 2-3 month(s) ago. Reports insidious onset without acute precipitating event.  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  Location of Pain: right thumb   Worsened by: gripping, thumb flexion   Better with: lifting weights   Treatments tried: exercise  Associated symptoms: pain, locking     Orthopedic/Surgical history: No surgery  Social History/Occupation: Program  "Admin for [employer]     No family history pertinent to patient's problem today.     REVIEW OF SYSTEMS:  Review of Systems      OBJECTIVE:  /82   Ht 1.727 m (5' 8\")   Wt 93.4 kg (206 lb)   BMI 31.32 kg/m     General: healthy, alert and in no distress  HEENT: no scleral icterus or conjunctival erythema  Skin: no suspicious lesions or rash. No jaundice.  CV: distal perfusion intact    Resp: normal respiratory effort without conversational dyspnea   Psych: normal mood and affect  Gait: normal steady gait with appropriate coordination and balance    Neuro: Normal light sensory exam of right upper extremity     RIGHT HAND  Inspection:    No swelling, bruising, discoloration, or obvious deformity or asymmetry  Palpation:   Carpals: normal   Metacarpals: normal   Thumb: triggering   Fingers: normal  Range of Motion: mild limitation with thumb flexion 2/2 triggering    Full active flexion and extension at MCP, PIP, and DIP joints; normal finger cascade without malrotation.  Wrist pronation, supination, and ulnar/radial deviation normal.  Strength:     limited 2/2 trigger thumb, extension full, flexion full, abduction full, adduction full, opposition full  Special Tests:    Positive: palpable triggering 1st digit    Negative: flexor digitorum superficialis testing, flexor digitorum profundus testing      RADIOLOGY:  I independently, visualized and reviewed these images with the patient  Right hand x-ray, MRI  IMPRESSION:  1. Third and fourth metacarpal head small nonspecific cysts. No other  evidence of metacarpophalangeal joint effusion, synovitis, or erosion.  2. Small foci of carpal increased signal intensity which may represent  marrow edema, or developing cysts or erosions. No midcarpal joint  effusion is seen.  3. Dorsal subcutaneous edema as above. This could be related to soft  tissue contusion but is indeterminate in etiology.     MCKINLEY ABBOTT MD    RIGHT HAND THREE VIEWS   11/20/2018 4:19 PM "      HISTORY: Right hand pain.     COMPARISON: None.                                                                      IMPRESSION: Normal.     KIMBERLEY TIWARI MD  Reviewed PCP note on 11/1/21  Review of external notes as documented elsewhere in note  Review of the result(s) of each unique test - right hand MRI and x-ray    Hand / Upper Extremity Injection/Arthrocentesis: R thumb A1    Date/Time: 11/8/2021 3:25 PM  Performed by: Servando Haider MD  Authorized by: Servando Haider MD     Indications:  Pain and therapeutic  Needle Size:  25 G  Guidance: landmark    Approach:  Volar  Condition: trigger finger    Location:  Thumb    Site:  R thumb A1  Medications:  6 mg betamethasone acet & sod phos 6 (3-3) MG/ML; 0.5 mL ropivacaine 5 MG/ML  Medications comment:  Actual amount of celestone used 0.5 mL  Outcome:  Tolerated well, no immediate complications  Procedure discussed: discussed risks, benefits, and alternatives    Consent Given by:  Patient  Timeout: timeout called immediately prior to procedure    Prep: patient was prepped and draped in usual sterile fashion     Patient reported significant improvement of pain after the numbing portion right thumb steroid injection.  Aftercare instructions given to patient.  Plan to follow-up as discussed above.     Servando Haider MD [facility]              Again, thank you for allowing me to participate in the care of your patient.        Sincerely,        Servando Haider MD

## 2021-11-15 ENCOUNTER — ALLIED HEALTH/NURSE VISIT (OUTPATIENT)
Dept: FAMILY MEDICINE | Facility: CLINIC | Age: 56
End: 2021-11-15
Payer: COMMERCIAL

## 2021-11-15 VITALS — SYSTOLIC BLOOD PRESSURE: 132 MMHG | DIASTOLIC BLOOD PRESSURE: 80 MMHG | HEART RATE: 50 BPM

## 2021-11-15 DIAGNOSIS — I10 BENIGN ESSENTIAL HYPERTENSION: Primary | ICD-10-CM

## 2021-11-15 PROCEDURE — 99207 PR NO CHARGE NURSE ONLY: CPT

## 2021-11-15 NOTE — PROGRESS NOTES
Emanuel Landeros is a 56 year old year old patient who comes in today for a Blood Pressure check because of new medication.  Vital Signs as repeated by RN   Patient is taking medication as prescribed  Patient is tolerating medications well.  Patient is not monitoring Blood Pressure at home.  Average readings if yes are   Current complaints: none  Disposition:  patient to continue with the same medication

## 2022-01-10 NOTE — PROGRESS NOTES
"ASSESSMENT & PLAN    Emanuel was seen today for pain.    Diagnoses and all orders for this visit:    Trigger thumb of right hand  -     Orthopedic  Referral  -     Hand / Upper Extremity Injection/Arthrocentesis: R thumb A1      This issue is acute and Worsening.    # Right Trigger Thumb: Symptoms over the past 2 to 3 months without inciting injury.  He does have triggering over the A1 pulley at the right thumb on exam.  Reviewed previous imaging showing no significant findings in the thumb.  Given this plan to treat as below follow-up if not improving.  Image Findings: Reviewed previous hand x-ray/MRI  Treatment: Activities as tolerated  Job: No restrictions  Medications/Injections: Limited tylenol/ibuprofen for pain for 1-2 weeks, right trigger thumb steroid injection  Follow-up: 3 to 4 months if symptoms return can consider repeat injection versus referral to hand surgery      Servando Haider MD  [facility]    -----  Chief Complaint   Patient presents with     Right Hand - Pain       SUBJECTIVE  Emanuel Landeros is a/an 56 year old male who is seen in consultation at the request of Hema Weaver M.D. for evaluation of right thumb pain.     The patient is seen by themselves.  The patient is Right handed    Onset: 2-3 month(s) ago. Reports insidious onset without acute precipitating event.  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     REVIEW OF SYSTEMS:  Review of Systems      OBJECTIVE:  /82   Ht 1.727 m (5' 8\")   Wt 93.4 kg (206 lb)   BMI 31.32 kg/m     General: healthy, alert and in no distress  HEENT: no " scleral icterus or conjunctival erythema  Skin: no suspicious lesions or rash. No jaundice.  CV: distal perfusion intact    Resp: normal respiratory effort without conversational dyspnea   Psych: normal mood and affect  Gait: normal steady gait with appropriate coordination and balance    Neuro: Normal light sensory exam of right upper extremity     RIGHT HAND  Inspection:    No swelling, bruising, discoloration, or obvious deformity or asymmetry  Palpation:   Carpals: normal   Metacarpals: normal   Thumb: triggering   Fingers: normal  Range of Motion: mild limitation with thumb flexion 2/2 triggering    Full active flexion and extension at MCP, PIP, and DIP joints; normal finger cascade without malrotation.  Wrist pronation, supination, and ulnar/radial deviation normal.  Strength:     limited 2/2 trigger thumb, extension full, flexion full, abduction full, adduction full, opposition full  Special Tests:    Positive: palpable triggering 1st digit    Negative: flexor digitorum superficialis testing, flexor digitorum profundus testing      RADIOLOGY:  I independently, visualized and reviewed these images with the patient  Right hand x-ray, MRI  IMPRESSION:  1. Third and fourth metacarpal head small nonspecific cysts. No other  evidence of metacarpophalangeal joint effusion, synovitis, or erosion.  2. Small foci of carpal increased signal intensity which may represent  marrow edema, or developing cysts or erosions. No midcarpal joint  effusion is seen.  3. Dorsal subcutaneous edema as above. This could be related to soft  tissue contusion but is indeterminate in etiology.     MCKINLEY ABBOTT MD    RIGHT HAND THREE VIEWS   11/20/2018 4:19 PM      HISTORY: Right hand pain.     COMPARISON: None.                                                                      IMPRESSION: Normal.     KIMBERLEY TIWARI MD  Reviewed PCP note on 11/1/21  Review of external notes as documented elsewhere in note  Review of the result(s)  of each unique test - right hand MRI and x-ray    Hand / Upper Extremity Injection/Arthrocentesis: R thumb A1    Date/Time: 11/8/2021 3:25 PM  Performed by: Servando Haider MD  Authorized by: Servando Haider MD     Indications:  Pain and therapeutic  Needle Size:  25 G  Guidance: landmark    Approach:  Volar  Condition: trigger finger    Location:  Thumb    Site:  R thumb A1  Medications:  6 mg betamethasone acet & sod phos 6 (3-3) MG/ML; 0.5 mL ropivacaine 5 MG/ML  Medications comment:  Actual amount of celestone used 0.5 mL  Outcome:  Tolerated well, no immediate complications  Procedure discussed: discussed risks, benefits, and alternatives    Consent Given by:  Patient  Timeout: timeout called immediately prior to procedure    Prep: patient was prepped and draped in usual sterile fashion     Patient reported significant improvement of pain after the numbing portion right thumb steroid injection.  Aftercare instructions given to patient.  Plan to follow-up as discussed above.     Servando Haider MD [facility]           diminished breath sounds, R/rales

## 2022-02-08 ENCOUNTER — ANCILLARY PROCEDURE (OUTPATIENT)
Dept: GENERAL RADIOLOGY | Facility: CLINIC | Age: 57
End: 2022-02-08
Attending: FAMILY MEDICINE
Payer: COMMERCIAL

## 2022-02-08 ENCOUNTER — OFFICE VISIT (OUTPATIENT)
Dept: FAMILY MEDICINE | Facility: CLINIC | Age: 57
End: 2022-02-08
Payer: COMMERCIAL

## 2022-02-08 VITALS
SYSTOLIC BLOOD PRESSURE: 142 MMHG | RESPIRATION RATE: 18 BRPM | DIASTOLIC BLOOD PRESSURE: 86 MMHG | HEART RATE: 60 BPM | WEIGHT: 209 LBS | TEMPERATURE: 98.3 F | BODY MASS INDEX: 31.67 KG/M2 | HEIGHT: 68 IN

## 2022-02-08 DIAGNOSIS — M54.16 LUMBAR RADICULOPATHY: ICD-10-CM

## 2022-02-08 DIAGNOSIS — M54.16 LUMBAR RADICULOPATHY: Primary | ICD-10-CM

## 2022-02-08 PROCEDURE — 99213 OFFICE O/P EST LOW 20 MIN: CPT | Performed by: FAMILY MEDICINE

## 2022-02-08 PROCEDURE — 72100 X-RAY EXAM L-S SPINE 2/3 VWS: CPT | Performed by: RADIOLOGY

## 2022-02-08 ASSESSMENT — PAIN SCALES - GENERAL: PAINLEVEL: MODERATE PAIN (4)

## 2022-02-08 ASSESSMENT — MIFFLIN-ST. JEOR: SCORE: 1762.52

## 2022-02-08 NOTE — PATIENT INSTRUCTIONS
Patient Education     Back Care Tips     Caring for your back  These are things you can do to prevent a recurrence of acute back pain and to reduce symptoms from chronic back pain:    Stay at a healthy weight. If you are overweight, losing weight will help most types of back pain.    Exercise is an important part of recovery from most types of back pain. The muscles behind and in front of the spine support the back. This means strengthening both the back muscles and the abdominal muscles will provide better support for your spine.     Swimming and brisk walking are good overall exercises to improve your fitness level.    Practice safe lifting methods (see below).    Practice good posture when sitting, standing, and walking. Don't sit for a long time. This puts more stress on the lower back than standing or walking.    Wear quality shoes with good arch support. Foot and ankle alignment can affect back symptoms. Don't wear high heels.    Therapeutic massage can help relax the back muscles without stretching them.    During the first 24 to 72 hours after an acute injury or flare-up of chronic back pain, put an ice pack on the painful area for 20 minutes and then remove it for 20 minutes. Do thisover a period of 60 to 90 minutes, or several times a day. As a safety precaution, don't use a heating pad at bedtime. Sleeping on a heating pad can lead to skin burns or tissue damage.    You can alternate using ice and heat.  Medicines  Talk with your healthcare provider before using medicines, especially if you have other health problems or are taking other medicines.    You may use over-the-counter medicines, such as acetaminophen, ibuprofen, or naprosyn to control pain, unless your healthcare provider prescribed other pain medicine. Talk with your healthcare provider before taking any medicines if you have a chronic condition such as diabetes, liver or kidney disease, stomach ulcers, or digestive bleeding, or are taking  blood thinners.    Be careful if you are given prescription pain medicines, opioids, or medicine for muscle spasm. They can cause drowsiness, and affect your coordination, reflexes, and judgment. Don't drive or operate heavy machinery while taking these types of medicines. Take prescription pain medicine only as prescribed by your healthcare provider.  Lumbar stretch  This simple stretch will help relax muscle spasm and keep your back more limber. If exercise makes your back pain worse, don t do it.    Lie on your back with your knees bent and both feet on the ground.    Slowly raise your left knee to your chest as you flatten your lower back against the floor. Hold for 5 seconds.    Relax and repeat the exercise with your right knee.    Do 10 of these exercises for each leg.  Safe lifting method    Don t bend over at the waist to lift an object off the floor.  Instead, bend your knees and hips in a squat.     Keep your back and head upright    Hold the object close to your body, directly in front of you.    Straighten your legs to lift the object.     Lower the object to the floor in the reverse fashion.    If you must slide something across the floor, push it.    Posture tips  Sitting  Sit in chairs with straight backs or low-back support. Keep your knees lower than your hips, with your feet flat on the floor.  When driving, sit up straight. Adjust the seat forward so you are not leaning toward the steering wheel.  A small pillow or rolled towel behind your lower back may help if you are driving long distances.   Standing  When standing for long periods, shift most of your weight to one leg at a time. Switch legs every few minutes.   Sleeping  The best way to sleep is on your side with your knees bent. Put a low pillow under your head to support your neck in a neutral spine position. Don't use thick pillows that bend your neck to one side. Put a pillow between your legs to further relax your lower back. If you  sleep on your back, put pillows under your knees to support your legs in a slightly flexed position. Use a firm mattress. If your mattress sags, replace it, or use a 1/2-inch plywood board under the mattress to add support.  Follow-up care  Follow up with your healthcare provider, or as advised.  If X-rays, a CT scan or an MRI scan were taken, they may be reviewed by a radiologist. You will be told of any new findings that may affect your care.  Call 911  Call 911 if any of the following occur:    Trouble breathing    Confusion    Very drowsy    Fainting or loss of consciousness    Rapid or very slow heart rate    Loss of  bowel or bladder control  When to seek medical advice  Call your healthcare provider right away if any of the following occur:    Pain becomes worse or spreads to your arms or legs    Weakness or numbness in one or both arms or legs    Numbness in the groin area  [employer] last reviewed this educational content on 11/1/2019 2000-2021 [employer]. All rights reserved. This information is not intended as a substitute for professional medical care. Always follow your healthcare professional's instructions.

## 2022-02-08 NOTE — PROGRESS NOTES
Assessment & Plan     Lumbar radiculopathy  Differentials discussed in detail including lumbar radiculopathy.  Treatment options reviewed.  X-ray lumbar spine ordered and suggested physical therapy, over-the-counter oral/topical analgesia.  Orthopedic referral placed for further review and recommendations.  All questions answered.  - Orthopedic  Referral; Future  - Physical Therapy Referral; Future  - XR Lumbar Spine 2/3 Views; Future         Patient Instructions     Patient Education     Back Care Tips     Caring for your back  These are things you can do to prevent a recurrence of acute back pain and to reduce symptoms from chronic back pain:    Stay at a healthy weight. If you are overweight, losing weight will help most types of back pain.    Exercise is an important part of recovery from most types of back pain. The muscles behind and in front of the spine support the back. This means strengthening both the back muscles and the abdominal muscles will provide better support for your spine.     Swimming and brisk walking are good overall exercises to improve your fitness level.    Practice safe lifting methods (see below).    Practice good posture when sitting, standing, and walking. Don't sit for a long time. This puts more stress on the lower back than standing or walking.    Wear quality shoes with good arch support. Foot and ankle alignment can affect back symptoms. Don't wear high heels.    Therapeutic massage can help relax the back muscles without stretching them.    During the first 24 to 72 hours after an acute injury or flare-up of chronic back pain, put an ice pack on the painful area for 20 minutes and then remove it for 20 minutes. Do thisover a period of 60 to 90 minutes, or several times a day. As a safety precaution, don't use a heating pad at bedtime. Sleeping on a heating pad can lead to skin burns or tissue damage.    You can alternate using ice and heat.  Medicines  Talk with your  healthcare provider before using medicines, especially if you have other health problems or are taking other medicines.    You may use over-the-counter medicines, such as acetaminophen, ibuprofen, or naprosyn to control pain, unless your healthcare provider prescribed other pain medicine. Talk with your healthcare provider before taking any medicines if you have a chronic condition such as diabetes, liver or kidney disease, stomach ulcers, or digestive bleeding, or are taking blood thinners.    Be careful if you are given prescription pain medicines, opioids, or medicine for muscle spasm. They can cause drowsiness, and affect your coordination, reflexes, and judgment. Don't drive or operate heavy machinery while taking these types of medicines. Take prescription pain medicine only as prescribed by your healthcare provider.  Lumbar stretch  This simple stretch will help relax muscle spasm and keep your back more limber. If exercise makes your back pain worse, don t do it.    Lie on your back with your knees bent and both feet on the ground.    Slowly raise your left knee to your chest as you flatten your lower back against the floor. Hold for 5 seconds.    Relax and repeat the exercise with your right knee.    Do 10 of these exercises for each leg.  Safe lifting method    Don t bend over at the waist to lift an object off the floor.  Instead, bend your knees and hips in a squat.     Keep your back and head upright    Hold the object close to your body, directly in front of you.    Straighten your legs to lift the object.     Lower the object to the floor in the reverse fashion.    If you must slide something across the floor, push it.    Posture tips  Sitting  Sit in chairs with straight backs or low-back support. Keep your knees lower than your hips, with your feet flat on the floor.  When driving, sit up straight. Adjust the seat forward so you are not leaning toward the steering wheel.  A small pillow or rolled  towel behind your lower back may help if you are driving long distances.   Standing  When standing for long periods, shift most of your weight to one leg at a time. Switch legs every few minutes.   Sleeping  The best way to sleep is on your side with your knees bent. Put a low pillow under your head to support your neck in a neutral spine position. Don't use thick pillows that bend your neck to one side. Put a pillow between your legs to further relax your lower back. If you sleep on your back, put pillows under your knees to support your legs in a slightly flexed position. Use a firm mattress. If your mattress sags, replace it, or use a 1/2-inch plywood board under the mattress to add support.  Follow-up care  Follow up with your healthcare provider, or as advised.  If X-rays, a CT scan or an MRI scan were taken, they may be reviewed by a radiologist. You will be told of any new findings that may affect your care.  Call 911  Call 911 if any of the following occur:    Trouble breathing    Confusion    Very drowsy    Fainting or loss of consciousness    Rapid or very slow heart rate    Loss of  bowel or bladder control  When to seek medical advice  Call your healthcare provider right away if any of the following occur:    Pain becomes worse or spreads to your arms or legs    Weakness or numbness in one or both arms or legs    Numbness in the groin area  [employer] last reviewed this educational content on 11/1/2019 2000-2021 [employer]. All rights reserved. This information is not intended as a substitute for professional medical care. Always follow your healthcare professional's instructions.                 Hema Weaver MD  [facility]    Matias Castellanos is a 56 year old who presents for the following health issues     HPI     Back Pain  Onset/Duration: about a year- lots of sciatic- worse the last month    Description:   Location of pain: low back left, sciatic -  "left   Character of pain: sharp  Pain radiation: radiates into the left buttocks and radiates into the left leg  New numbness or weakness in legs, not attributed to pain: YES- every once in awhile has numbness in left foot   Intensity: moderate  Progression of Symptoms: worsening  History:   Specific cause: none  Pain interferes with job:  no   History of back problems: no prior back problems  Any previous MRI or X-rays: None  Sees a specialist for back pain: No  Alleviating factors:   Improved by: stretching helps a little bit     Precipitating factors:  Worsened by: Sitting   Therapies tried and outcome: cold, heat, rest and stretch    Accompanying Signs & Symptoms:  Risk of Fracture: None  Risk of Cauda Equina: None  Risk of Infection: None  Risk of Cancer: None  Risk of Ankylosing Spondylitis: Onset at age <35, male, AND morning back stiffness no      Review of Systems   Constitutional, HEENT, cardiovascular, pulmonary, GI, , musculoskeletal, neuro, skin, endocrine and psych systems are negative, except as otherwise noted.      Objective    BP (!) 142/86 (Cuff Size: Adult Regular)   Pulse 60   Temp 98.3  F (36.8  C) (Tympanic)   Resp 18   Ht 1.727 m (5' 8\")   Wt 94.8 kg (209 lb)   BMI 31.78 kg/m    Body mass index is 31.78 kg/m .  Physical Exam   GENERAL: alert and no distress  EYES: Eyes grossly normal to inspection, PERRL and conjunctivae and sclerae normal  HENT: normal cephalic/atraumatic, nose and mouth without ulcers or lesions, oropharynx clear and oral mucous membranes moist  NECK: no adenopathy, no asymmetry, masses, or scars and thyroid normal to palpation  RESP: lungs clear to auscultation - no rales, rhonchi or wheezes  CV: regular rates and rhythm, normal S1 S2, no S3 or S4 and no murmur, click or rub  ABDOMEN: soft, nontender  MS: no spinal/paraspinal tenderness, skin discoloration or swelling noted, normal upper and lower extremity strength, SLR negative bilaterally, pedal pulses 3+, " normal gait  SKIN: no suspicious lesions or rashes  NEURO: Normal strength and tone, mentation intact and speech normal  PSYCH: mentation appears normal, affect normal/bright

## 2022-02-08 NOTE — NURSING NOTE
"Chief Complaint   Patient presents with     Musculoskeletal Problem     BP (!) 142/86 (Cuff Size: Adult Regular)   Pulse 60   Temp 98.3  F (36.8  C) (Tympanic)   Resp 18   Ht 1.727 m (5' 8\")   Wt 94.8 kg (209 lb)   BMI 31.78 kg/m   Estimated body mass index is 31.78 kg/m  as calculated from the following:    Height as of this encounter: 1.727 m (5' 8\").    Weight as of this encounter: 94.8 kg (209 lb).  Patient presents to the clinic using No DME      Health Maintenance that is potentially due pending provider review:    Health Maintenance Due   Topic Date Due     PREVENTIVE CARE VISIT  Never done     ANNUAL REVIEW OF HM ORDERS  Never done     ADVANCE CARE PLANNING  Never done     HIV SCREENING  Never done     HEPATITIS C SCREENING  Never done     LIPID  Never done     ZOSTER IMMUNIZATION (1 of 2) Never done     COLORECTAL CANCER SCREENING  07/20/2020     INFLUENZA VACCINE (1) 09/01/2021                "

## 2022-02-19 ENCOUNTER — HEALTH MAINTENANCE LETTER (OUTPATIENT)
Age: 57
End: 2022-02-19

## 2022-02-22 ENCOUNTER — HOSPITAL ENCOUNTER (OUTPATIENT)
Dept: PHYSICAL THERAPY | Facility: CLINIC | Age: 57
Setting detail: THERAPIES SERIES
End: 2022-02-22
Attending: FAMILY MEDICINE
Payer: COMMERCIAL

## 2022-02-22 DIAGNOSIS — M54.16 LUMBAR RADICULOPATHY: ICD-10-CM

## 2022-02-22 PROCEDURE — 97140 MANUAL THERAPY 1/> REGIONS: CPT | Mod: GP | Performed by: PHYSICAL THERAPIST

## 2022-02-22 PROCEDURE — 97161 PT EVAL LOW COMPLEX 20 MIN: CPT | Mod: GP | Performed by: PHYSICAL THERAPIST

## 2022-02-22 PROCEDURE — 97110 THERAPEUTIC EXERCISES: CPT | Mod: GP | Performed by: PHYSICAL THERAPIST

## 2022-02-22 NOTE — PROGRESS NOTES
02/22/22 1500   General Information   Type of Visit Initial OP Ortho PT Evaluation   Start of Care Date 02/22/22   Referring Physician Hema Weaver MD    Patient/Family Goals Statement reduce pain    Orders Evaluate and Treat   Date of Order 02/08/22   Certification Required? No   Medical Diagnosis Lumbar radiculopathy M54.16  - Primary   Surgical/Medical history reviewed Yes   Precautions/Limitations no known precautions/limitations   General Information Comments PMH: surgery on C6-C7    Body Part(s)   Body Part(s) Lumbar Spine/SI   Presentation and Etiology   Pertinent history of current problem (include personal factors and/or comorbidities that impact the POC) Pt relates sciatic nerve pain on L off and on however in last couple months has had pain more constant with sitting. Pt relates pain is mild and can be taken away with OTC meds. Pt relates has no issues lifting, wealking, etc.    Impairments A. Pain;K. Numbness;L. Tingling   Functional Limitations perform activities of daily living   Symptom Location R low back pain   How/Where did it occur From insidious onset   Chronicity Chronic   Pain rating (0-10 point scale) Best (/10);Worst (/10)   Best (/10) 0   Worst (/10) 4   Pain quality C. Aching   Frequency of pain/symptoms A. Constant   Pain/symptoms exacerbated by A. Sitting   Pain/symptoms eased by I. OTC medication(s)   Progression of symptoms since onset: Worsened   Current Level of Function   Current Community Support Family/friend caregiver   Patient role/employment history A. 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    Living environment [facility]   Fall Risk Screen   Fall screen completed by PT   Have you fallen 2 or more times in the past year? No   Have you fallen and had an injury in the past year? No   Is patient a fall risk? No   Abuse Screen (yes response referral indicated)   Feels Unsafe at Home or Work/School no   Feels Threatened by Someone no    Does Anyone Try to Keep You From Having Contact with Others or Doing Things Outside Your Home? no   Physical Signs of Abuse Present no   System Outcome Measures   Outcome Measures Low Back Pain (see Oswestry and Davi)   Functional Scales   Functional Scales Other   Lumbar Spine/SI Objective Findings   Posture increased lumbar lordosis    Gait/Locomotion WNL    Flexion ROM to knees    Extension %    Pelvic Screen L posterior innominate rotation   Hip Flexion (L2) Strength 5/5   Knee Flexion Strength 5/5   Knee Extension (L3) Strength 5/5   Ankle Dorsiflexion (L4) Strength able to walk on toes and heels   SLR 60 deg B    Slump Test negative B   Planned Therapy Interventions   Planned Therapy Interventions balance training;gait training;manual therapy;joint mobilization;neuromuscular re-education;ROM;strengthening;stretching   Planned Modality Interventions   Planned Modality Interventions Hot packs;Cryotherapy;Electrical stimulation;TENS;Traction   Clinical Impression   Criteria for Skilled Therapeutic Interventions Met yes, treatment indicated   PT Diagnosis LBP   Influenced by the following impairments limited ROM, weakness, pain   Functional limitations due to impairments sitting   Clinical Presentation Stable/Uncomplicated   Clinical Presentation Rationale Pt is 56 yr old male who presetns w mild LBP w L radiaulr symptosm. pt is otherwise healthy, active and motivated to get better   Clinical Decision Making (Complexity) Low complexity   Therapy Frequency 1 time/week   Predicted Duration of Therapy Intervention (days/wks) 6 weeks   Risk & Benefits of therapy have been explained Yes   Patient, Family & other staff in agreement with plan of care Yes   Education Assessment   Preferred Learning Style Listening;Reading;Demonstration;Pictures/video   Barriers to Learning No barriers   ORTHO GOALS   PT Ortho Eval Goals 1;2;3;4   Ortho Goal 1   Goal Identifier Sitting   Goal Description Pt will be able to sit 60  min to be able to sit at desk   Target Date 03/15/22   Ortho Goal 2   Goal Identifier Driving    Goal Description Pt will relate 0/10 pain w driving   Target Date 03/15/22   Ortho Goal 3   Goal Identifier HEP   Goal Description Pt will be compliant and competent in HEP to allow them to achieve maximal strength and reduce pain    Target Date 04/05/22   Total Evaluation Time   PT Eval, Low Complexity Minutes (18064) 20

## 2022-03-16 ENCOUNTER — HOSPITAL ENCOUNTER (OUTPATIENT)
Dept: PHYSICAL THERAPY | Facility: CLINIC | Age: 57
Setting detail: THERAPIES SERIES
Discharge: HOME | End: 2022-03-16
Attending: FAMILY MEDICINE
Payer: COMMERCIAL

## 2022-03-16 PROCEDURE — 97110 THERAPEUTIC EXERCISES: CPT | Mod: GP | Performed by: PHYSICAL THERAPIST

## 2022-05-16 DIAGNOSIS — I10 BENIGN ESSENTIAL HYPERTENSION: ICD-10-CM

## 2022-05-17 RX ORDER — LOSARTAN POTASSIUM 25 MG/1
TABLET ORAL
Qty: 90 TABLET | Refills: 0 | Status: SHIPPED | OUTPATIENT
Start: 2022-05-17 | End: 2022-09-16

## 2022-07-14 ENCOUNTER — OFFICE VISIT (OUTPATIENT)
Dept: URGENT CARE | Facility: URGENT CARE | Age: 57
End: 2022-07-14
Payer: COMMERCIAL

## 2022-07-14 ENCOUNTER — OFFICE VISIT (OUTPATIENT)
Dept: FAMILY MEDICINE | Facility: CLINIC | Age: 57
End: 2022-07-14

## 2022-07-14 VITALS
TEMPERATURE: 98.3 F | DIASTOLIC BLOOD PRESSURE: 82 MMHG | WEIGHT: 199 LBS | SYSTOLIC BLOOD PRESSURE: 140 MMHG | HEART RATE: 46 BPM | BODY MASS INDEX: 30.26 KG/M2 | OXYGEN SATURATION: 98 %

## 2022-07-14 VITALS
SYSTOLIC BLOOD PRESSURE: 122 MMHG | HEART RATE: 68 BPM | RESPIRATION RATE: 12 BRPM | BODY MASS INDEX: 30.26 KG/M2 | WEIGHT: 199 LBS | TEMPERATURE: 98.6 F | DIASTOLIC BLOOD PRESSURE: 72 MMHG

## 2022-07-14 DIAGNOSIS — F43.23 SITUATIONAL MIXED ANXIETY AND DEPRESSIVE DISORDER: Primary | ICD-10-CM

## 2022-07-14 DIAGNOSIS — F32.A DEPRESSION, UNSPECIFIED DEPRESSION TYPE: ICD-10-CM

## 2022-07-14 DIAGNOSIS — F41.9 ANXIETY: Primary | ICD-10-CM

## 2022-07-14 DIAGNOSIS — F41.1 GAD (GENERALIZED ANXIETY DISORDER): ICD-10-CM

## 2022-07-14 PROCEDURE — 99214 OFFICE O/P EST MOD 30 MIN: CPT | Performed by: NURSE PRACTITIONER

## 2022-07-14 PROCEDURE — 99207 PR NO BILLABLE SERVICE THIS VISIT: CPT | Performed by: PHYSICIAN ASSISTANT

## 2022-07-14 RX ORDER — HYDROXYZINE HYDROCHLORIDE 25 MG/1
25 TABLET, FILM COATED ORAL 3 TIMES DAILY PRN
Qty: 30 TABLET | Refills: 0 | Status: SHIPPED | OUTPATIENT
Start: 2022-07-14 | End: 2022-08-09

## 2022-07-14 ASSESSMENT — ENCOUNTER SYMPTOMS
SLEEP DISTURBANCE: 1
DYSURIA: 0
PALPITATIONS: 0
MUSCULOSKELETAL NEGATIVE: 1
HEADACHES: 0
HEMATURIA: 0
NERVOUS/ANXIOUS: 1
CONFUSION: 1
ABDOMINAL PAIN: 0
DIARRHEA: 0
CHEST TIGHTNESS: 1
FEVER: 0
HYPERACTIVE: 0
FREQUENCY: 0
CONSTITUTIONAL NEGATIVE: 1
CARDIOVASCULAR NEGATIVE: 1
COUGH: 0
SORE THROAT: 0
VOMITING: 0
NAUSEA: 0
DECREASED CONCENTRATION: 1
NEUROLOGICAL NEGATIVE: 1
HALLUCINATIONS: 0
MYALGIAS: 0
GASTROINTESTINAL NEGATIVE: 1
ALLERGIC/IMMUNOLOGIC NEGATIVE: 1
CHILLS: 0
SHORTNESS OF BREATH: 0
WHEEZING: 0

## 2022-07-14 ASSESSMENT — ANXIETY QUESTIONNAIRES
1. FEELING NERVOUS, ANXIOUS, OR ON EDGE: NEARLY EVERY DAY
IF YOU CHECKED OFF ANY PROBLEMS ON THIS QUESTIONNAIRE, HOW DIFFICULT HAVE THESE PROBLEMS MADE IT FOR YOU TO DO YOUR WORK, TAKE CARE OF THINGS AT HOME, OR GET ALONG WITH OTHER PEOPLE: VERY DIFFICULT
3. WORRYING TOO MUCH ABOUT DIFFERENT THINGS: NEARLY EVERY DAY
GAD7 TOTAL SCORE: 21
GAD7 TOTAL SCORE: 21
7. FEELING AFRAID AS IF SOMETHING AWFUL MIGHT HAPPEN: NEARLY EVERY DAY
2. NOT BEING ABLE TO STOP OR CONTROL WORRYING: NEARLY EVERY DAY
6. BECOMING EASILY ANNOYED OR IRRITABLE: NEARLY EVERY DAY
5. BEING SO RESTLESS THAT IT IS HARD TO SIT STILL: NEARLY EVERY DAY

## 2022-07-14 ASSESSMENT — PAIN SCALES - GENERAL: PAINLEVEL: NO PAIN (0)

## 2022-07-14 ASSESSMENT — PATIENT HEALTH QUESTIONNAIRE - PHQ9
5. POOR APPETITE OR OVEREATING: NEARLY EVERY DAY
SUM OF ALL RESPONSES TO PHQ QUESTIONS 1-9: 21

## 2022-07-14 NOTE — COMMUNITY RESOURCES LIST (ENGLISH)
07/14/2022   [facility] - Outpatient Clinics  N/A  For questions about this resource list or additional care needs, please contact your primary care clinic or care manager.  Phone: [phone]   Email: N/A   Address: [address] [place] [id number]   Hours: N/A        Hotlines and Helplines       Hotline - Crisis help  1  [facility] Mobile Crisis Services - Mobile Crisis Response Distance: 21.76 miles      COVID-19 Status: Phone/Virtual   [address] [facility] [place] [address]  Language: English  Hours: Mon - Sun Open 24 Hours  Fees: Insurance   Phone: (380) 829-9527 Email: info@[employer] Website: [email or web address]://www.[email or web address]/location/wguhspxnl-szxluj-yoxwvl-services-[email or web address]/          Mental Health       Individual counseling  2  [facility] Distance: 0.17 miles      COVID-19 Status: Regular Operations, COVID-19 Status: Phone/Virtual   [address] [place] [address]  Language: English  Hours: Mon - Fri 8:00 AM - 5:00 PM  Fees: Insurance, Self Pay   Phone: (515) [phone] Email: info@[employer] Website: https://[employer]/     3  [facility] Distance: 0.82 miles      COVID-19 Status: Regular Operations, COVID-19 Status: Phone/Virtual   [address] [place] [address]  Language: English  Hours: Mon - Thu 7:00 AM - 5:00 PM , Fri 7:00 AM - 3:30 PM  Fees: Insurance, Self Pay, Sliding Fee   Phone: (819) [phone] Email: [email or web address]@[email or web address] Website: [email or web address]/     Mental health crisis care  4  [facility] - Crisis Assessment and Stabilization Services Distance: 20.98 miles      COVID-19 Status: Regular Operations, COVID-19 Status: Phone/Virtual   [address] [place] [address]  Language: English  Hours: Mon - Sun Open 24 Hours  Fees: Insurance, Self Pay, Sliding Fee   Phone: (647) [phone] Email: info@[employer]  Website: [email or web address]://[email or web address].[email or web address]/location/[place]-branch/          Important Numbers & Websites       Emergency Services   911  [facility] Services   311  Poison Control   (862) 324-5195  Suicide Prevention Lifeline   (400) 179-2356 (TALK)  Child Abuse Hotline   (202) 439-2068 (4-A-Child)  Sexual Assault Hotline   (580) 543-2736 (HOPE)  National Runaway Safeline   (816) 970-5282 (RUNAWAY)  All-Options Talkline   (729) 920-5911  Substance Abuse Referral   (137) 284-1777 (HELP)

## 2022-07-14 NOTE — PROGRESS NOTES
"  Assessment & Plan     Situational mixed anxiety and depressive disorder  The risks, benefits and treatment options of prescribed medications or other treatments have been discussed with the patient. The patient verbalized their understanding and should call or follow up if no improvement or if they develop further problems.    - sertraline (ZOLOFT) 50 MG tablet  Dispense: 34 tablet; Refill: 0  - [facility] Referral    JACQUELINE (generalized anxiety disorder)     - sertraline (ZOLOFT) 50 MG tablet  Dispense: 34 tablet; Refill: 0  - [facility] Referral          BMI:   Estimated body mass index is 30.26 kg/m  as calculated from the following:    Height as of 2/8/22: 1.727 m (5' 8\").    Weight as of this encounter: 90.3 kg (199 lb).   Weight management plan: Patient was referred to their PCP to discuss a diet and exercise plan.    See Patient Instructions    No follow-ups on file.    Bing Hidalgo NP  [facility] is a 56 year old accompanied by his wife, presenting for the following health issues:  UC Follow-Up      HPI     ED/UC Followup:    Facility:  [facility]  Date of visit: 7/14/22  Reason for visit: anxiety, depression  Current Status: feeling better since the urgent care visit    Depression and Anxiety Follow-Up    How are you doing with your depression since your last visit? Worsened with job change and stressors    How are you doing with your anxiety since your last visit?  Worsened with anxiety/panic.    Are you having other symptoms that might be associated with depression or anxiety? Yes:  see above - irritability and anger    Have you had a significant life event? Job Concerns     Do you have any concerns with your use of alcohol or other drugs? Yes:  see below    Drinking beer daily (6-8 per day) - 1-2 cases per week to help him sleep and with anxiety.  No DUI - does not feel like this I a " problem.  Marijuana smoking daily at bedtime for sleep/anxiety.    FH+ mom with anxiety/depression, dad with depression - unknown  Works for custodial system.    Has 2 children 14 and 12 year old.  Has never been on anxiety/depression medication.  No personal history of anxiety/depression.    Sleeping 4 hours on average at night - can get to sleep - waking up frequently - hard to get back to sleep.      Social History     Tobacco Use     Smoking status: Former Smoker     Packs/day: 0.00     Quit date: 2003     Years since quittin.0     Smokeless tobacco: Never Used   Vaping Use     Vaping Use: Never used   Substance Use Topics     Alcohol use: Yes     Drug use: Yes     Types: Marijuana     Comment: daniella     No flowsheet data found.  No flowsheet data found.  No flowsheet data found.  No flowsheet data found.    Suicide Assessment Five-step Evaluation and Treatment (SAFE-T)       How many servings of fruits and vegetables do you eat daily?  2-3    On average, how many sweetened beverages do you drink each day (Examples: soda, juice, sweet tea, etc.  Do NOT count diet or artificially sweetened beverages)?   0    How many days per week do you exercise enough to make your heart beat faster? 3 or less    How many minutes a day do you exercise enough to make your heart beat faster? 10 - 19    How many days per week do you miss taking your medication? 0      Review of Systems   Constitutional, HEENT, cardiovascular, pulmonary, GI, , musculoskeletal, neuro, skin, endocrine and psych systems are negative, except as otherwise noted.      Objective    /72 (BP Location: Right arm, Patient Position: Sitting, Cuff Size: Adult Large)   Pulse 68   Temp 98.6  F (37  C) (Tympanic)   Resp 12   Wt 90.3 kg (199 lb)   BMI 30.26 kg/m    Body mass index is 30.26 kg/m .  Physical Exam   GENERAL: healthy, alert and no distress  PSYCH: mentation appears normal, affect normal/bright, tearful, anxious, fatigued and  judgement and insight intact                 .  ..

## 2022-07-14 NOTE — PROGRESS NOTES
Chief Complaint:    Chief Complaint   Patient presents with     Anxiety     On vacation this week from work, has problems with not being able to stop thinking about work.  This morning had physical symptoms, felt his heart rate increase, hard to breathe, isabella at the drop of a hat.  This has been going on for a while, hasn't seen anyone for this in the past.  Been medicating with pot and beer.     Medical Decision Making:    Vital signs reviewed by Adithya Sprague PA-C  BP (!) 140/82   Pulse (!) 46   Temp 98.3  F (36.8  C) (Tympanic)   Wt 90.3 kg (199 lb)   SpO2 98%   BMI 30.26 kg/m      Differential Diagnosis:  Depression, anxiety     ASSESSMENT:     1. Anxiety    2. Depression, unspecified depression type         PLAN:     Patient is in no acute distress.  He is afebrile with stable vital signs.    No plan of suicide or thoughts of harming himself or others.  Rx for Atarax for panic attacks.    Patient instructed to follow up with PCP.  Staff assisted him with this appointment for today or tomorrow.    Worrisome symptoms discussed with instructions to go to the ED.  Patient verbalized understanding and agreed with this plan.  43 minutes was spent in the care of this patient including chart review.  HPI, PE, review of plan, and coordination of care.      Labs:     No results found for any visits on 07/14/22.    Current Meds:    Current Outpatient Medications:      hydrOXYzine (ATARAX) 25 MG tablet, Take 1 tablet (25 mg) by mouth 3 times daily as needed for itching, Disp: 30 tablet, Rfl: 0     fluticasone (FLONASE) 50 MCG/ACT nasal spray, Use 1 spray(s) in each nostril once daily, Disp: 16 g, Rfl: 0     losartan (COZAAR) 25 MG tablet, Take 1 tablet by mouth once daily, Disp: 90 tablet, Rfl: 0     ORDER FOR DME, CPAP: 8 cm H2O  Lifetime need and heated humidity.   , Disp: 1 each, Rfl:      ZYRTEC PO, OTC AS NEEDED, Disp: , Rfl:     Allergies:  No Known Allergies    SUBJECTIVE    HPI: Emanuel Landeros is an 56  year old male who presents for evaluation and treatment of possible depression, and anxiety.  Patient is here with his wife.    Patient has been having difficulty sleeping and has had episodes of crying for some time now.  He is a  and has had some things happen at work that seem to have started the symptoms. He has had some episodes where his mind races and this causes some confusion and chest tightness.   He denies any Hx of depression or anxiety.  He has been using alcohol to try to stop his mind from racing at night so he can sleep.  He has not had any thoughts of harming himself or others.  He has not made a plan for suicide.  He is tearful at times in clinic today but answers questions appropriately.  He denies any chest pain, SOB, or palpitations.      ROS:      Review of Systems   Constitutional: Negative.  Negative for chills and fever.   HENT: Negative.  Negative for sore throat.    Respiratory: Positive for chest tightness. Negative for cough, shortness of breath and wheezing.    Cardiovascular: Negative.  Negative for chest pain and palpitations.   Gastrointestinal: Negative.  Negative for abdominal pain, diarrhea, nausea and vomiting.   Genitourinary: Negative for dysuria, frequency, hematuria and urgency.   Musculoskeletal: Negative.  Negative for myalgias.   Skin: Negative for rash.   Allergic/Immunologic: Negative.  Negative for immunocompromised state.   Neurological: Negative.  Negative for headaches.   Psychiatric/Behavioral: Positive for confusion, decreased concentration and sleep disturbance. Negative for behavioral problems, hallucinations, self-injury and suicidal ideas. The patient is nervous/anxious. The patient is not hyperactive.         Family History   Family History   Problem Relation Age of Onset     Prostate Cancer Father      Prostate Cancer Paternal Grandfather      Melanoma No family hx of        Social History  Social History     Socioeconomic History     Marital  status:      Spouse name: Not on file     Number of children: Not on file     Years of education: Not on file     Highest education level: Not on file   Occupational History     Not on file   Tobacco Use     Smoking status: Former Smoker     Packs/day: 0.00     Quit date: 2003     Years since quittin.0     Smokeless tobacco: Never Used   Substance and Sexual Activity     Alcohol use: Yes     Comment: minimal     Drug use: No     Comment: maraayleen     Sexual activity: Yes     Partners: Female   Other Topics Concern     Parent/sibling w/ CABG, MI or angioplasty before 65F 55M? Not Asked   Social History Narrative     Not on file     Social Determinants of Health     Financial Resource Strain: Not on file   Food Insecurity: Not on file   Transportation Needs: Not on file   Physical Activity: Not on file   Stress: Not on file   Social Connections: Not on file   Intimate Partner Violence: Not on file   Housing Stability: Not on file        Surgical History:  Past Surgical History:   Procedure Laterality Date     C6-C7 fusion  2006     [facility]      EXCISE MASS ABDOMEN Left 2018    Procedure: EXCISE MASS ABDOMEN;  Excision soft tissue mass left abdomen;  Surgeon: Froylan Ayala MD;  Location: [facility] OR     ORTHOPEDIC SURGERY       SOFT TISSUE SURGERY          Problem List:  Patient Active Problem List   Diagnosis     Testosterone deficiency     TERESA (obstructive sleep apnea)     Cervical disc disorder with radiculopathy           OBJECTIVE:     Vital signs noted and reviewed by Adithya Sprague PA-C  BP (!) 140/82   Pulse (!) 46   Temp 98.3  F (36.8  C) (Tympanic)   Wt 90.3 kg (199 lb)   SpO2 98%   BMI 30.26 kg/m       PEFR:    Physical Exam  Vitals and nursing note reviewed.   Constitutional:       General: He is not in acute distress.     Appearance: He is well-developed. He is not ill-appearing, toxic-appearing or diaphoretic.   HENT:      Head: Normocephalic and atraumatic.      Right Ear:  Hearing, tympanic membrane, ear canal and external ear normal. Tympanic membrane is not perforated, erythematous, retracted or bulging.      Left Ear: Hearing, tympanic membrane, ear canal and external ear normal. Tympanic membrane is not perforated, erythematous, retracted or bulging.      Nose: Nose normal. No mucosal edema, congestion or rhinorrhea.      Mouth/Throat:      Pharynx: No oropharyngeal exudate or posterior oropharyngeal erythema.      Tonsils: No tonsillar exudate or tonsillar abscesses. 0 on the right. 0 on the left.   Eyes:      Pupils: Pupils are equal, round, and reactive to light.   Cardiovascular:      Rate and Rhythm: Normal rate and regular rhythm.      Heart sounds: Normal heart sounds, S1 normal and S2 normal. Heart sounds not distant. No murmur heard.    No friction rub. No gallop.   Pulmonary:      Effort: Pulmonary effort is normal. No respiratory distress.      Breath sounds: Normal breath sounds. No decreased breath sounds, wheezing, rhonchi or rales.   Abdominal:      General: Bowel sounds are normal. There is no distension.      Palpations: Abdomen is soft.      Tenderness: There is no abdominal tenderness.   Musculoskeletal:      Cervical back: Normal range of motion and neck supple.   Lymphadenopathy:      Cervical: No cervical adenopathy.   Skin:     General: Skin is warm and dry.      Findings: No rash.   Neurological:      Mental Status: He is alert.      Cranial Nerves: No cranial nerve deficit.   Psychiatric:         Attention and Perception: Attention normal. He is attentive.         Mood and Affect: Mood is depressed. Mood is not anxious. Affect is not blunt, flat, angry or inappropriate.         Speech: Speech normal. He is communicative. Speech is not rapid and pressured, delayed or slurred.         Behavior: Behavior normal. Behavior is not aggressive, withdrawn or combative. Behavior is cooperative.         Thought Content: Thought content is not paranoid or delusional.  Thought content does not include homicidal or suicidal ideation. Thought content does not include homicidal or suicidal plan.         Judgment: Judgment normal. Judgment is not inappropriate.             Adithya Sprague PA-C  7/14/2022, 10:42 AM

## 2022-08-09 ENCOUNTER — OFFICE VISIT (OUTPATIENT)
Dept: FAMILY MEDICINE | Facility: CLINIC | Age: 57
End: 2022-08-09
Payer: COMMERCIAL

## 2022-08-09 VITALS
WEIGHT: 202.6 LBS | RESPIRATION RATE: 16 BRPM | DIASTOLIC BLOOD PRESSURE: 82 MMHG | OXYGEN SATURATION: 96 % | BODY MASS INDEX: 30.71 KG/M2 | HEART RATE: 48 BPM | TEMPERATURE: 97.8 F | SYSTOLIC BLOOD PRESSURE: 128 MMHG | HEIGHT: 68 IN

## 2022-08-09 DIAGNOSIS — Z13.220 SCREENING FOR HYPERLIPIDEMIA: ICD-10-CM

## 2022-08-09 DIAGNOSIS — F43.23 SITUATIONAL MIXED ANXIETY AND DEPRESSIVE DISORDER: ICD-10-CM

## 2022-08-09 DIAGNOSIS — F41.8 SITUATIONAL ANXIETY: ICD-10-CM

## 2022-08-09 DIAGNOSIS — F41.1 GAD (GENERALIZED ANXIETY DISORDER): Primary | ICD-10-CM

## 2022-08-09 PROCEDURE — 99213 OFFICE O/P EST LOW 20 MIN: CPT | Performed by: NURSE PRACTITIONER

## 2022-08-09 ASSESSMENT — PAIN SCALES - GENERAL: PAINLEVEL: NO PAIN (0)

## 2022-08-09 ASSESSMENT — ENCOUNTER SYMPTOMS: NERVOUS/ANXIOUS: 1

## 2022-08-09 NOTE — PROGRESS NOTES
Assessment & Plan     JACQUELINE (generalized anxiety disorder)  Improved - going to counseling.  Cut back on alcohol and stopped marijuana usage  Reviewed PHQ-9 and JACQUELINE.  See AVS.    - sertraline (ZOLOFT) 50 MG tablet  Dispense: 45 tablet; Refill: 1    Situational mixed anxiety and depressive disorder     - sertraline (ZOLOFT) 50 MG tablet  Dispense: 45 tablet; Refill: 1    Situational anxiety       Screening for hyperlipidemia             See Patient Instructions    Return in about 4 weeks (around 2022) for Anxiety/Depression Follow up.    Bing Hidalgo NP  [facility] is a 56 year old accompanied by his self, presenting for the following health issues:  Anxiety, Medication Problem (Patient stopped taking hydroxyzine due to it not helping. He also stopped taking Zoloft due to it causing diarrhea ), and Health Maintenance (Due for colonoscopy - pended)      Anxiety    History of Present Illness       Reason for visit:  Check medications    He eats 0-1 servings of fruits and vegetables daily.He exercises with enough effort to increase his heart rate 9 or less minutes per day.  He exercises with enough effort to increase his heart rate 3 or less days per week. He is missing 3 dose(s) of medications per week.       Anxiety Follow-Up    How are you doing with your anxiety since your last visit? Improved     Are you having other symptoms that might be associated with anxiety? No    Have you had a significant life event? Job Concerns     Are you feeling depressed? Yes:  at times     Do you have any concerns with your use of alcohol or other drugs? No    Social History     Tobacco Use     Smoking status: Former Smoker     Packs/day: 0.00     Quit date: 2003     Years since quittin.1     Smokeless tobacco: Never Used   Vaping Use     Vaping Use: Never used   Substance Use Topics     Alcohol use: Yes     Comment: Daily - A few Beers     Drug use: Not Currently      "Types: Marijuana     Comment: daniella     JACQUELINE-7 SCORE 7/14/2022   Total Score 21     PHQ 7/14/2022   PHQ-9 Total Score 21   Q9: Thoughts of better off dead/self-harm past 2 weeks Not at all   Cut back on drinking and marijuana since last visit.  Going to counseling.  Work is going better - less anxiety.  Sleep not improved - sleeping 5 hours waking up with mind racing/anxiety.  Has taken Ambien and Hydroxyzine.    Notes from last visit.  Drinking beer daily (6-8 per day) - 1-2 cases per week to help him sleep and with anxiety.  No DUI - does not feel like this I a problem.  Marijuana smoking daily at bedtime for sleep/anxiety.     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       Review of Systems   Psychiatric/Behavioral: The patient is nervous/anxious.       Constitutional, HEENT, cardiovascular, pulmonary, GI, , musculoskeletal, neuro, skin, endocrine and psych systems are negative, except as otherwise noted.      Objective    /82 (BP Location: Left arm, Patient Position: Chair, Cuff Size: Adult Large)   Pulse (!) 48   Temp 97.8  F (36.6  C) (Tympanic)   Resp 16   Ht 1.727 m (5' 8\")   Wt 91.9 kg (202 lb 9.6 oz)   SpO2 96%   BMI 30.81 kg/m    Body mass index is 30.81 kg/m .  Physical Exam   GENERAL: healthy, alert and no distress  RESP: lungs clear to auscultation - no rales, rhonchi or wheezes  CV: regular rate and rhythm, normal S1 S2, no S3 or S4, no murmur, click or rub, no peripheral edema and peripheral pulses strong  ABDOMEN: soft, nontender, no hepatosplenomegaly, no masses and bowel sounds normal  MS: no gross musculoskeletal defects noted, no edema  PSYCH: mentation appears normal, affect normal/bright             .  ..  "

## 2022-08-09 NOTE — PATIENT INSTRUCTIONS
Restart Zoloft at 1/2 tablet (25 mg)   Update me with symptoms and any side effects.  If still having SE - would switch to another medication.    Discussed sleeping medications (Trazodone) message if interested.    EKTA PaezP

## 2022-09-09 ENCOUNTER — VIRTUAL VISIT (OUTPATIENT)
Dept: URGENT CARE | Facility: CLINIC | Age: 57
End: 2022-09-09
Payer: COMMERCIAL

## 2022-09-09 VITALS — OXYGEN SATURATION: 97 %

## 2022-09-09 DIAGNOSIS — U07.1 CLINICAL DIAGNOSIS OF COVID-19: Primary | ICD-10-CM

## 2022-09-09 DIAGNOSIS — I10 HYPERTENSION, WELL CONTROLLED: ICD-10-CM

## 2022-09-09 DIAGNOSIS — R06.02 MILD SHORTNESS OF BREATH: ICD-10-CM

## 2022-09-09 PROCEDURE — 99213 OFFICE O/P EST LOW 20 MIN: CPT | Mod: CS

## 2022-09-09 NOTE — PATIENT INSTRUCTIONS
Paxlovid 2 x day for 5 days     No interaction with your medications    Monitor blood pressure   & oxygen level if persistently 93 or below need in person evaluation    Go to ER if breathing worsens      Instructions for Patients      What are the symptoms of COVID-19?  Symptoms can include fever, cough, shortness of breath, chills, headache, muscle pain sore throat, fatigue, runny or stuffy nose, and loss of taste and smell. Other less common symptoms include nausea, vomiting, or diarrhea (watery stools).    Know when to call 911. Emergency warning signs include:  Trouble breathing or shortness of breath  Pain or pressure in the chest that doesn't go away  Feeling confused like you haven't felt before, or not being able to wake up  Bluish-colored lips or face    How can I take care of myself?  Get lots of rest. Drink extra fluids (unless a doctor has told you not to).  Take Tylenol (acetaminophen) for fever or pain. If you have liver or kidney problems, ask your family doctor if it's okay to take Tylenol   Adults:   650 mg (two 325 mg pills or tablets) every 4 to 6 hours, or...   1,000 mg (two 500 mg pills or tablets) every 8 hours as needed.  Note: Don't take more than 3,000 mg in one day. Acetaminophen is found in many medicines (both prescribed and over the counter). Read all labels to be sure you don't take too much.  For children, check the Tylenol bottle for the right dose. The dose is based on the child's age or weight.  Take over the counter medicines for your symptoms as needed. Talk to your pharmacist.  If you have other health problems (like cancer, heart failure, an organ transplant, or severe kidney disease): Call your specialty clinic if you don't feel better in the next 2 days.    These guidelines are for isolating and quarantining before returning to work, school or .   For employers, schools and day cares: This is an official notice for this person s medical guidelines for returning  in-person.   For health care sites: The CDC gives different isolation and quarantine guidelines for healthcare sites, please check with these sites before arriving.     How do I self-isolate?  You isolate when you have symptoms of COVID or a test shows you have COVID, even if you don t have symptoms.   If you DO have symptoms:  Stay home and away from others  For at least 5 days after your symptoms started, AND   You are fever free for 24 hours (with no medicine that reduces fever), AND  Your other symptoms are better.  Wear a mask for 10 full days any time you are around others.  If you DON T have symptoms:  Stay at home and away from others for at least 5 days after your positive test.  Wear a mask for 10 full days any time you are around others.    How and when do I quarantine?  You quarantine when you may have been exposed to the virus and DON T have symptoms.   Stay home and away from others.   You must quarantine for 5 days after your last contact with a person who has COVID.  Note: If you are fully vaccinated, you don t need to quarantine. You should still follow the steps below.   Wear a mask for 10 full days any time you re around others.  Get tested at least 5 days after you were exposed, even if you don t have symptoms.   If you start to have symptoms, isolate right away and get tested.    Where can I get more information?  [facility] COVID-19 Resource Hub: www.[email or web address].org/covid19/   CDC Quarantine & Isolation: https://www.cdc.gov/coronavirus/2019-ncov/your-health/quarantine-isolation.html   CDC - What to Do If You're Sick: https://www.cdc.gov/coronavirus/2019-ncov/if-you-are-sick/index.html  [facility] clinical trials (COVID-19 research studies): clinicalaffairs.[facility]/umn-clinical-trials  [place] Department of Health COVID-19 Public Hotline: 1-902.113.5701

## 2022-09-09 NOTE — PROGRESS NOTES
"Emanuel is a 56 year old who is being evaluated via a billable video visit.      How would you like to obtain your AVS? [facility]      Assessment & Plan   Problem List Items Addressed This Visit    None     Visit Diagnoses     Clinical diagnosis of COVID-19    -  Primary    Relevant Medications    nirmatrelvir and ritonavir (PAXLOVID) therapy pack    Hypertension, well controlled        Mild shortness of breath             15-minute telephone visit.  Attempted video visit without success           COVID-19 positive patient.  Encounter for consideration of medication intervention. Patient does qualify for a prescription. Full discussion with patient including medication options, risks and benefits. Potential drug interactions reviewed with patient.     Treatment Planned Paxlovid, Rx sent  Temporary change to home medications:  None     Estimated body mass index is 30.81 kg/m  as calculated from the following:    Height as of 8/9/22: 1.727 m (5' 8\").    Weight as of 8/9/22: 91.9 kg (202 lb 9.6 oz).  GFR Estimate   Date Value Ref Range Status   05/30/2019 81 >60 mL/min/[1.73_m2] Final     Comment:     Non  GFR Calc  Starting 12/18/2018, serum creatinine based estimated GFR (eGFR) will be   calculated using the Chronic Kidney Disease Epidemiology Collaboration   (CKD-EPI) equation.       No results found for: [id number]    Return in about 3 days (around 9/12/2022) for virtual visit with primary..    Virtual Urgent Care  [facility]    Subjective   Emanuel is a 56 year old, presenting for the following health issues:  Covid Concern      HPI       COVID-19 Symptom Review  How many days ago did these symptoms start? 2 days ago 9/7  Home test positive last night  Are any of the following symptoms significant for you?  New or worsening difficulty breathing? Yes    Please describe what kind of difficulty you are having breathing:Mild dyspnea (able to do ADLs without difficulty, mild shortness " of breath with activities such as climbing one or two flights of stairs or walking briskly)    Worsening cough? Yes, I am coughing up mucus.    Fever or chills? No    Headache: No    Sore throat: YES scratchy    Chest pain: No    Diarrhea: No    Body aches? YES    Feels fatigued    What treatments has patient tried? vitamines   Does patient live in a nursing home, group home, or shelter? No  Does patient have a way to get food/medications during quarantined? Yes, I have a friend or family member who can help me.      COVID-19  03/31/2021  1 of 2  Levels Beyond COVID-19 Vaccine  Full    No    COVID-19  11/24/2021  2 of 2  Spikevax-MOD 100mcg  Full               Patient Active Problem List   Diagnosis     Testosterone deficiency     TERESA (obstructive sleep apnea)     Cervical disc disorder with radiculopathy     Situational anxiety     Current Outpatient Medications   Medication Sig Dispense Refill     nirmatrelvir and ritonavir (PAXLOVID) therapy pack Take 3 tablets by mouth 2 times daily for 5 days (Take 2 Nirmatrelvir tablets and 1 Ritonavir tablet twice daily for 5 days) 30 each 0     fluticasone (FLONASE) 50 MCG/ACT nasal spray Use 1 spray(s) in each nostril once daily 16 g 0     losartan (COZAAR) 25 MG tablet Take 1 tablet by mouth once daily 90 tablet 0     ORDER FOR DME CPAP:  8 cm H2O    Lifetime need and heated humidity.      1 each      sertraline (ZOLOFT) 50 MG tablet Take 0.5 tablets (25 mg) by mouth daily 45 tablet 1     ZYRTEC PO OTC AS NEEDED           Review of Systems         Objective           Vitals:  No vitals were obtained today due to virtual visit.    Physical Exam   GENERAL: alert and no distress.  Able to speak in full sentences  RESP: No audible wheeze, cough  PSYCH: Mentation appears normal, affect normal/bright, judgement and insight intact, normal speech

## 2022-09-16 DIAGNOSIS — I10 BENIGN ESSENTIAL HYPERTENSION: ICD-10-CM

## 2022-09-16 RX ORDER — LOSARTAN POTASSIUM 25 MG/1
TABLET ORAL
Qty: 90 TABLET | Refills: 0 | Status: SHIPPED | OUTPATIENT
Start: 2022-09-16 | End: 2023-02-09

## 2022-09-16 NOTE — TELEPHONE ENCOUNTER
"Requested Prescriptions   Pending Prescriptions Disp Refills     losartan (COZAAR) 25 MG tablet [Pharmacy Med Name: Losartan Potassium 25 MG Oral Tablet] 90 tablet 0     Sig: Take 1 tablet by mouth once daily       Angiotensin-II Receptors Failed - 9/16/2022  7:14 AM        Failed - Normal serum creatinine on file in past 12 months     Recent Labs   Lab Test 05/30/19  1550   CR 1.05       Ok to refill medication if creatinine is low          Failed - Normal serum potassium on file in past 12 months     No lab results found.                 Passed - Last blood pressure under 140/90 in past 12 months     BP Readings from Last 3 Encounters:   08/09/22 128/82   07/14/22 122/72   07/14/22 (!) 140/82                 Passed - Recent (12 mo) or future (30 days) visit within the authorizing provider's specialty     Patient has had an office visit with the authorizing provider or a provider within the authorizing providers department within the previous 12 mos or has a future within next 30 days. See \"Patient Info\" tab in inbasket, or \"Choose Columns\" in Meds & Orders section of the refill encounter.              Passed - Medication is active on med list        Passed - Patient is age 18 or older             "

## 2022-09-21 NOTE — PROGRESS NOTES
[facility]    Outpatient Physical Therapy Discharge Note  Patient: Emanuel Landeros  : 1965    Beginning/End Dates of Reporting Period:  22 to 3/16/22    Referring Provider: Meg    Therapy Diagnosis: LBP     Client Self Report: Pt relats doing a lot better. Only getting pain a few times a week vs daily. Sitting on soft couch the worst    Objective Measurements:  Objective Measure: lumbar ROM               Goals:  Goal Identifier Sitting   Goal Description Pt will be able to sit 60 min to be able to sit at desk   Target Date 03/15/22   Date Met      Progress (detail required for progress note): sitting on couch      Goal Identifier Driving    Goal Description Pt will relate 0/10 pain w driving   Target Date 03/15/22   Date Met  22   Progress (detail required for progress note):  goal met     Goal Identifier HEP   Goal Description Pt will be compliant and competent in HEP to allow them to achieve maximal strength and reduce pain    Target Date 22   Date Met      Progress (detail required for progress note):  goal met             Pt is progressing well thus progressed HEP and pt is to trial at home. If pt does not return with in 4 weeks, will be d/c to HEP    Plan:  Discharge from therapy.    Discharge:    Reason for Discharge: Can continue to make gains with HEP at home    Equipment Issued: NA    Discharge Plan: Patient to continue home program.

## 2022-09-25 ENCOUNTER — MYC MEDICAL ADVICE (OUTPATIENT)
Dept: FAMILY MEDICINE | Facility: CLINIC | Age: 57
End: 2022-09-25

## 2022-10-22 ENCOUNTER — HEALTH MAINTENANCE LETTER (OUTPATIENT)
Age: 57
End: 2022-10-22

## 2023-03-25 DIAGNOSIS — I10 BENIGN ESSENTIAL HYPERTENSION: ICD-10-CM

## 2023-03-27 RX ORDER — LOSARTAN POTASSIUM 25 MG/1
TABLET ORAL
Qty: 30 TABLET | Refills: 0 | Status: SHIPPED | OUTPATIENT
Start: 2023-03-27 | End: 2023-04-13

## 2023-04-01 ENCOUNTER — HEALTH MAINTENANCE LETTER (OUTPATIENT)
Age: 58
End: 2023-04-01

## 2023-04-13 ENCOUNTER — OFFICE VISIT (OUTPATIENT)
Dept: FAMILY MEDICINE | Facility: CLINIC | Age: 58
End: 2023-04-13
Payer: COMMERCIAL

## 2023-04-13 VITALS
DIASTOLIC BLOOD PRESSURE: 90 MMHG | WEIGHT: 204 LBS | BODY MASS INDEX: 30.92 KG/M2 | SYSTOLIC BLOOD PRESSURE: 150 MMHG | HEIGHT: 68 IN | RESPIRATION RATE: 18 BRPM | HEART RATE: 48 BPM | TEMPERATURE: 97.7 F | OXYGEN SATURATION: 98 %

## 2023-04-13 DIAGNOSIS — M19.90 INFLAMMATORY ARTHRITIS: Primary | ICD-10-CM

## 2023-04-13 DIAGNOSIS — I10 BENIGN ESSENTIAL HYPERTENSION: ICD-10-CM

## 2023-04-13 DIAGNOSIS — Z12.11 COLON CANCER SCREENING: ICD-10-CM

## 2023-04-13 LAB
ANION GAP SERPL CALCULATED.3IONS-SCNC: 13 MMOL/L (ref 7–15)
BUN SERPL-MCNC: 21.6 MG/DL (ref 6–20)
CALCIUM SERPL-MCNC: 9.6 MG/DL (ref 8.6–10)
CHLORIDE SERPL-SCNC: 107 MMOL/L (ref 98–107)
CREAT SERPL-MCNC: 1.47 MG/DL (ref 0.67–1.17)
CRP SERPL-MCNC: <3 MG/L
DEPRECATED HCO3 PLAS-SCNC: 22 MMOL/L (ref 22–29)
ERYTHROCYTE [DISTWIDTH] IN BLOOD BY AUTOMATED COUNT: 12.1 % (ref 10–15)
ERYTHROCYTE [SEDIMENTATION RATE] IN BLOOD BY WESTERGREN METHOD: 8 MM/HR (ref 0–20)
GFR SERPL CREATININE-BSD FRML MDRD: 56 ML/MIN/1.73M2
GLUCOSE SERPL-MCNC: 101 MG/DL (ref 70–99)
HCT VFR BLD AUTO: 40.5 % (ref 40–53)
HGB BLD-MCNC: 13.6 G/DL (ref 13.3–17.7)
MCH RBC QN AUTO: 30.7 PG (ref 26.5–33)
MCHC RBC AUTO-ENTMCNC: 33.6 G/DL (ref 31.5–36.5)
MCV RBC AUTO: 91 FL (ref 78–100)
PLATELET # BLD AUTO: 267 10E3/UL (ref 150–450)
POTASSIUM SERPL-SCNC: 4.3 MMOL/L (ref 3.4–5.3)
RBC # BLD AUTO: 4.43 10E6/UL (ref 4.4–5.9)
SODIUM SERPL-SCNC: 142 MMOL/L (ref 136–145)
WBC # BLD AUTO: 8.2 10E3/UL (ref 4–11)

## 2023-04-13 PROCEDURE — 99214 OFFICE O/P EST MOD 30 MIN: CPT | Performed by: FAMILY MEDICINE

## 2023-04-13 PROCEDURE — 80048 BASIC METABOLIC PNL TOTAL CA: CPT | Performed by: FAMILY MEDICINE

## 2023-04-13 PROCEDURE — 85027 COMPLETE CBC AUTOMATED: CPT | Performed by: FAMILY MEDICINE

## 2023-04-13 PROCEDURE — 36415 COLL VENOUS BLD VENIPUNCTURE: CPT | Performed by: FAMILY MEDICINE

## 2023-04-13 PROCEDURE — 86140 C-REACTIVE PROTEIN: CPT | Performed by: FAMILY MEDICINE

## 2023-04-13 PROCEDURE — 85652 RBC SED RATE AUTOMATED: CPT | Performed by: FAMILY MEDICINE

## 2023-04-13 RX ORDER — PREDNISONE 20 MG/1
TABLET ORAL
Qty: 20 TABLET | Refills: 0 | Status: SHIPPED | OUTPATIENT
Start: 2023-04-13 | End: 2023-10-04

## 2023-04-13 RX ORDER — LOSARTAN POTASSIUM 25 MG/1
25 TABLET ORAL DAILY
Qty: 90 TABLET | Refills: 3 | Status: SHIPPED | OUTPATIENT
Start: 2023-04-13 | End: 2024-08-22

## 2023-04-13 ASSESSMENT — PAIN SCALES - GENERAL: PAINLEVEL: MILD PAIN (3)

## 2023-04-13 NOTE — NURSING NOTE
"Chief Complaint   Patient presents with     Musculoskeletal Problem     BP (!) 150/90 (Cuff Size: Adult Regular)   Pulse (!) 48   Temp 97.7  F (36.5  C) (Tympanic)   Resp 18   Ht 1.727 m (5' 8\")   Wt 92.5 kg (204 lb)   SpO2 98%   BMI 31.02 kg/m   Estimated body mass index is 31.02 kg/m  as calculated from the following:    Height as of this encounter: 1.727 m (5' 8\").    Weight as of this encounter: 92.5 kg (204 lb).  Patient presents to the clinic using No DME      Health Maintenance that is potentially due pending provider review:    Health Maintenance Due   Topic Date Due     YEARLY PREVENTIVE VISIT  Never done     ADVANCE CARE PLANNING  Never done     HEPATITIS B IMMUNIZATION (1 of 3 - 3-dose series) Never done     HIV SCREENING  Never done     HEPATITIS C SCREENING  Never done     LIPID  Never done     ZOSTER IMMUNIZATION (1 of 2) Never done     COLORECTAL CANCER SCREENING  07/20/2020     COVID-19 Vaccine (3 - Booster for Darian series) 01/19/2022     DTAP/TDAP/TD IMMUNIZATION (3 - Td or Tdap) 10/25/2022                "

## 2023-04-13 NOTE — PROGRESS NOTES
Assessment & Plan     (M19.90) Inflammatory arthritis  (primary encounter diagnosis)  Comment: Possible rheumatoid arthritis. Significant swelling of left index finger with loss of  strength and decreased ROM. Denies trauma to area. Patient also endorses morning stiffness and swelling that improves throughout the day. Has gotten progressively worse since onset 6-8 months ago. Ibuprofen and icing unhelpful. Discussed referral to rheumatology for further workup and management.  Plan: CBC with platelets, Basic metabolic panel  (Ca,        Cl, CO2, Creat, Gluc, K, Na, BUN), ESR:         Erythrocyte sedimentation rate, CRP,         inflammation, Adult Rheumatology          Referral, predniSONE (DELTASONE) 20 MG tablet    (I10) Benign essential hypertension  Comment: BP elevated but patient ran out of medication and was unable to get refill recently. Rx was sent to pharmacy today.  Plan: losartan (COZAAR) 25 MG tablet    (Z12.11) Colon cancer screening  Plan: Colonoscopy Screening  Referral    Follow-up in 1 month or earlier if needed    BRET KeaneS on 4/13/2023 at 3:58 PM    I have reviewed today's vital signs, notes, medications, labs and imaging. I have also seen and examined the patient and agree with the findings and plan as outlined above.      Hema Weaver MD  [facility]    Matias Castellanos is a 57 year old, presenting for the following health issues:  Musculoskeletal Problem      History of Present Illness       Reason for visit:  Arthritis??  Symptom onset:  More than a month  Symptoms include:  Right hand- thumb and pointer finger. Starting in the other fingers     He eats 0-1 servings of fruits and vegetables daily.He consumes 0 sweetened beverage(s) daily.He exercises with enough effort to increase his heart rate 9 or less minutes per day.    He is taking medications regularly.         Review of Systems   Constitutional, HEENT, cardiovascular,  "pulmonary, gi and gu systems are negative, except as otherwise noted.      Objective    BP (!) 150/90 (Cuff Size: Adult Regular)   Pulse (!) 48   Temp 97.7  F (36.5  C) (Tympanic)   Resp 18   Ht 1.727 m (5' 8\")   Wt 92.5 kg (204 lb)   SpO2 98%   BMI 31.02 kg/m    Body mass index is 31.02 kg/m .  Physical Exam   GENERAL: healthy, alert and no distress  EYES: Eyes grossly normal to inspection, conjunctivae and sclerae normal  NECK: no adenopathy, no asymmetry, masses, or scars  RESP: lungs clear to auscultation - no rales, rhonchi or wheezes  CV: regular rate and rhythm, normal S1 S2, no S3 or S4, no murmur, click or rub, no peripheral edema and peripheral pulses strong  ABDOMEN: soft, nontender, no hepatosplenomegaly, no masses  MS: Negative left wrist Tinel's. Strong peripheral pulses.   Left index finger: no tenderness to palpation or pain with movement. No erythema or bruising. Normal sensation. Normal temperature. Decreased ROM and  strength secondary to significant swelling.  CNS: Grossly intact              "

## 2023-04-15 DIAGNOSIS — N28.9 IMPAIRED RENAL FUNCTION: Primary | ICD-10-CM

## 2023-04-16 DIAGNOSIS — M79.644 PAIN OF FINGER OF RIGHT HAND: Primary | ICD-10-CM

## 2023-04-17 ENCOUNTER — TELEPHONE (OUTPATIENT)
Dept: RHEUMATOLOGY | Facility: CLINIC | Age: 58
End: 2023-04-17
Payer: COMMERCIAL

## 2023-04-17 NOTE — TELEPHONE ENCOUNTER
Message left for patient to call this RN back directly to confirm if he could make an appt with Dr. Alonso tomorrow 4/18 at 1330.    DELIO ChristensenN, RN  RN Care Coordinator Rheumatology

## 2023-04-18 ENCOUNTER — TELEPHONE (OUTPATIENT)
Dept: RHEUMATOLOGY | Facility: CLINIC | Age: 58
End: 2023-04-18
Payer: COMMERCIAL

## 2023-04-18 NOTE — TELEPHONE ENCOUNTER
Additional message left for patient on personal cell phone regarding appt with  today. Home phone number is not accessible.    DELIO ChristensenN, RN  RN Care Coordinator Rheumatology

## 2023-04-20 ENCOUNTER — TELEPHONE (OUTPATIENT)
Dept: SURGERY | Facility: CLINIC | Age: 58
End: 2023-04-20
Payer: COMMERCIAL

## 2023-04-20 NOTE — TELEPHONE ENCOUNTER
Screening Questions  BLUE  KIND OF PREP RED  LOCATION [review exclusion criteria] GREEN  SEDATION TYPE        y Are you active on mychart?       denis Ordering/Referring Provider?        [facility] What type of coverage do you have?      n Have you had a positive covid test in the last 14 days?     31.02 1. BMI  [BMI 40+ - review exclusion criteria& smart-phrase document]    y  2. Are you able to give consent for your medical care? [IF NO,RN REVIEW]          n  3. Are you taking any prescription pain medications on a routine schedule   (ex narcotics: oxycodone, roxicodone, oxycontin,  and percocet)? [RN Review]        n  3a. EXTENDED PREP What kind of prescription?     n 4. Do you have any chemical dependencies such as alcohol, street drugs, or methadone?        **If yes 3- 5 , please schedule with MAC sedation.**          IF YES TO ANY 6 - 10 - HOSPITAL SETTING ONLY.     n 6.   Do you need assistance transferring?     n 7.   Have you had a heart or lung transplant?    n 8.   Are you currently on dialysis?   n 9.   Do you use daily home oxygen?   n 10. Do you take nitroglycerin?   10a. n If yes, how often?     11. [FEMALES]  n Are you currently pregnant?    11a. n If yes, how many weeks? [ Greater than 12 weeks, OR NEEDED]    n 12. Do you have Pulmonary Hypertension? *NEED PAC APPT AT [facility] w/ MAC*     n 13. [review exclusion criteria]  Do you have any implantable devices in your body (pacemaker, defib, LVAD)?    n 14. In the past 6 months, have you had any heart related issues including cardiomyopathy or heart attack?     14a. n If yes, did it require cardiac stenting if so when?     n 15. Have you had a stroke or Transient ischemic attack (TIA - aka  mini stroke ) within 6 months?      y 16. Do you have mod to severe Obstructive Sleep Apnea?  [Hospital only]    n 17. Do you have SEVERE AND UNCONTROLLED asthma? *NEED PAC APPT AT [facility] w/MAC*     18. Are you currently taking any blood thinners?     18a. No.  "Continue to 19.   18b. Yes/no Blood Thinner: No [CONTINUE TO #19]    n 19. Do you take the medication Phentermine?    19a. If yes, \"Hold for 7 days before procedure.  Please consult your prescribing provider if you have questions about holding this medication.\"     n  20. Do you have chronic kidney disease?      n  21. Do you have a diagnosis of diabetes?     n  22. On a regular basis do you go 3-5 days between bowel movements?     y 23. Preferred LOCAL Pharmacy for Pre Prescription    [ LIST ONLY ONE PHARMACY]     [id number] - [place], [place] - [address]        - CLOSING REMINDERS -    Informed patient they will need an adult    Cannot take any type of public or medical transportation alone    Conscious Sedation- Needs  for 6 hours after the procedure       MAC/General-Needs  for 24 hours after procedure    Pre-Procedure Covid test to be completed [[facility] PCR Testing Required]    Confirmed Nurse will call to complete assessment       - SCHEDULING DETAILS -  n Hospital Setting Required? If yes, what is the exclusion?: jess Walters  Surgeon    7/27/23  Date of Procedure  Lower Endoscopy [Colonoscopy]  Type of Procedure Scheduled  [facility]- If you answer yes to questions #8, #20, #21 [  pts ]Which Colonoscopy Prep was Sent?     general Sedation Type     n PAC / Pre-op Required                 "

## 2023-05-03 ENCOUNTER — TELEPHONE (OUTPATIENT)
Dept: UROLOGY | Facility: CLINIC | Age: 58
End: 2023-05-03
Payer: COMMERCIAL

## 2023-05-03 DIAGNOSIS — Z80.42 FAMILY HISTORY OF PROSTATE CANCER: Primary | ICD-10-CM

## 2023-05-03 NOTE — TELEPHONE ENCOUNTER
Lab ordered. Called to speak with patient and wife answered. She states that patient is uncomfortable seeing a female provider and wishes to reschedule Urology appointment with a male provider.    Transferred to Urology scheduling.    Digna Villareal RN on 5/3/2023 at 2:58 PM

## 2023-05-03 NOTE — TELEPHONE ENCOUNTER
[facility] Call Center    Phone Message    May a detailed message be left on voicemail: yes     Reason for Call: Order(s): PSA  Reason for requested: yearly visit, pt is scheduled with Christiane on 5/16/23. She says he has a lab appointment tomorrow in [place] and hoping to get done at that time. Please review and advise. Thank you.  Date needed: ASAP  Provider name: Janeth    Action Taken: Message routed to:  Other: Urology    Travel Screening: Not Applicable

## 2023-05-04 ENCOUNTER — LAB (OUTPATIENT)
Dept: LAB | Facility: CLINIC | Age: 58
End: 2023-05-04
Payer: COMMERCIAL

## 2023-05-04 DIAGNOSIS — N28.9 IMPAIRED RENAL FUNCTION: ICD-10-CM

## 2023-05-04 DIAGNOSIS — Z80.42 FAMILY HISTORY OF PROSTATE CANCER: ICD-10-CM

## 2023-05-04 LAB
ANION GAP SERPL CALCULATED.3IONS-SCNC: 6 MMOL/L (ref 7–15)
BUN SERPL-MCNC: 18.6 MG/DL (ref 6–20)
CALCIUM SERPL-MCNC: 9.4 MG/DL (ref 8.6–10)
CHLORIDE SERPL-SCNC: 106 MMOL/L (ref 98–107)
CREAT SERPL-MCNC: 1.21 MG/DL (ref 0.67–1.17)
DEPRECATED HCO3 PLAS-SCNC: 29 MMOL/L (ref 22–29)
GFR SERPL CREATININE-BSD FRML MDRD: 71 ML/MIN/1.73M2
GLUCOSE SERPL-MCNC: 99 MG/DL (ref 70–99)
POTASSIUM SERPL-SCNC: 4.3 MMOL/L (ref 3.4–5.3)
PSA SERPL DL<=0.01 NG/ML-MCNC: 0.4 NG/ML (ref 0–3.5)
SODIUM SERPL-SCNC: 141 MMOL/L (ref 136–145)

## 2023-05-04 PROCEDURE — 36415 COLL VENOUS BLD VENIPUNCTURE: CPT

## 2023-05-04 PROCEDURE — 80048 BASIC METABOLIC PNL TOTAL CA: CPT

## 2023-05-04 PROCEDURE — 84153 ASSAY OF PSA TOTAL: CPT

## 2023-05-10 ENCOUNTER — ANCILLARY PROCEDURE (OUTPATIENT)
Dept: GENERAL RADIOLOGY | Facility: CLINIC | Age: 58
End: 2023-05-10
Attending: PEDIATRICS
Payer: COMMERCIAL

## 2023-05-10 ENCOUNTER — OFFICE VISIT (OUTPATIENT)
Dept: ORTHOPEDICS | Facility: CLINIC | Age: 58
End: 2023-05-10
Payer: COMMERCIAL

## 2023-05-10 VITALS
BODY MASS INDEX: 31.02 KG/M2 | HEART RATE: 40 BPM | DIASTOLIC BLOOD PRESSURE: 82 MMHG | WEIGHT: 204 LBS | SYSTOLIC BLOOD PRESSURE: 147 MMHG

## 2023-05-10 DIAGNOSIS — M79.642 BILATERAL HAND PAIN: Primary | ICD-10-CM

## 2023-05-10 DIAGNOSIS — M79.641 BILATERAL HAND PAIN: Primary | ICD-10-CM

## 2023-05-10 DIAGNOSIS — M79.642 BILATERAL HAND PAIN: ICD-10-CM

## 2023-05-10 DIAGNOSIS — M79.644 PAIN OF FINGER OF RIGHT HAND: ICD-10-CM

## 2023-05-10 DIAGNOSIS — M79.641 BILATERAL HAND PAIN: ICD-10-CM

## 2023-05-10 PROCEDURE — 73130 X-RAY EXAM OF HAND: CPT | Mod: TC | Performed by: RADIOLOGY

## 2023-05-10 PROCEDURE — 99214 OFFICE O/P EST MOD 30 MIN: CPT | Performed by: PEDIATRICS

## 2023-05-10 NOTE — PROGRESS NOTES
ASSESSMENT & PLAN    Emanuel was seen today for pain, numbness, pain, pain and pain.    Diagnoses and all orders for this visit:    Bilateral hand pain  -     XR Hand Bilateral G/E 3 Views; Future  -     Adult Rheumatology  Referral; Future    Pain of finger of right hand  -     Orthopedic  Referral  -     Adult Rheumatology  Referral; Future      This issue is acute on chronic and Unchanged.    We discussed these other possible diagnosis:    Previously diagnosed with Seronegative Spondyloarthropathy - recommend close follow up with Rheumatology.    Does have some osteoarthritis as well, discussed supportive care.    Plan:  - Today's Plan of Care:  Referral to Rheumatology - Fax to Dr. David Oliveros at [facility] is who you saw previously; [phone]    Discussed activity considerations and other supportive care including Ice/Heat, OTC and other topical medications as needed.    Diclofenac/Voltaren Gel    Rehab[phone]    -We also discussed other future treatment options:  US guided joint injections    Follow Up: as needed    Concerning signs and symptoms were reviewed and all questions were answered at this time.    Thanks for the opportunity to participate in the care of this patient, I will keep you updated on their progress.    CC: Hema Shirley MD [facility]  Sports Medicine Physician  [facility]        -----  Chief Complaint   Patient presents with     Right Index Finger - Pain, Numbness     Right Thumb - Pain     Left Ring Finger - Pain     Left Little Finger - Pain       SUBJECTIVE  Emanuel Landeros is a/an 57 year old male who is seen in consultation at the request of  Hema Weaver M.D. for evaluation of right index finger of right hand, little and ring finger on left hand.     The patient is seen by themselves.  The patient is Right handed    Onset: 8-9 month(s) ago.  Reports insidious onset without acute precipitating event.  Location of Pain: bilateral hand pain; Right hand index finger and Thumb                                                              Left hand Little and Ring finger  Worsened by: gripping, pulling, grabbing, index finger flexion   Better with: nothing   Treatments tried: rest/activity avoidance, ice, Tylenol, ibuprofen and other medications: Prednisone (Medrol, 1 week dose, last date taken 2 weeks ago)  Associated symptoms: swelling, numbness, weakness of fingers and feeling of instability    Orthopedic/Surgical history: YES - Date: previously had trigger finger, thumbs. Has previously had corticosteroid injection and had relief until present  - Saw me 12/5/2018 for similar problems, was eventually referred to Rheumatology.  - Was diagnosed with inflammatory polyarthropathy, started on medication. Is no longer taking this.    Social History/Occupation: .       REVIEW OF SYSTEMS:  Review of Systems    OBJECTIVE:  BP (!) 147/82   Pulse (!) 40   Wt 92.5 kg (204 lb)   BMI 31.02 kg/m     General: healthy, alert and in no distress  Skin: no suspicious lesions or rash.  CV: distal perfusion intact   Resp: normal respiratory effort without conversational dyspnea   Psych: normal mood and affect  Gait: NORMAL  Neuro: Normal light sensory exam of upper extremity    Bilateral Wrist and Hand exam    Inspection:       Swelling: various joints    Tender:        right index finger of right hand PIP joint, little and ring finger on left hand.     Non Tender:       Remainder of the Wrist and Hand bilateral    ROM:       Slightly decreased full flexion of fingers, full and symmetric active and passive range of motion of the forearm, wrist and digits bilateral    Strength:       5/5 strength in the muscles of the hand, wrist and forearm bilateral    Neurovascular:       2+ radial pulses bilaterally with brisk capillary refill and      normal sensation  to light touch in the radial, median and ulnar nerve distributions    RADIOLOGY:  Final results and radiologist's interpretation, available in the [facility] health record.  Images were reviewed with the patient in the office today.  My personal interpretation of the performed imaging:  3 XR views of bilateral hands reviewed: no acute bony abnormality, various joint degenerative changes,  - will follow official read    Component      Latest Ref Rng 4/13/2023  4:02 PM   Sed Rate      0 - 20 mm/hr 8    CRP Inflammation      <5.00 mg/L <3.00          Review of prior external note(s) from - Outside records from Rheumatology  Review of the result(s) of each unique test - XR , Labs

## 2023-05-10 NOTE — LETTER
5/10/2023         RE: Emanuel Landeros  [address]  [facility] [address]        Dear Colleague,    Thank you for referring your patient, Emanuel Landeros, to the [facility] [place]. Please see a copy of my visit note below.    ASSESSMENT & PLAN    Emanuel was seen today for pain, numbness, pain, pain and pain.    Diagnoses and all orders for this visit:    Bilateral hand pain  -     XR Hand Bilateral G/E 3 Views; Future  -     Adult Rheumatology  Referral; Future    Pain of finger of right hand  -     Orthopedic  Referral  -     Adult Rheumatology  Referral; Future      This issue is acute on chronic and Unchanged.    We discussed these other possible diagnosis:    Previously diagnosed with Seronegative Spondyloarthropathy - recommend close follow up with Rheumatology.    Does have some osteoarthritis as well, discussed supportive care.    Plan:  - Today's Plan of Care:  Referral to Rheumatology - Fax to Dr. David Oliveros at [facility] is who you saw previously; [phone]    Discussed activity considerations and other supportive care including Ice/Heat, OTC and other topical medications as needed.    Diclofenac/Voltaren Gel    Rehab: Occupational Therapy: [facility] - [phone]    -We also discussed other future treatment options:  US guided joint injections    Follow Up: as needed    Concerning signs and symptoms were reviewed and all questions were answered at this time.    Thanks for the opportunity to participate in the care of this patient, I will keep you updated on their progress.    CC: Hema Shirley MD [facility]  Sports Medicine Physician  [facility]        -----  Chief Complaint   Patient presents with     Right Index Finger - Pain, Numbness     Right Thumb - Pain     Left Ring Finger - Pain     Left Little Finger - Pain       SUBJECTIVE  Emanuel Landeros is a/an  57 year old male who is seen in consultation at the request of  Hema Weaver M.D. for evaluation of right index finger of right hand, little and ring finger on left hand.     The patient is seen by themselves.  The patient is Right handed    Onset: 8-9 month(s) ago. Reports insidious onset without acute precipitating event.  Location of Pain: bilateral hand pain; Right hand index finger and Thumb                                                              Left hand Little and Ring finger  Worsened by: gripping, pulling, grabbing, index finger flexion   Better with: nothing   Treatments tried: rest/activity avoidance, ice, Tylenol, ibuprofen and other medications: Prednisone (Medrol, 1 week dose, last date taken 2 weeks ago)  Associated symptoms: swelling, numbness, weakness of fingers and feeling of instability    Orthopedic/Surgical history: YES - Date: previously had trigger finger, thumbs. Has previously had corticosteroid injection and had relief until present  - Saw me 12/5/2018 for similar problems, was eventually referred to Rheumatology.  - Was diagnosed with inflammatory polyarthropathy, started on medication. Is no longer taking this.    Social History/Occupation: .       REVIEW OF SYSTEMS:  Review of Systems    OBJECTIVE:  BP (!) 147/82   Pulse (!) 40   Wt 92.5 kg (204 lb)   BMI 31.02 kg/m     General: healthy, alert and in no distress  Skin: no suspicious lesions or rash.  CV: distal perfusion intact   Resp: normal respiratory effort without conversational dyspnea   Psych: normal mood and affect  Gait: NORMAL  Neuro: Normal light sensory exam of upper extremity    Bilateral Wrist and Hand exam    Inspection:       Swelling: various joints    Tender:        right index finger of right hand PIP joint, little and ring finger on left hand.     Non Tender:       Remainder of the Wrist and Hand bilateral    ROM:       Slightly decreased full flexion of fingers, full and symmetric  active and passive range of motion of the forearm, wrist and digits bilateral    Strength:       5/5 strength in the muscles of the hand, wrist and forearm bilateral    Neurovascular:       2+ radial pulses bilaterally with brisk capillary refill and      normal sensation to light touch in the radial, median and ulnar nerve distributions    RADIOLOGY:  Final results and radiologist's interpretation, available in the [facility] health record.  Images were reviewed with the patient in the office today.  My personal interpretation of the performed imaging:  3 XR views of bilateral hands reviewed: no acute bony abnormality, various joint degenerative changes,  - will follow official read    Component      Latest Ref Rng 4/13/2023  4:02 PM   Sed Rate      0 - 20 mm/hr 8    CRP Inflammation      <5.00 mg/L <3.00          Review of prior external note(s) from - Outside records from Rheumatology  Review of the result(s) of each unique test - XR , Labs             Again, thank you for allowing me to participate in the care of your patient.        Sincerely,        Cinda Shirley MD

## 2023-05-10 NOTE — PATIENT INSTRUCTIONS
We discussed these other possible diagnosis:    Previously diagnosed with Seronegative Spondyloarthropathy - recommend close follow up with Rheumatology.    Does have some osteoarthritis as well, discussed supportive care.    Plan:  - Today's Plan of Care:  Referral to Rheumatology - Fax to Dr. David Oliveros at [facility]; [phone]    Discussed activity considerations and other supportive care including Ice/Heat, OTC and other topical medications as needed.    Diclofenac/Voltaren Gel    [facility] - [phone]    -We also discussed other future treatment options:  US guided joint injections    Follow Up: as needed    If you have any further questions for your physician or physician s care team you can call [phone] and use option 3 to leave a voice message.

## 2023-05-11 ENCOUNTER — TELEPHONE (OUTPATIENT)
Dept: ORTHOPEDICS | Facility: CLINIC | Age: 58
End: 2023-05-11
Payer: COMMERCIAL

## 2023-07-14 RX ORDER — BISACODYL 5 MG/1
TABLET, DELAYED RELEASE ORAL
Qty: 4 TABLET | Refills: 0 | Status: SHIPPED | OUTPATIENT
Start: 2023-07-14 | End: 2023-10-04

## 2023-07-26 ENCOUNTER — ANESTHESIA EVENT (OUTPATIENT)
Dept: GASTROENTEROLOGY | Facility: CLINIC | Age: 58
End: 2023-07-26
Payer: COMMERCIAL

## 2023-07-26 NOTE — ANESTHESIA PREPROCEDURE EVALUATION
Anesthesia Pre-Procedure Evaluation    Patient: Emanuel Landeros   MRN: [id number] : 1965        Procedure : Procedure(s):  Colonoscopy          No past medical history on file.   Past Surgical History:   Procedure Laterality Date    C6-C7 fusion  2006     [facility]     EXCISE MASS ABDOMEN Left 2018    Procedure: EXCISE MASS ABDOMEN;  Excision soft tissue mass left abdomen;  Surgeon: Froylan Ayala MD;  Location: [place] OR    ORTHOPEDIC SURGERY      SOFT TISSUE SURGERY        No Known Allergies   Social History     Tobacco Use    Smoking status: Former     Packs/day: 0.00     Types: Cigarettes     Quit date: 2003     Years since quittin.1     Passive exposure: Past    Smokeless tobacco: Never   Substance Use Topics    Alcohol use: Yes     Comment: Daily - A few Beers      Wt Readings from Last 1 Encounters:   05/10/23 92.5 kg (204 lb)        Anesthesia Evaluation   Pt has had prior anesthetic. Type: General.    History of anesthetic complications  - .  TERESA.    ROS/MED HX  ENT/Pulmonary:     (+) sleep apnea,               tobacco use, Past use,                      Neurologic:       Cardiovascular:     (+)  - -   -  - -                                 Previous cardiac testing   Echo: Date: 2018 Results:  nterpretation Summary  Sinus bradycardia with normal resting blood pressure. With exercise there is a  normal heart rate and a hypertensive blood pressure response.  Average exercise capacity for age  Sinus bradycardia with no significant ECG abnormalities at rest, no ischemic  changes with exercise.  Normal resting LV function with good augmentation of all wall segments post  exercise.  This is a normal stress echo indicating low probability of significant  exercise-induced myocardial ischemia.     Functionally bicuspid aortic valve with fusion of the right and noncoronary  cusps.  Mild valvular aortic stenosis.  Mean gradient 14 mmHg, calculated aortic valve area 1.8 cmÂ  which  is  consistent with its appearance.  The ascending aorta is Mildly dilated.    Stress Test:  Date: Results:    ECG Reviewed:  Date: Results:    Cath:  Date: Results:      METS/Exercise Tolerance:     Hematologic:       Musculoskeletal: Comment: Cervical disc disorder with radiculopathy      GI/Hepatic:       Renal/Genitourinary:       Endo:       Psychiatric/Substance Use:     (+) psychiatric history anxiety       Infectious Disease:       Malignancy:       Other:            Physical Exam    Airway  airway exam normal      Mallampati: II   TM distance: > 3 FB   Neck ROM: full   Mouth opening: > 3 cm    Respiratory Devices and Support         Dental  no notable dental history     (+) Minor Abnormalities - some fillings, tiny chips      Cardiovascular   cardiovascular exam normal          Pulmonary   pulmonary exam normal                OUTSIDE LABS:  CBC:   Lab Results   Component Value Date    WBC 8.2 04/13/2023    WBC 7.2 05/30/2019    HGB 13.6 04/13/2023    HGB 13.1 (L) 05/30/2019    HCT 40.5 04/13/2023    HCT 39.4 (L) 05/30/2019     04/13/2023     05/30/2019     BMP:   Lab Results   Component Value Date     05/04/2023     04/13/2023    POTASSIUM 4.3 05/04/2023    POTASSIUM 4.3 04/13/2023    CHLORIDE 106 05/04/2023    CHLORIDE 107 04/13/2023    CO2 29 05/04/2023    CO2 22 04/13/2023    BUN 18.6 05/04/2023    BUN 21.6 (H) 04/13/2023    CR 1.21 (H) 05/04/2023    CR 1.47 (H) 04/13/2023    GLC 99 05/04/2023     (H) 04/13/2023     COAGS: No results found for: PTT, INR, FIBR  POC: No results found for: BGM, HCG, HCGS  HEPATIC:   Lab Results   Component Value Date    ALT 30 05/30/2019     OTHER:   Lab Results   Component Value Date    ALINE 9.4 05/04/2023    TSH 3.97 11/20/2018    T4 1.01 06/28/2010    CRP 4.6 12/06/2018    SED 8 04/13/2023       Anesthesia Plan    ASA Status:  2    NPO Status:  NPO Appropriate    Anesthesia Type: General.   Induction: Propofol, Intravenous.   Maintenance:  TIVA.        Consents    Anesthesia Plan(s) and associated risks, benefits, and realistic alternatives discussed. Questions answered and patient/representative(s) expressed understanding.     - Discussed: Risks, Benefits and Alternatives for BOTH SEDATION and the PROCEDURE were discussed     - Discussed with:  Patient            Postoperative Care    Pain management: Multi-modal analgesia, IV analgesics, Oral pain medications.   PONV prophylaxis: Ondansetron (or other 5HT-3), Dexamethasone or Solumedrol, Background Propofol Infusion     Comments:                Burt Chatterjee CRNA, APRN CRNA

## 2023-07-27 ENCOUNTER — HOSPITAL ENCOUNTER (OUTPATIENT)
Facility: CLINIC | Age: 58
Discharge: HOME | End: 2023-07-27
Attending: SURGERY | Admitting: SURGERY
Payer: COMMERCIAL

## 2023-07-27 ENCOUNTER — ANESTHESIA (OUTPATIENT)
Dept: GASTROENTEROLOGY | Facility: CLINIC | Age: 58
End: 2023-07-27
Payer: COMMERCIAL

## 2023-07-27 VITALS
HEART RATE: 224 BPM | BODY MASS INDEX: 28.79 KG/M2 | DIASTOLIC BLOOD PRESSURE: 87 MMHG | WEIGHT: 190 LBS | TEMPERATURE: 97.5 F | OXYGEN SATURATION: 96 % | SYSTOLIC BLOOD PRESSURE: 106 MMHG | HEIGHT: 68 IN

## 2023-07-27 DIAGNOSIS — Z12.11 SPECIAL SCREENING FOR MALIGNANT NEOPLASMS, COLON: Primary | ICD-10-CM

## 2023-07-27 LAB — COLONOSCOPY: NORMAL

## 2023-07-27 PROCEDURE — 45382 COLONOSCOPY W/CONTROL BLEED: CPT | Performed by: SURGERY

## 2023-07-27 PROCEDURE — 250N000009 HC RX 250: Performed by: NURSE ANESTHETIST, CERTIFIED REGISTERED

## 2023-07-27 PROCEDURE — 45381 COLONOSCOPY SUBMUCOUS NJX: CPT | Mod: PT | Performed by: SURGERY

## 2023-07-27 PROCEDURE — 45385 COLONOSCOPY W/LESION REMOVAL: CPT | Mod: PT | Performed by: SURGERY

## 2023-07-27 PROCEDURE — 88305 TISSUE EXAM BY PATHOLOGIST: CPT | Mod: TC | Performed by: SURGERY

## 2023-07-27 PROCEDURE — 258N000003 HC RX IP 258 OP 636: Performed by: SURGERY

## 2023-07-27 PROCEDURE — 88305 TISSUE EXAM BY PATHOLOGIST: CPT | Mod: 26

## 2023-07-27 PROCEDURE — 250N000011 HC RX IP 250 OP 636: Performed by: NURSE ANESTHETIST, CERTIFIED REGISTERED

## 2023-07-27 PROCEDURE — 250N000009 HC RX 250: Performed by: SURGERY

## 2023-07-27 PROCEDURE — 370N000017 HC ANESTHESIA TECHNICAL FEE, PER MIN: Performed by: SURGERY

## 2023-07-27 PROCEDURE — 45380 COLONOSCOPY AND BIOPSY: CPT | Performed by: SURGERY

## 2023-07-27 RX ORDER — NALOXONE HYDROCHLORIDE 0.4 MG/ML
0.4 INJECTION, SOLUTION INTRAMUSCULAR; INTRAVENOUS; SUBCUTANEOUS
Status: DISCONTINUED | OUTPATIENT
Start: 2023-07-27 | End: 2023-07-27 | Stop reason: HOSPADM

## 2023-07-27 RX ORDER — PROPOFOL 10 MG/ML
INJECTION, EMULSION INTRAVENOUS CONTINUOUS PRN
Status: DISCONTINUED | OUTPATIENT
Start: 2023-07-27 | End: 2023-07-27

## 2023-07-27 RX ORDER — SODIUM CHLORIDE, SODIUM LACTATE, POTASSIUM CHLORIDE, CALCIUM CHLORIDE 600; 310; 30; 20 MG/100ML; MG/100ML; MG/100ML; MG/100ML
INJECTION, SOLUTION INTRAVENOUS CONTINUOUS
Status: DISCONTINUED | OUTPATIENT
Start: 2023-07-27 | End: 2023-07-27 | Stop reason: HOSPADM

## 2023-07-27 RX ORDER — ONDANSETRON 4 MG/1
4 TABLET, ORALLY DISINTEGRATING ORAL EVERY 30 MIN PRN
Status: DISCONTINUED | OUTPATIENT
Start: 2023-07-27 | End: 2023-07-27 | Stop reason: HOSPADM

## 2023-07-27 RX ORDER — PROCHLORPERAZINE MALEATE 10 MG
10 TABLET ORAL EVERY 6 HOURS PRN
Status: DISCONTINUED | OUTPATIENT
Start: 2023-07-27 | End: 2023-07-27 | Stop reason: HOSPADM

## 2023-07-27 RX ORDER — LIDOCAINE HYDROCHLORIDE 20 MG/ML
INJECTION, SOLUTION INFILTRATION; PERINEURAL PRN
Status: DISCONTINUED | OUTPATIENT
Start: 2023-07-27 | End: 2023-07-27

## 2023-07-27 RX ORDER — NALOXONE HYDROCHLORIDE 0.4 MG/ML
0.2 INJECTION, SOLUTION INTRAMUSCULAR; INTRAVENOUS; SUBCUTANEOUS
Status: DISCONTINUED | OUTPATIENT
Start: 2023-07-27 | End: 2023-07-27 | Stop reason: HOSPADM

## 2023-07-27 RX ORDER — LIDOCAINE 40 MG/G
CREAM TOPICAL
Status: DISCONTINUED | OUTPATIENT
Start: 2023-07-27 | End: 2023-07-27 | Stop reason: HOSPADM

## 2023-07-27 RX ORDER — ONDANSETRON 2 MG/ML
INJECTION INTRAMUSCULAR; INTRAVENOUS PRN
Status: DISCONTINUED | OUTPATIENT
Start: 2023-07-27 | End: 2023-07-27

## 2023-07-27 RX ORDER — ONDANSETRON 2 MG/ML
4 INJECTION INTRAMUSCULAR; INTRAVENOUS EVERY 6 HOURS PRN
Status: DISCONTINUED | OUTPATIENT
Start: 2023-07-27 | End: 2023-07-27 | Stop reason: HOSPADM

## 2023-07-27 RX ORDER — ONDANSETRON 4 MG/1
4 TABLET, ORALLY DISINTEGRATING ORAL EVERY 6 HOURS PRN
Status: DISCONTINUED | OUTPATIENT
Start: 2023-07-27 | End: 2023-07-27 | Stop reason: HOSPADM

## 2023-07-27 RX ORDER — ONDANSETRON 2 MG/ML
4 INJECTION INTRAMUSCULAR; INTRAVENOUS EVERY 30 MIN PRN
Status: DISCONTINUED | OUTPATIENT
Start: 2023-07-27 | End: 2023-07-27 | Stop reason: HOSPADM

## 2023-07-27 RX ORDER — FLUMAZENIL 0.1 MG/ML
0.2 INJECTION, SOLUTION INTRAVENOUS
Status: DISCONTINUED | OUTPATIENT
Start: 2023-07-27 | End: 2023-07-27 | Stop reason: HOSPADM

## 2023-07-27 RX ORDER — PROPOFOL 10 MG/ML
INJECTION, EMULSION INTRAVENOUS PRN
Status: DISCONTINUED | OUTPATIENT
Start: 2023-07-27 | End: 2023-07-27

## 2023-07-27 RX ADMIN — ONDANSETRON 4 MG: 2 INJECTION INTRAMUSCULAR; INTRAVENOUS at 11:14

## 2023-07-27 RX ADMIN — SODIUM CHLORIDE, POTASSIUM CHLORIDE, SODIUM LACTATE AND CALCIUM CHLORIDE: 600; 310; 30; 20 INJECTION, SOLUTION INTRAVENOUS at 10:27

## 2023-07-27 RX ADMIN — PROPOFOL 100 MG: 10 INJECTION, EMULSION INTRAVENOUS at 10:55

## 2023-07-27 RX ADMIN — LIDOCAINE HYDROCHLORIDE 100 MG: 20 INJECTION, SOLUTION INFILTRATION; PERINEURAL at 10:55

## 2023-07-27 RX ADMIN — PROPOFOL 200 MCG/KG/MIN: 10 INJECTION, EMULSION INTRAVENOUS at 10:55

## 2023-07-27 RX ADMIN — LIDOCAINE HYDROCHLORIDE 0.1 ML: 10 INJECTION, SOLUTION EPIDURAL; INFILTRATION; INTRACAUDAL; PERINEURAL at 10:27

## 2023-07-27 ASSESSMENT — ACTIVITIES OF DAILY LIVING (ADL): ADLS_ACUITY_SCORE: 35

## 2023-07-27 ASSESSMENT — LIFESTYLE VARIABLES: TOBACCO_USE: 1

## 2023-07-27 NOTE — H&P
ENDOSCOPY PRE-SEDATION H&P FOR OUTPATIENT PROCEDURES    Emanuel Landeros  [id number]  1965    Procedure:   Colonoscopy possible biopsy, possible polypectomy, with MAC sedation.     Pre-procedure diagnosis: screen    Past medical history: History reviewed. No pertinent past medical history.    Past surgical history:   Past Surgical History:   Procedure Laterality Date    C6-C7 fusion  2006     [facility]     EXCISE MASS ABDOMEN Left 5/9/2018    Procedure: EXCISE MASS ABDOMEN;  Excision soft tissue mass left abdomen;  Surgeon: Froylan Ayala MD;  Location: [facility] OR    ORTHOPEDIC SURGERY      SOFT TISSUE SURGERY         Current Facility-Administered Medications   Medication    flumazenil (ROMAZICON) injection 0.2 mg    lactated ringers infusion    lidocaine (LMX4) kit    lidocaine 1 % 0.1-1 mL    naloxone (NARCAN) injection 0.2 mg    naloxone (NARCAN) injection 0.2 mg    naloxone (NARCAN) injection 0.4 mg    naloxone (NARCAN) injection 0.4 mg    ondansetron (ZOFRAN ODT) ODT tab 4 mg    Or    ondansetron (ZOFRAN) injection 4 mg    ondansetron (ZOFRAN ODT) ODT tab 4 mg    Or    ondansetron (ZOFRAN) injection 4 mg    prochlorperazine (COMPAZINE) injection 10 mg    Or    prochlorperazine (COMPAZINE) tablet 10 mg    prochlorperazine (COMPAZINE) injection 5 mg    sodium chloride (PF) 0.9% PF flush 3 mL    sodium chloride (PF) 0.9% PF flush 3 mL       No Known Allergies    History of Anesthesia/Sedation Problems: no    Physical Exam:    Mental status: alert  Heart: Normal  Lung: Normal  Assessment of patient's airway: Normal  Other as pertinent for procedure: None     ASA Score: See Provation note    Mallampati score:  II - Faucial pillars and soft palate may be seen, but uvula is masked by the base of the tongue    Assessment/Plan:     The patient is an appropriate candidate to receive sedation.    Informed consent was discussed with the patient/family, including the risks, benefits, potential complications and any  alternative options associated with sedation.    Patient assessment completed just prior to sedation and while under constant observation by the provider. Condition determined to be adequate for proceeding with sedation.    The specific risks for the procedure were discussed with the patient at the time of informed consent and include but are not limited to perforation which could require surgery, missing significant neoplasm or lesion, hemorrhage and adverse sedative complication.      Dominick Walters MD

## 2023-07-27 NOTE — ANESTHESIA CARE TRANSFER NOTE
Patient: Emanuel Landeros    Procedure: Procedure(s):  Colonoscopy, with polypectomy and biopsy  TATTOOING, WITH COLONOSCOPY  COLONOSCOPY, WITH HEMORRHAGE CONTROL       Diagnosis: Colon cancer screening [Z12.11]  Diagnosis Additional Information: No value filed.    Anesthesia Type:   General     Note:    Oropharynx: oropharynx clear of all foreign objects and spontaneously breathing  Level of Consciousness: awake  Oxygen Supplementation: room air    Independent Airway: airway patency satisfactory and stable  Dentition: dentition unchanged  Vital Signs Stable: post-procedure vital signs reviewed and stable  Report to RN Given: handoff report given  Patient transferred to: Phase II    Handoff Report: Identifed the Patient, Identified the Reponsible Provider, Reviewed the pertinent medical history, Discussed the surgical course, Reviewed Intra-OP anesthesia mangement and issues during anesthesia, Set expectations for post-procedure period and Allowed opportunity for questions and acknowledgement of understanding      Vitals:  Vitals Value Taken Time   BP     Temp     Pulse     Resp     SpO2         Electronically Signed By: MARY Wiseman CRNA  July 27, 2023  11:34 AM

## 2023-07-27 NOTE — ANESTHESIA POSTPROCEDURE EVALUATION
Patient: Emanuel Landeros    Procedure: Procedure(s):  Colonoscopy, with polypectomy and biopsy  TATTOOING, WITH COLONOSCOPY  COLONOSCOPY, WITH HEMORRHAGE CONTROL       Anesthesia Type:  General    Note:  Disposition: Outpatient   Postop Pain Control: Uneventful            Sign Out: Well controlled pain   PONV: No   Neuro/Psych: Uneventful            Sign Out: Acceptable/Baseline neuro status   Airway/Respiratory: Uneventful            Sign Out: Acceptable/Baseline resp. status   CV/Hemodynamics: Uneventful            Sign Out: Acceptable CV status; No obvious hypovolemia; No obvious fluid overload   Other NRE: NONE   DID A NON-ROUTINE EVENT OCCUR? No           Last vitals:  Vitals:    07/27/23 0958   BP: (!) 152/91   Pulse: (!) 43   Temp: 37  C (98.6  F)   SpO2: 98%       Electronically Signed By: MARY Wiseman CRNA  July 27, 2023  11:34 AM

## 2023-07-27 NOTE — LETTER
August 3, 2023      Emanuel Landeros  [address] [facility] [address]        Dear ,    We are writing to inform you of your test results.    Your pathology report was:  No cancer.  But very large polyp.  Showed an Adenomatous polyp. This is a benign (not cancerous) growth; however these can become cancer over time. This polyp is usually removed completely at the time of the biopsy. Because it is an Adenomatous polyp you do have a slight higher risk for colon cancer. This is why you will need a repeat colonoscopy in approximately 1 year .  If you do have further questions please don t hesitate to call the below number.      To make an appointment Please call [phone], for  [facility] or  for [facility], to schedule a follow up appointment in 2 weeks.       Resulted Orders   Surgical Pathology Exam   Result Value Ref Range    Case Report       Surgical Pathology Report                         Case: [id number]                                  Authorizing Provider:  Dominick Walters MD Collected:           07/27/2023 11:11 AM          Ordering Location:     [facility]   Received:            07/27/2023 01:28 PM                                 [place]                                                                      Pathologist:           Abi Klein MD                                                           Specimens:   A) - Large Intestine, Colon, Ascending, Proximal ascending                                          B) - Large Intestine, Colon, 80cm                                                                   C) - Large Intestine, Colon, 40cm                                                          Final Diagnosis       A.  Ascending colon-proximal, polyp, polypectomy:  - Sessile serrated adenoma.  - Polyp size: 6 mm (per endoscopy report).  - Negative for cytologic dysplasia and malignancy.  - Complete resection and  "retrieval (per endoscopy report).    B.  Colon, polyp at 80 cm, polypectomy:  - Tubular adenoma.  - Polyp size: 5 mm (per endoscopy report).  - Negative for high-grade dysplasia and malignancy.  - Complete resection and retrieval (per endoscopy report).    C.  Colon, polyp at 40 cm, polypectomy:   - Tubular adenoma with displaced glands and evidence of remote hemorrhage (consistent with mechanical trauma).  - Polyp size: 20 mm (per endoscopy report).  - Negative for high-grade dysplasia and malignancy.  - Complete resection and retrieval (per endoscopy report).       Comment       Colonoscopy report is reviewed; a proximal ascending colon sessile polyp (6 mm), a 5 mm sessile polyp at 80 cm proximal to anus, and a 20 mm pedunculated polyp 40 cm proximal to anus, were all completely resected and retrieved.  Sigmoid colonic diverticulosis is additionally reported.      Clinical Information       Screening colonoscopy.      Gross Description       A(1). Large Intestine, Colon, Ascending, Proximal ascending:  The specimen is received in formalin, labeled with the patient's name, medical record number and other identifying information designated \"proximal ascending colon\". It consists of a single, 1.0 cm pale-tan polyp (inked black) which is sectioned into 4 pieces, is submitted entirely in 1 cassette.    B(2). Large Intestine, Colon, 80cm:  The specimen is received in formalin, labeled with the patient's name, medical record number and other identifying information designated \"colon at 80 cm\". It consists of a single, 1.0 cm pale-tan polyp (inked black) which is sectioned into 4 pieces, is submitted entirely in 1 cassette.    C(3). Large Intestine, Colon, 40cm:  The specimen is received in formalin, labeled with the patient's name, medical record number and other identifying information designated \"colon  at 40 cm\". It consists of a single, 2.0 x 1.0 x 0.8 cm tan-pink, pedunculated polyp (inked black) which is sectioned " into 5 pieces, is submitted entirely in 2 cassettes.   (Denise Pa)      Microscopic Description       A, B, and C.  Microscopic examination is performed.  Deeper levels are obtained on both blocks from specimen C for further evaluation.    Selected slides from specimen C is reviewed internally by an additional pathologist (Dr. Igor Narayanan) who concurs with the diagnosis.       Performing Labs       The technical component of this testing was completed at [facility]      Case Images         If you have any questions or concerns, please call the clinic at the number listed above.       Sincerely,      Dominick Walters MD

## 2023-08-01 LAB
PATH REPORT.COMMENTS IMP SPEC: NORMAL
PATH REPORT.FINAL DX SPEC: NORMAL
PATH REPORT.GROSS SPEC: NORMAL
PATH REPORT.MICROSCOPIC SPEC OTHER STN: NORMAL
PATH REPORT.RELEVANT HX SPEC: NORMAL
PHOTO IMAGE: NORMAL

## 2023-10-04 ENCOUNTER — OFFICE VISIT (OUTPATIENT)
Dept: FAMILY MEDICINE | Facility: CLINIC | Age: 58
End: 2023-10-04
Payer: COMMERCIAL

## 2023-10-04 VITALS
WEIGHT: 195 LBS | HEART RATE: 41 BPM | OXYGEN SATURATION: 98 % | TEMPERATURE: 97.5 F | HEIGHT: 68 IN | SYSTOLIC BLOOD PRESSURE: 128 MMHG | BODY MASS INDEX: 29.55 KG/M2 | RESPIRATION RATE: 16 BRPM | DIASTOLIC BLOOD PRESSURE: 82 MMHG

## 2023-10-04 DIAGNOSIS — R14.0 BLOATING: ICD-10-CM

## 2023-10-04 DIAGNOSIS — R10.13 DYSPEPSIA: Primary | ICD-10-CM

## 2023-10-04 PROCEDURE — 99213 OFFICE O/P EST LOW 20 MIN: CPT | Performed by: FAMILY MEDICINE

## 2023-10-04 ASSESSMENT — PAIN SCALES - GENERAL: PAINLEVEL: NO PAIN (0)

## 2023-10-04 NOTE — H&P (VIEW-ONLY)
Assessment & Plan     Dyspepsia  Patient complains of feeling some nausea, bloating and upper abdominal discomfort which he has been experiencing since having colonoscopy in July this year.  Colonoscopy findings reviewed.  Physical examination unremarkable.  Differentials discussed in detail including but not limited to gastroesophageal reflux disease, peptic ulcer, cholelithiasis and mass lesion.  CT abdomen/pelvis and upper GI endoscopy ordered.  Recommended to try Prilosec, available over-the-counter.  Gastroenterology referral placed for further review recommendations.  Patient understood and in agreement with above plan.  All questions answered.  - CT Abdomen Pelvis w Contrast; Future  - Adult GI  Referral - Procedure Only; Future  - Adult GI  Referral - Consult Only; Future    Bloating  - CT Abdomen Pelvis w Contrast; Future  - Adult GI  Referral - Procedure Only; Future  - Adult GI  Referral - Consult Only; Future      Hema Weaver MD  [facility]    Matias Castellanos is a 58 year old, presenting for the following health issues:  Abdominal Pain        10/4/2023     2:59 PM   Additional Questions   Roomed by Maggie       History of Present Illness       Reason for visit:  Stomach    He eats 0-1 servings of fruits and vegetables daily.He consumes 0 sweetened beverage(s) daily.   He is taking medications regularly.       Abdominal Pain    Duration: July 2023 after colonoscopy   Description (location/character/radiation): generalized abd pain        Associated flank pain: None  Intensity:  moderate  Accompanying signs and symptoms:        Fever/Chills: no        Gas/Bloating: YES- gas- has not changed but is more painful        Nausea/vomitting: YES - nausea  (Sx going on now) - Eating will help with this       Diarrhea: no        Dysuria or Hematuria: no   History (previous similar pain/trauma/previous testing): Colonoscopy  "7/27/2023  Precipitating or alleviating factors:       Pain worse with eating/BM/urination: no        Pain relieved by BM: no   Therapies tried and outcome: flac seed - helps with constipation   Pain better with food intake  LMP:  not applicable       Review of Systems   Constitutional, HEENT, cardiovascular, pulmonary, gi and gu systems are negative, except as otherwise noted.        Objective    /82 (Cuff Size: Adult Large)   Pulse (!) 41   Temp 97.5  F (36.4  C) (Tympanic)   Resp 16   Ht 1.727 m (5' 8\")   Wt 88.5 kg (195 lb)   SpO2 98%   BMI 29.65 kg/m    Body mass index is 29.65 kg/m .  Physical Exam   GENERAL: alert and no distress  EYES: Eyes grossly normal to inspection, PERRL and conjunctivae and sclerae normal  NECK: no adenopathy, no asymmetry, masses, or scars and thyroid normal to palpation  RESP: lungs clear to auscultation - no rales, rhonchi or wheezes  CV: bradycardia, normal S1 S2, no S3 or S4, and no murmur, click or rub  ABDOMEN: soft, nontender, no hepatosplenomegaly, no masses and bowel sounds normal  MS: no gross musculoskeletal defects noted, no edema  SKIN: no suspicious lesions or rashes  NEURO: Normal strength and tone, mentation intact and speech normal  PSYCH: mentation appears normal, affect normal/bright                "

## 2023-10-04 NOTE — PROGRESS NOTES
Assessment & Plan     Dyspepsia  Patient complains of feeling some nausea, bloating and upper abdominal discomfort which he has been experiencing since having colonoscopy in July this year.  Colonoscopy findings reviewed.  Physical examination unremarkable.  Differentials discussed in detail including but not limited to gastroesophageal reflux disease, peptic ulcer, cholelithiasis and mass lesion.  CT abdomen/pelvis and upper GI endoscopy ordered.  Recommended to try Prilosec, available over-the-counter.  Gastroenterology referral placed for further review recommendations.  Patient understood and in agreement with above plan.  All questions answered.  - CT Abdomen Pelvis w Contrast; Future  - Adult GI  Referral - Procedure Only; Future  - Adult GI  Referral - Consult Only; Future    Bloating  - CT Abdomen Pelvis w Contrast; Future  - Adult GI  Referral - Procedure Only; Future  - Adult GI  Referral - Consult Only; Future      Hema Weaver MD  [facility]    Matias Castellanos is a 58 year old, presenting for the following health issues:  Abdominal Pain        10/4/2023     2:59 PM   Additional Questions   Roomed by Maggie       History of Present Illness       Reason for visit:  Stomach    He eats 0-1 servings of fruits and vegetables daily.He consumes 0 sweetened beverage(s) daily.   He is taking medications regularly.       Abdominal Pain    Duration: July 2023 after colonoscopy   Description (location/character/radiation): generalized abd pain        Associated flank pain: None  Intensity:  moderate  Accompanying signs and symptoms:        Fever/Chills: no        Gas/Bloating: YES- gas- has not changed but is more painful        Nausea/vomitting: YES - nausea  (Sx going on now) - Eating will help with this       Diarrhea: no        Dysuria or Hematuria: no   History (previous similar pain/trauma/previous testing): Colonoscopy  "7/27/2023  Precipitating or alleviating factors:       Pain worse with eating/BM/urination: no        Pain relieved by BM: no   Therapies tried and outcome: flac seed - helps with constipation   Pain better with food intake  LMP:  not applicable       Review of Systems   Constitutional, HEENT, cardiovascular, pulmonary, gi and gu systems are negative, except as otherwise noted.        Objective    /82 (Cuff Size: Adult Large)   Pulse (!) 41   Temp 97.5  F (36.4  C) (Tympanic)   Resp 16   Ht 1.727 m (5' 8\")   Wt 88.5 kg (195 lb)   SpO2 98%   BMI 29.65 kg/m    Body mass index is 29.65 kg/m .  Physical Exam   GENERAL: alert and no distress  EYES: Eyes grossly normal to inspection, PERRL and conjunctivae and sclerae normal  NECK: no adenopathy, no asymmetry, masses, or scars and thyroid normal to palpation  RESP: lungs clear to auscultation - no rales, rhonchi or wheezes  CV: bradycardia, normal S1 S2, no S3 or S4, and no murmur, click or rub  ABDOMEN: soft, nontender, no hepatosplenomegaly, no masses and bowel sounds normal  MS: no gross musculoskeletal defects noted, no edema  SKIN: no suspicious lesions or rashes  NEURO: Normal strength and tone, mentation intact and speech normal  PSYCH: mentation appears normal, affect normal/bright                "

## 2023-10-09 ENCOUNTER — MYC MEDICAL ADVICE (OUTPATIENT)
Dept: GASTROENTEROLOGY | Facility: CLINIC | Age: 58
End: 2023-10-09
Payer: COMMERCIAL

## 2023-10-13 ENCOUNTER — ANESTHESIA EVENT (OUTPATIENT)
Dept: GASTROENTEROLOGY | Facility: CLINIC | Age: 58
End: 2023-10-13
Payer: COMMERCIAL

## 2023-10-13 ENCOUNTER — HOSPITAL ENCOUNTER (OUTPATIENT)
Dept: CT IMAGING | Facility: CLINIC | Age: 58
Discharge: HOME | End: 2023-10-13
Attending: FAMILY MEDICINE | Admitting: FAMILY MEDICINE
Payer: COMMERCIAL

## 2023-10-13 DIAGNOSIS — R14.0 BLOATING: ICD-10-CM

## 2023-10-13 DIAGNOSIS — R10.13 DYSPEPSIA: ICD-10-CM

## 2023-10-13 PROCEDURE — 74177 CT ABD & PELVIS W/CONTRAST: CPT

## 2023-10-13 PROCEDURE — 250N000011 HC RX IP 250 OP 636: Performed by: RADIOLOGY

## 2023-10-13 PROCEDURE — 250N000009 HC RX 250: Performed by: RADIOLOGY

## 2023-10-13 RX ORDER — IOPAMIDOL 755 MG/ML
95 INJECTION, SOLUTION INTRAVASCULAR ONCE
Status: COMPLETED | OUTPATIENT
Start: 2023-10-13 | End: 2023-10-13

## 2023-10-13 RX ADMIN — IOPAMIDOL 95 ML: 755 INJECTION, SOLUTION INTRAVENOUS at 14:05

## 2023-10-13 RX ADMIN — SODIUM CHLORIDE 65 ML: 9 INJECTION, SOLUTION INTRAVENOUS at 14:05

## 2023-10-13 NOTE — ANESTHESIA PREPROCEDURE EVALUATION
Anesthesia Pre-Procedure Evaluation    Patient: Emanuel Landeros   MRN: [id number] : 1965        Procedure : Procedure(s):  Esophagoscopy, gastroscopy, duodenoscopy (EGD), combined          No past medical history on file.   Past Surgical History:   Procedure Laterality Date    C6-C7 fusion  2006     [facility]     COLONOSCOPY N/A 2023    Procedure: Colonoscopy, with polypectomy and biopsy;  Surgeon: Dominick Walters MD;  Location: [facility]    EXCISE MASS ABDOMEN Left 2018    Procedure: EXCISE MASS ABDOMEN;  Excision soft tissue mass left abdomen;  Surgeon: Froylan Ayala MD;  Location: [facility]    ORTHOPEDIC SURGERY      SOFT TISSUE SURGERY        No Known Allergies   Social History     Tobacco Use    Smoking status: Former     Packs/day: 0     Types: Cigarettes     Quit date: 2003     Years since quittin.3     Passive exposure: Past    Smokeless tobacco: Never   Substance Use Topics    Alcohol use: Yes     Comment: Daily - A few Beers      Wt Readings from Last 1 Encounters:   10/04/23 88.5 kg (195 lb)        Anesthesia Evaluation   Pt has had prior anesthetic.     History of anesthetic complications  - .      ROS/MED HX  ENT/Pulmonary:     (+) sleep apnea,                                      Neurologic:       Cardiovascular:  - neg cardiovascular ROS     METS/Exercise Tolerance:     Hematologic:  - neg hematologic  ROS     Musculoskeletal:  - neg musculoskeletal ROS     GI/Hepatic:  - neg GI/hepatic ROS     Renal/Genitourinary:  - neg Renal ROS     Endo:  - neg endo ROS     Psychiatric/Substance Use:     (+) psychiatric history anxiety       Infectious Disease:  - neg infectious disease ROS     Malignancy:  - neg malignancy ROS     Other:            Physical Exam    Airway        Mallampati: I   TM distance: > 3 FB   Neck ROM: full   Mouth opening: > 3 cm    Respiratory Devices and Support         Dental       (+) Modest Abnormalities - crowns, retainers, 1 or 2 missing  "teeth      Cardiovascular   cardiovascular exam normal          Pulmonary   pulmonary exam normal                OUTSIDE LABS:  CBC:   Lab Results   Component Value Date    WBC 8.2 04/13/2023    WBC 7.2 05/30/2019    HGB 13.6 04/13/2023    HGB 13.1 (L) 05/30/2019    HCT 40.5 04/13/2023    HCT 39.4 (L) 05/30/2019     04/13/2023     05/30/2019     BMP:   Lab Results   Component Value Date     05/04/2023     04/13/2023    POTASSIUM 4.3 05/04/2023    POTASSIUM 4.3 04/13/2023    CHLORIDE 106 05/04/2023    CHLORIDE 107 04/13/2023    CO2 29 05/04/2023    CO2 22 04/13/2023    BUN 18.6 05/04/2023    BUN 21.6 (H) 04/13/2023    CR 1.21 (H) 05/04/2023    CR 1.47 (H) 04/13/2023    GLC 99 05/04/2023     (H) 04/13/2023     COAGS: No results found for: \"PTT\", \"INR\", \"FIBR\"  POC: No results found for: \"BGM\", \"HCG\", \"HCGS\"  HEPATIC:   Lab Results   Component Value Date    ALT 30 05/30/2019     OTHER:   Lab Results   Component Value Date    ALINE 9.4 05/04/2023    TSH 3.97 11/20/2018    T4 1.01 06/28/2010    CRP 4.6 12/06/2018    SED 8 04/13/2023       Anesthesia Plan    ASA Status:  3       Anesthesia Type: General.              Consents    Anesthesia Plan(s) and associated risks, benefits, and realistic alternatives discussed. Questions answered and patient/representative(s) expressed understanding.     - Discussed: Risks, Benefits and Alternatives for BOTH SEDATION and the PROCEDURE were discussed     - Discussed with:  Patient            Postoperative Care       PONV prophylaxis: Background Propofol Infusion     Comments:                MARY Nash CRNA  "

## 2023-10-16 ENCOUNTER — HOSPITAL ENCOUNTER (OUTPATIENT)
Facility: CLINIC | Age: 58
Discharge: HOME | End: 2023-10-16
Attending: SURGERY | Admitting: SURGERY
Payer: COMMERCIAL

## 2023-10-16 ENCOUNTER — ANESTHESIA (OUTPATIENT)
Dept: GASTROENTEROLOGY | Facility: CLINIC | Age: 58
End: 2023-10-16
Payer: COMMERCIAL

## 2023-10-16 VITALS
HEIGHT: 68 IN | SYSTOLIC BLOOD PRESSURE: 132 MMHG | DIASTOLIC BLOOD PRESSURE: 87 MMHG | HEART RATE: 48 BPM | TEMPERATURE: 96.3 F | WEIGHT: 195 LBS | OXYGEN SATURATION: 97 % | BODY MASS INDEX: 29.55 KG/M2

## 2023-10-16 LAB — UPPER GI ENDOSCOPY: NORMAL

## 2023-10-16 PROCEDURE — 43235 EGD DIAGNOSTIC BRUSH WASH: CPT | Performed by: SURGERY

## 2023-10-16 PROCEDURE — 250N000009 HC RX 250: Performed by: NURSE ANESTHETIST, CERTIFIED REGISTERED

## 2023-10-16 PROCEDURE — 250N000009 HC RX 250: Performed by: SURGERY

## 2023-10-16 PROCEDURE — 370N000017 HC ANESTHESIA TECHNICAL FEE, PER MIN: Performed by: SURGERY

## 2023-10-16 PROCEDURE — 258N000003 HC RX IP 258 OP 636: Performed by: SURGERY

## 2023-10-16 PROCEDURE — 250N000011 HC RX IP 250 OP 636: Performed by: NURSE ANESTHETIST, CERTIFIED REGISTERED

## 2023-10-16 RX ORDER — PROPOFOL 10 MG/ML
INJECTION, EMULSION INTRAVENOUS PRN
Status: DISCONTINUED | OUTPATIENT
Start: 2023-10-16 | End: 2023-10-16

## 2023-10-16 RX ORDER — ONDANSETRON 4 MG/1
4 TABLET, ORALLY DISINTEGRATING ORAL EVERY 30 MIN PRN
Status: DISCONTINUED | OUTPATIENT
Start: 2023-10-16 | End: 2023-10-16 | Stop reason: HOSPADM

## 2023-10-16 RX ORDER — LIDOCAINE 40 MG/G
CREAM TOPICAL
Status: DISCONTINUED | OUTPATIENT
Start: 2023-10-16 | End: 2023-10-16 | Stop reason: HOSPADM

## 2023-10-16 RX ORDER — ALBUTEROL SULFATE 0.83 MG/ML
2.5 SOLUTION RESPIRATORY (INHALATION) EVERY 4 HOURS PRN
Status: DISCONTINUED | OUTPATIENT
Start: 2023-10-16 | End: 2023-10-16 | Stop reason: HOSPADM

## 2023-10-16 RX ORDER — DEXAMETHASONE SODIUM PHOSPHATE 4 MG/ML
4 INJECTION, SOLUTION INTRA-ARTICULAR; INTRALESIONAL; INTRAMUSCULAR; INTRAVENOUS; SOFT TISSUE
Status: DISCONTINUED | OUTPATIENT
Start: 2023-10-16 | End: 2023-10-16 | Stop reason: HOSPADM

## 2023-10-16 RX ORDER — SODIUM CHLORIDE, SODIUM LACTATE, POTASSIUM CHLORIDE, CALCIUM CHLORIDE 600; 310; 30; 20 MG/100ML; MG/100ML; MG/100ML; MG/100ML
INJECTION, SOLUTION INTRAVENOUS CONTINUOUS
Status: DISCONTINUED | OUTPATIENT
Start: 2023-10-16 | End: 2023-10-16 | Stop reason: HOSPADM

## 2023-10-16 RX ORDER — ONDANSETRON 2 MG/ML
4 INJECTION INTRAMUSCULAR; INTRAVENOUS EVERY 30 MIN PRN
Status: DISCONTINUED | OUTPATIENT
Start: 2023-10-16 | End: 2023-10-16 | Stop reason: HOSPADM

## 2023-10-16 RX ADMIN — LIDOCAINE HYDROCHLORIDE 0.2 ML: 10 INJECTION, SOLUTION INFILTRATION; PERINEURAL at 11:25

## 2023-10-16 RX ADMIN — PROPOFOL 250 MG: 10 INJECTION, EMULSION INTRAVENOUS at 11:45

## 2023-10-16 RX ADMIN — SODIUM CHLORIDE, POTASSIUM CHLORIDE, SODIUM LACTATE AND CALCIUM CHLORIDE: 600; 310; 30; 20 INJECTION, SOLUTION INTRAVENOUS at 11:25

## 2023-10-16 RX ADMIN — TOPICAL ANESTHETIC 2 SPRAY: 200 SPRAY DENTAL; PERIODONTAL at 11:40

## 2023-10-16 ASSESSMENT — ACTIVITIES OF DAILY LIVING (ADL): ADLS_ACUITY_SCORE: 35

## 2023-10-16 NOTE — ANESTHESIA CARE TRANSFER NOTE
Patient: Emanuel Landeros    Procedure: Procedure(s):  Esophagoscopy, gastroscopy, duodenoscopy (EGD), combined       Diagnosis: Dyspepsia [R10.13]  Bloating [R14.0]  Diagnosis Additional Information: No value filed.    Anesthesia Type:   General     Note:    Oropharynx: oropharynx clear of all foreign objects and spontaneously breathing  Level of Consciousness: awake  Oxygen Supplementation: room air    Independent Airway: airway patency satisfactory and stable  Dentition: dentition unchanged  Vital Signs Stable: post-procedure vital signs reviewed and stable  Report to RN Given: handoff report given  Patient transferred to: Phase II    Handoff Report: Identifed the Patient, Identified the Reponsible Provider, Reviewed the pertinent medical history, Discussed the surgical course, Reviewed Intra-OP anesthesia mangement and issues during anesthesia, Set expectations for post-procedure period and Allowed opportunity for questions and acknowledgement of understanding      Vitals:  Vitals Value Taken Time   BP     Temp     Pulse     Resp     SpO2         Electronically Signed By: MARY Nash CRNA  October 16, 2023  11:53 AM

## 2023-10-16 NOTE — INTERVAL H&P NOTE
"I have reviewed the surgical (or preoperative) H&P that is linked to this encounter, and examined the patient. There are no significant changes    EGD for dyspepsia and bloating; no PPI; no blood thinner    Clinical Conditions Present on Arrival:  Clinically Significant Risk Factors Present on Admission                  # Overweight: Estimated body mass index is 29.65 kg/m  as calculated from the following:    Height as of this encounter: 1.727 m (5' 8\").    Weight as of this encounter: 88.5 kg (195 lb).       "

## 2023-10-17 NOTE — TELEPHONE ENCOUNTER
Called to remind patient of their upcoming appointment with our GI clinic, on Friday October 27th at 11:00am with Zuleima Wilkins. This appointment is scheduled as a video visit. You will receive a call approximately 30 minutes prior to check you in, you must be in [place] for this visit., if your appointment is virtual (video or telephone) you need to be in [place] for the visit. To reschedule or cancel patient to call [phone].    Mimi Murray

## 2023-10-25 NOTE — TELEPHONE ENCOUNTER
REFERRAL INFORMATION:  Referring Provider:  Hema Weaver MD  Referring Clinic:  [facility]  Reason for Visit/Diagnosis: Dyspepsia [R10.13]; Bloating [R14.0]     FUTURE VISIT INFORMATION:  Appointment Date: 10/27/23  Appointment Time: 11:00 AM     NOTES STATUS DETAILS   OFFICE NOTE from Referring Provider Internal 10/4/23 - Hema Weaver MD - Internal Med - Dyspepsia; bloating    MEDICATION LIST Internal         ENDOSCOPY  Internal 10/16/23   COLONOSCOPY Internal 7/27/23   PERTINENT LABS Internal 5/4/23- BMP    4/13/23 - BMP; CBCPD; CRP inflammation    PATHOLOGY REPORTS (RELATED) Internal 7/27/23 - colon bx    IMAGING (CT, MRI, EGD, MRCP, Small Bowel Follow Through/SBT, MR/CT Enterography) Internal CT Abdomen Pelvis - 10/13/23

## 2023-10-27 ENCOUNTER — VIRTUAL VISIT (OUTPATIENT)
Dept: GASTROENTEROLOGY | Facility: CLINIC | Age: 58
End: 2023-10-27
Payer: COMMERCIAL

## 2023-10-27 ENCOUNTER — PRE VISIT (OUTPATIENT)
Dept: GASTROENTEROLOGY | Facility: CLINIC | Age: 58
End: 2023-10-27

## 2023-10-27 DIAGNOSIS — R14.0 BLOATING: ICD-10-CM

## 2023-10-27 DIAGNOSIS — R10.13 DYSPEPSIA: ICD-10-CM

## 2023-10-27 PROCEDURE — 99205 OFFICE O/P NEW HI 60 MIN: CPT | Mod: VID | Performed by: DIETITIAN, REGISTERED

## 2023-10-27 NOTE — PATIENT INSTRUCTIONS
It was a pleasure taking care of you today.  I've included a brief summary of our discussion and care plan from today's visit below.  Please review this information with your primary care provider.  ______________________________________________________________________    My recommendations are summarized as follows:  -- Continue fiber supplementation with 1 tablespoon of flax daily  -- Continue dietary measures including limiting alcohol intake, focusing on fruits and vegetables, moderating portion sizes  -- Let us know if your symptoms return  -- please see scheduling information provided below     Return to GI Clinic in 4 months/as needed months to review your progress.    ______________________________________________________________________    How do I schedule labs, imaging studies, or procedures that were ordered in clinic today?     Labs: To schedule lab appointment at the [facility], use my chart or call [phone]. If you have a [facility] lab closer to home where you are regularly seen you can give them a call.     Procedures: If a colonoscopy, upper endoscopy, breath test, esophageal manometry, or pH impedence was ordered today, our endoscopy team will call you to schedule this. If you have not heard from our endoscopy team within a week, please call [phone] to schedule.     Imaging Studies: If you were scheduled for a CT scan, X-ray, MRI, ultrasound, HIDA scan or other imaging study, please call [phone] to have this scheduled.     Referral: If a referral to another specialty was ordered, expect a phone call or follow instructions above. If you have not heard from anyone regarding your referral in a week, please call our clinic to check the status.     Who do I call with any questions after my visit?  Please be in touch if there are any further questions that arise following today's visit.  There are multiple ways to contact your gastroenterology care team.      During business  hours, you may reach a Gastroenterology nurse at [phone]    To schedule or reschedule an appointment, please call [phone].     You can always send a secure message through [employer].  [employer] messages are answered by your nurse or doctor typically within 24 hours.  Please allow extra time on weekends and holidays.      For urgent/emergent questions after business hours, you may reach the on-call GI Fellow by contacting the [facility] at [phone].     How will I get the results of any tests ordered?    You will receive all of your results.  If you have signed up for [employer], any tests ordered at your visit will be available to you after your provider reviews them.  Typically this takes 1-2 weeks.  If there are urgent results that require a change in your care plan, your provider or nurse will call you to discuss the next steps.      What is [employer]?  [employer] is a secure way for you to access all of your healthcare records from the [facility].  It is a web based computer program, so you can sign on to it from any location.  It also allows you to send secure messages to your care team.  I recommend signing up for [employer] access if you have not already done so and are comfortable with using a computer.      How to I schedule a follow-up visit?  If you did not schedule a follow-up visit today, please call [phone] to schedule a follow-up office visit.      Sincerely,    Zuleima Wilkins PA-C  Division of Gastroenterology, Hepatology & Nutrition  [facility]

## 2023-10-27 NOTE — NURSING NOTE
Is the patient currently in the state of [place]? YES    Visit mode:VIDEO    If the visit is dropped, the patient can be reconnected by: VIDEO VISIT: Text to cell phone:   Telephone Information:   Mobile [phone]       Will anyone else be joining the visit? NO  (If patient encounters technical issues they should call [phone]    How would you like to obtain your AVS? MyChart    Are changes needed to the allergy or medication list? No    Reason for visit: Consult    Nakul BARTLETT

## 2023-10-27 NOTE — PROGRESS NOTES
GI CLINIC VISIT    Virtual Visit Details    Type of service:  Video Visit     Originating Location (pt. Location): Home    Distant Location (provider location):  Off-site  Platform used for Video Visit: Pranay NO/REFERRING MD:  Hema Weaver  REASON FOR CONSULTATION: dyspepsia, bloating    ASSESSMENT/PLAN:  #Change in bowel pattern  #Nausea  #Upper abdominal pain  Patient with onset of thinner stools, harder stools requiring straining and occasional blood with stool.  Colonoscopy completed 7/2023 for further evaluation.  3 polyps were resected, with one larger at 20 mm, no other evidence of inflammation, source of bleeding, or other mass lesion.  It is possible that polyp was causing upper abdominal pain he was experiencing.  He has started fiber supplement and stool consistency has improved, however stools continue to be thin and sometimes feels incompletely evacuated.  Given recent colonoscopy without other findings suspect that thin stool related to pelvic floor dysfunction, and leading to increasing stool burden for patient.  Discussed that I would recommend he continue fiber supplementation to keep stools soft and regular and could refer for pelvic floor eval at the pelvic floor center.  He reports that he feels things are fairly well managed right now but will reach out if he wants a pelvic floor referral.  Suspect that nausea after procedure potentially related to initially high dose of fiber supplement given recent normal EGD and CT scan.  Other differential for nausea and upper abdominal pain include functional dyspepsia, GERD, celiac, gastroparesis, biliary, less likely something like IBD.  It is reassuring that nausea and upper abdominal pain have since resolved.  Discussed differential with patient and potential work-up.  Given symptoms are now absent he would prefer to hold off on any further testing and pursue if symptoms do return.  If symptoms return would recommend further working up with  abdominal ultrasound, celiac serologies, and H. pylori testing.  He could also try famotidine as needed to see if this is helpful, though no signs of reflux on EGD or other reflux symptoms.  If symptoms are more significant other considerations include gastric emptying study, MRE for more detailed view of bowels (no intubation into TI on colonoscopy).  -- Continue fiber supplementation with 1 tablespoon of flax daily  -- Continue dietary measures including limiting alcohol intake, focusing on fruits and vegetables, moderating portion sizes  -- Let us know if your symptoms return       Colorectal cancer screening: Last 7/2023, recommend repeat within 1 year given 20 mm tubular adenoma with displaced glands, no evidence of high-grade dysplasia, her report polyp was fully resected and removed.    RTC 4 months (if needed pending symptoms)    Thank you for this consultation.  It was a pleasure to participate in the care of this patient; please contact us with any further questions.     60Minutes was spent on the date of the encounter during chart review, history and exam, documentation, and further activities as noted       Zuleima Wilkins PA-C, RD  Division of Gastroenterology, Hepatology & Nutrition  [facility]        HPI  Emanuel Landeros is a 58 year old male with a history of TERESA, situational anxiety who presents for evaluation of dyspepsia and bloating. They are new to the [facility] and this is my first encounter with the patient.     Emanuel reports that he is here for consultation today to discuss recent GI symptoms.  He does note that all of his symptoms have now resolved over the last 3 or so weeks.    He reports that earlier this year he developed upper abdominal pain, he also had intermittent blood with his bowel movements.  He also describes change in stool pattern with thinner stools and sometimes harder stools.  Often would feel incompletely evacuated.    He underwent colonoscopy 7/27/23 which  "revealed 3 polyps, one quite large at 20 mm, and diverticulosis.  Per Emanuel's report endoscopists at pain may have been related to stool passing by the large polyp.  He was instructed to start fiber supplementation after colonoscopy.    After his colonoscopy Emanuel reports that he would often feel \" on the edge of nausea\" without severe nausea or vomiting and some ongoing upper abdominal pain.  He underwent EGD 10/16/23 which was completely normal.  No biopsies were taken.  He also had CT abdomen and pelvis 10/13/23 which was normal.    Today he reports stool consistency significantly changed with fiber supplement.  He started 2 tablespoons of flaxseed daily.  Initially had loose stools, now more formed/soft with decreasing fiber dose to 1 tablespoon.  He does report that his stools continue to be thinner in shape despite being softer, sometimes straining, sometimes feeling incompletely evacuated.  Denies any ongoing blood in stool.   He reports that he is also significantly changed his diet since his colonoscopy, cutting back on beer, cutting down on portion sizes, choosing healthier options, eating more yogurt.  Describes that after his colonoscopy he felt like he had a big change in digestion, and now things are evening out.  He reports complete resolution of abdominal pain and nausea, he did not start any acid suppression.  He has no ongoing concerns regarding his symptoms..   Weight has intentionally trended down over the last 3 months.    He does endorse history of fairly significant NSAID use for arthritis.  Has stopped related to uptick in kidney labs.  He denies any family history of colon cancer or other GI diseases.    ROS:    No fevers or chills  No weight loss  No blurry vision, double vision or change in vision  No sore throat  No lymphadenopathy  No headache, paraesthesias, or weakness in a limb  No shortness of breath or wheezing  No chest pain or pressure  No arthralgias or myalgias  No rashes or skin " changes  No odynophagia or dysphagia  No BRBPR, hematochezia, melena  No dysuria, frequency or urgency  No hot/cold intolerance or polyria  No anxiety or depression    PROBLEM LIST    Patient Active Problem List    Diagnosis Date Noted    Bilateral hand pain 05/10/2023     Priority: Medium    Situational anxiety 08/09/2022     Priority: Medium    TERESA (obstructive sleep apnea) 07/24/2012     Priority: Medium     7/24/2012: Mild TERESA (AHI ~11, eloy desat ~88%, strong positional component)      Testosterone deficiency 06/28/2010     Priority: Medium    Cervical disc disorder with radiculopathy 08/09/2009     Priority: Medium     8-09 MR    IMPRESSION:   1. Postoperative changes of an anterior cervical fusion at C6-7 with adequate spinal canal and foraminal decompression.   2. At C5-6, there is a shallow left paracentral disc protrusion which deforms the thecal sac, causes some left foraminal narrowing. Correlate clinically as to possible left C6 radiculopathy.         PERTINENT PAST MEDICAL HISTORY:  No past medical history on file.    PREVIOUS SURGERIES:    Past Surgical History:   Procedure Laterality Date    C6-C7 fusion  2006     [facility]     COLONOSCOPY N/A 7/27/2023    Procedure: Colonoscopy, with polypectomy and biopsy;  Surgeon: Dominick Walters MD;  Location: [facility]    ESOPHAGOSCOPY, GASTROSCOPY, DUODENOSCOPY (EGD), COMBINED N/A 10/16/2023    Procedure: Esophagoscopy, gastroscopy, duodenoscopy;  Surgeon: Joss Mcrae MD;  Location: [facility]    EXCISE MASS ABDOMEN Left 5/9/2018    Procedure: EXCISE MASS ABDOMEN;  Excision soft tissue mass left abdomen;  Surgeon: Froylan Ayala MD;  Location: [facility]    ORTHOPEDIC SURGERY      SOFT TISSUE SURGERY         PREVIOUS ENDOSCOPY:  Colonoscopy (no intubation into TI)  Impression:              - One 6 mm polyp in the proximal ascending colon, removed with a cold snare. Resected and retrieved. Clip was placed.    - One 5 mm polyp at 80 cm proximal to the anus,  removed with a cold snare. Resected and retrieved.    - One 20 mm polyp at 40 cm proximal to the anus, removed with a hot snare. Resected and retrieved. Tattooed. Clips were placed.   - Diverticulosis in the sigmoid colon.   - The examination was otherwise normal.     Final Diagnosis   A.  Ascending colon-proximal, polyp, polypectomy:  - Sessile serrated adenoma.  - Polyp size: 6 mm (per endoscopy report).  - Negative for cytologic dysplasia and malignancy.  - Complete resection and retrieval (per endoscopy report).     B.  Colon, polyp at 80 cm, polypectomy:  - Tubular adenoma.  - Polyp size: 5 mm (per endoscopy report).  - Negative for high-grade dysplasia and malignancy.  - Complete resection and retrieval (per endoscopy report).     C.  Colon, polyp at 40 cm, polypectomy:   - Tubular adenoma with displaced glands and evidence of remote hemorrhage (consistent with mechanical trauma).  - Polyp size: 20 mm (per endoscopy report).  - Negative for high-grade dysplasia and malignancy.  - Complete resection and retrieval (per endoscopy report).        EGD 10/16/23:  Impression:              - Z-line regular, 37 cm from the incisors.   - Gastroesophageal flap valve classified as Hill Grade  I (prominent fold, tight to endoscope).   - Normal stomach.   - Normal first portion of the duodenum and second  portion of the duodenum.   - No specimens collected.     ALLERGIES:   No Known Allergies    PERTINENT MEDICATIONS:    Current Outpatient Medications:     fluticasone (FLONASE) 50 MCG/ACT nasal spray, Use 1 spray(s) in each nostril once daily, Disp: 16 g, Rfl: 0    losartan (COZAAR) 25 MG tablet, Take 1 tablet (25 mg) by mouth daily, Disp: 90 tablet, Rfl: 3    ORDER FOR DME, CPAP: 8 cm H2O  Lifetime need and heated humidity.   , Disp: 1 each, Rfl:     ZYRTEC PO, OTC AS NEEDED, Disp: , Rfl:    No other NSAID/anticoagulation reported by patient.   No other OTC/herbal/supplements reported by patient.    SOCIAL  HISTORY:    Tobacco: no  Alcohol: beer occasionally  Drugs Use: marijuana on weekend      Social History     Socioeconomic History    Marital status:      Spouse name: Not on file    Number of children: Not on file    Years of education: Not on file    Highest education level: Not on file   Occupational History    Not on file   Tobacco Use    Smoking status: Former     Packs/day: 0     Types: Cigarettes     Quit date: 2003     Years since quittin.3     Passive exposure: Past    Smokeless tobacco: Never   Vaping Use    Vaping Use: Never used   Substance and Sexual Activity    Alcohol use: Yes     Comment: Daily - A few Beers    Drug use: Not Currently     Types: Marijuana     Comment: marajuana    Sexual activity: Yes     Partners: Female   Other Topics Concern    Parent/sibling w/ CABG, MI or angioplasty before 65F 55M? Not Asked   Social History Narrative    Not on file     Social Determinants of Health     Financial Resource Strain: Unknown (10/4/2023)    Financial Resource Strain     Within the past 12 months, have you or your family members you live with been unable to get utilities (heat, electricity) when it was really needed?: Patient refused   Food Insecurity: Unknown (10/4/2023)    Food Insecurity     Within the past 12 months, did you worry that your food would run out before you got money to buy more?: Patient refused     Within the past 12 months, did the food you bought just not last and you didn t have money to get more?: Patient refused   Transportation Needs: Unknown (10/4/2023)    Transportation Needs     Within the past 12 months, has lack of transportation kept you from medical appointments, getting your medicines, non-medical meetings or appointments, work, or from getting things that you need?: Patient refused   Physical Activity: Not on file   Stress: Not on file   Social Connections: Not on file   Interpersonal Safety: Not on file   Housing Stability: Unknown (10/4/2023)     Housing Stability     Do you have housing? : Patient refused     Are you worried about losing your housing?: Patient refused         FAMILY HISTORY:  FH of CRC: none  FH of IBD: no  FH of celiac: no  No Colon/Panc/Esophageal/other GI CA. No IBD or Celiac Disease. No other Autoimmune, Liver, or Thyroid disease.    Family History   Problem Relation Age of Onset    Prostate Cancer Father     Prostate Cancer Paternal Grandfather     Melanoma No family hx of        Past/family/social history reviewed and no changes    PHYSICAL EXAMINATION:  Constitutional: AAOx3, cooperative, pleasant, not dyspneic/diaphoretic, no acute distress  Vitals reviewed: There were no vitals taken for this visit.  Wt:   Wt Readings from Last 2 Encounters:   10/16/23 88.5 kg (195 lb)   10/04/23 88.5 kg (195 lb)      General appearance: Healthy appearing adult, in no acute distress  Eyes: Sclera anicteric, Pupils round and reactive to light  Ears, nose, mouth and throat: No obvious external lesions of ears and nose, Hearing intact  Neck: Symmetric, No obvious external lesions  Respiratory: Normal respiration, no use of accessory muscles   MSK: Gait normal  Skin: No rashes or jaundice   Psychiatric: Oriented to person, place and time, Appropriate mood and affect.         PERTINENT STUDIES:  Lab on 05/04/2023   Component Date Value Ref Range Status    Sodium 05/04/2023 141  136 - 145 mmol/L Final    Potassium 05/04/2023 4.3  3.4 - 5.3 mmol/L Final    Chloride 05/04/2023 106  98 - 107 mmol/L Final    Carbon Dioxide (CO2) 05/04/2023 29  22 - 29 mmol/L Final    Anion Gap 05/04/2023 6 (L)  7 - 15 mmol/L Final    Urea Nitrogen 05/04/2023 18.6  6.0 - 20.0 mg/dL Final    Creatinine 05/04/2023 1.21 (H)  0.67 - 1.17 mg/dL Final    Calcium 05/04/2023 9.4  8.6 - 10.0 mg/dL Final    Glucose 05/04/2023 99  70 - 99 mg/dL Final    GFR Estimate 05/04/2023 71  >60 mL/min/1.73m2 Final    PSA Tumor Marker 05/04/2023 0.40  0.00 - 3.50 ng/mL Final     CT A/P  10/13/23:  IMPRESSION:   1.  No acute intra-abdominal or intrapelvic process.  2.  Colonic diverticulosis without signs of diverticulitis.  3.  Small fat-containing noninflamed left inguinal hernia.

## 2023-10-27 NOTE — LETTER
10/27/2023         RE: Emanuel Landeros  [address]  [facility] [id number]        Dear Colleague,    Thank you for referring your patient, Emanuel Landeros, to the [facility] [place]. Please see a copy of my visit note below.    GI CLINIC VISIT    Virtual Visit Details    Type of service:  Video Visit     Originating Location (pt. Location): Home    Distant Location (provider location):  Off-site  Platform used for Video Visit: Praany NO/REFERRING MD:  Hema Weaver  REASON FOR CONSULTATION: dyspepsia, bloating    ASSESSMENT/PLAN:  #Change in bowel pattern  #Nausea  #Upper abdominal pain  Patient with onset of thinner stools, harder stools requiring straining and occasional blood with stool.  Colonoscopy completed 7/2023 for further evaluation.  3 polyps were resected, with one larger at 20 mm, no other evidence of inflammation, source of bleeding, or other mass lesion.  It is possible that polyp was causing upper abdominal pain he was experiencing.  He has started fiber supplement and stool consistency has improved, however stools continue to be thin and sometimes feels incompletely evacuated.  Given recent colonoscopy without other findings suspect that thin stool related to pelvic floor dysfunction, and leading to increasing stool burden for patient.  Discussed that I would recommend he continue fiber supplementation to keep stools soft and regular and could refer for pelvic floor eval at the pelvic floor center.  He reports that he feels things are fairly well managed right now but will reach out if he wants a pelvic floor referral.  Suspect that nausea after procedure potentially related to initially high dose of fiber supplement given recent normal EGD and CT scan.  Other differential for nausea and upper abdominal pain include functional dyspepsia, GERD, celiac, gastroparesis, biliary, less likely something like IBD.  It is reassuring that nausea and  upper abdominal pain have since resolved.  Discussed differential with patient and potential work-up.  Given symptoms are now absent he would prefer to hold off on any further testing and pursue if symptoms do return.  If symptoms return would recommend further working up with abdominal ultrasound, celiac serologies, and H. pylori testing.  He could also try famotidine as needed to see if this is helpful, though no signs of reflux on EGD or other reflux symptoms.  If symptoms are more significant other considerations include gastric emptying study, MRE for more detailed view of bowels (no intubation into TI on colonoscopy).  -- Continue fiber supplementation with 1 tablespoon of flax daily  -- Continue dietary measures including limiting alcohol intake, focusing on fruits and vegetables, moderating portion sizes  -- Let us know if your symptoms return       Colorectal cancer screening: Last 7/2023, recommend repeat within 1 year given 20 mm tubular adenoma with displaced glands, no evidence of high-grade dysplasia, her report polyp was fully resected and removed.    RTC 4 months (if needed pending symptoms)    Thank you for this consultation.  It was a pleasure to participate in the care of this patient; please contact us with any further questions.     60Minutes was spent on the date of the encounter during chart review, history and exam, documentation, and further activities as noted     HPI  Emanuel Landeros is a 58 year old male with a history of TERESA, situational anxiety who presents for evaluation of dyspepsia and bloating. They are new to the [facility] GI clinic and this is my first encounter with the patient.     Emanuel reports that he is here for consultation today to discuss recent GI symptoms.  He does note that all of his symptoms have now resolved over the last 3 or so weeks.    He reports that earlier this year he developed upper abdominal pain, he also had intermittent blood with his bowel movements.  He also  "describes change in stool pattern with thinner stools and sometimes harder stools.  Often would feel incompletely evacuated.    He underwent colonoscopy 7/27/23 which revealed 3 polyps, one quite large at 20 mm, and diverticulosis.  Per Emanuel's report endoscopists at pain may have been related to stool passing by the large polyp.  He was instructed to start fiber supplementation after colonoscopy.    After his colonoscopy Emanuel reports that he would often feel \" on the edge of nausea\" without severe nausea or vomiting and some ongoing upper abdominal pain.  He underwent EGD 10/16/23 which was completely normal.  No biopsies were taken.  He also had CT abdomen and pelvis 10/13/23 which was normal.    Today he reports stool consistency significantly changed with fiber supplement.  He started 2 tablespoons of flaxseed daily.  Initially had loose stools, now more formed/soft with decreasing fiber dose to 1 tablespoon.  He does report that his stools continue to be thinner in shape despite being softer, sometimes straining, sometimes feeling incompletely evacuated.  Denies any ongoing blood in stool.   He reports that he is also significantly changed his diet since his colonoscopy, cutting back on beer, cutting down on portion sizes, choosing healthier options, eating more yogurt.  Describes that after his colonoscopy he felt like he had a big change in digestion, and now things are evening out.  He reports complete resolution of abdominal pain and nausea, he did not start any acid suppression.  He has no ongoing concerns regarding his symptoms..   Weight has intentionally trended down over the last 3 months.    He does endorse history of fairly significant NSAID use for arthritis.  Has stopped related to uptick in kidney labs.  He denies any family history of colon cancer or other GI diseases.    ROS:    No fevers or chills  No weight loss  No blurry vision, double vision or change in vision  No sore throat  No " lymphadenopathy  No headache, paraesthesias, or weakness in a limb  No shortness of breath or wheezing  No chest pain or pressure  No arthralgias or myalgias  No rashes or skin changes  No odynophagia or dysphagia  No BRBPR, hematochezia, melena  No dysuria, frequency or urgency  No hot/cold intolerance or polyria  No anxiety or depression    PROBLEM LIST    Patient Active Problem List    Diagnosis Date Noted    Bilateral hand pain 05/10/2023     Priority: Medium    Situational anxiety 08/09/2022     Priority: Medium    TERESA (obstructive sleep apnea) 07/24/2012     Priority: Medium     7/24/2012: Mild TERESA (AHI ~11, eloy desat ~88%, strong positional component)      Testosterone deficiency 06/28/2010     Priority: Medium    Cervical disc disorder with radiculopathy 08/09/2009     Priority: Medium     8-09 MR    IMPRESSION:   1. Postoperative changes of an anterior cervical fusion at C6-7 with adequate spinal canal and foraminal decompression.   2. At C5-6, there is a shallow left paracentral disc protrusion which deforms the thecal sac, causes some left foraminal narrowing. Correlate clinically as to possible left C6 radiculopathy.         PERTINENT PAST MEDICAL HISTORY:  No past medical history on file.    PREVIOUS SURGERIES:    Past Surgical History:   Procedure Laterality Date    C6-C7 fusion  2006     [facility]     COLONOSCOPY N/A 7/27/2023    Procedure: Colonoscopy, with polypectomy and biopsy;  Surgeon: Dominick Walters MD;  Location: [facility]    ESOPHAGOSCOPY, GASTROSCOPY, DUODENOSCOPY (EGD), COMBINED N/A 10/16/2023    Procedure: Esophagoscopy, gastroscopy, duodenoscopy;  Surgeon: Joss Mcrae MD;  Location: [facility]    EXCISE MASS ABDOMEN Left 5/9/2018    Procedure: EXCISE MASS ABDOMEN;  Excision soft tissue mass left abdomen;  Surgeon: Froylan Ayala MD;  Location: [facility]    ORTHOPEDIC SURGERY      SOFT TISSUE SURGERY         PREVIOUS ENDOSCOPY:  Colonoscopy (no intubation into TI)  Impression:               - One 6 mm polyp in the proximal ascending colon, removed with a cold snare. Resected and retrieved. Clip was placed.    - One 5 mm polyp at 80 cm proximal to the anus, removed with a cold snare. Resected and retrieved.    - One 20 mm polyp at 40 cm proximal to the anus, removed with a hot snare. Resected and retrieved. Tattooed. Clips were placed.   - Diverticulosis in the sigmoid colon.   - The examination was otherwise normal.     Final Diagnosis   A.  Ascending colon-proximal, polyp, polypectomy:  - Sessile serrated adenoma.  - Polyp size: 6 mm (per endoscopy report).  - Negative for cytologic dysplasia and malignancy.  - Complete resection and retrieval (per endoscopy report).     B.  Colon, polyp at 80 cm, polypectomy:  - Tubular adenoma.  - Polyp size: 5 mm (per endoscopy report).  - Negative for high-grade dysplasia and malignancy.  - Complete resection and retrieval (per endoscopy report).     C.  Colon, polyp at 40 cm, polypectomy:   - Tubular adenoma with displaced glands and evidence of remote hemorrhage (consistent with mechanical trauma).  - Polyp size: 20 mm (per endoscopy report).  - Negative for high-grade dysplasia and malignancy.  - Complete resection and retrieval (per endoscopy report).        EGD 10/16/23:  Impression:              - Z-line regular, 37 cm from the incisors.   - Gastroesophageal flap valve classified as Hill Grade  I (prominent fold, tight to endoscope).   - Normal stomach.   - Normal first portion of the duodenum and second  portion of the duodenum.   - No specimens collected.     ALLERGIES:   No Known Allergies    PERTINENT MEDICATIONS:    Current Outpatient Medications:     fluticasone (FLONASE) 50 MCG/ACT nasal spray, Use 1 spray(s) in each nostril once daily, Disp: 16 g, Rfl: 0    losartan (COZAAR) 25 MG tablet, Take 1 tablet (25 mg) by mouth daily, Disp: 90 tablet, Rfl: 3    ORDER FOR DME, CPAP: 8 cm H2O  Lifetime need and heated humidity.   , Disp: 1 each, Rfl:      ZYRTEC PO, OTC AS NEEDED, Disp: , Rfl:    No other NSAID/anticoagulation reported by patient.   No other OTC/herbal/supplements reported by patient.    SOCIAL HISTORY:    Tobacco: no  Alcohol: beer occasionally  Drugs Use: marijuana on weekend      Social History     Socioeconomic History    Marital status:      Spouse name: Not on file    Number of children: Not on file    Years of education: Not on file    Highest education level: Not on file   Occupational History    Not on file   Tobacco Use    Smoking status: Former     Packs/day: 0     Types: Cigarettes     Quit date: 2003     Years since quittin.3     Passive exposure: Past    Smokeless tobacco: Never   Vaping Use    Vaping Use: Never used   Substance and Sexual Activity    Alcohol use: Yes     Comment: Daily - A few Beers    Drug use: Not Currently     Types: Marijuana     Comment: marajuana    Sexual activity: Yes     Partners: Female   Other Topics Concern    Parent/sibling w/ CABG, MI or angioplasty before 65F 55M? Not Asked   Social History Narrative    Not on file     Social Determinants of Health     Financial Resource Strain: Unknown (10/4/2023)    Financial Resource Strain     Within the past 12 months, have you or your family members you live with been unable to get utilities (heat, electricity) when it was really needed?: Patient refused   Food Insecurity: Unknown (10/4/2023)    Food Insecurity     Within the past 12 months, did you worry that your food would run out before you got money to buy more?: Patient refused     Within the past 12 months, did the food you bought just not last and you didn t have money to get more?: Patient refused   Transportation Needs: Unknown (10/4/2023)    Transportation Needs     Within the past 12 months, has lack of transportation kept you from medical appointments, getting your medicines, non-medical meetings or appointments, work, or from getting things that you need?: Patient refused   Physical  Activity: Not on file   Stress: Not on file   Social Connections: Not on file   Interpersonal Safety: Not on file   Housing Stability: Unknown (10/4/2023)    Housing Stability     Do you have housing? : Patient refused     Are you worried about losing your housing?: Patient refused         FAMILY HISTORY:  FH of CRC: none  FH of IBD: no  FH of celiac: no  No Colon/Panc/Esophageal/other GI CA. No IBD or Celiac Disease. No other Autoimmune, Liver, or Thyroid disease.    Family History   Problem Relation Age of Onset    Prostate Cancer Father     Prostate Cancer Paternal Grandfather     Melanoma No family hx of        Past/family/social history reviewed and no changes    PHYSICAL EXAMINATION:  Constitutional: AAOx3, cooperative, pleasant, not dyspneic/diaphoretic, no acute distress  Vitals reviewed: There were no vitals taken for this visit.  Wt:   Wt Readings from Last 2 Encounters:   10/16/23 88.5 kg (195 lb)   10/04/23 88.5 kg (195 lb)      General appearance: Healthy appearing adult, in no acute distress  Eyes: Sclera anicteric, Pupils round and reactive to light  Ears, nose, mouth and throat: No obvious external lesions of ears and nose, Hearing intact  Neck: Symmetric, No obvious external lesions  Respiratory: Normal respiration, no use of accessory muscles   MSK: Gait normal  Skin: No rashes or jaundice   Psychiatric: Oriented to person, place and time, Appropriate mood and affect.         PERTINENT STUDIES:  Lab on 05/04/2023   Component Date Value Ref Range Status    Sodium 05/04/2023 141  136 - 145 mmol/L Final    Potassium 05/04/2023 4.3  3.4 - 5.3 mmol/L Final    Chloride 05/04/2023 106  98 - 107 mmol/L Final    Carbon Dioxide (CO2) 05/04/2023 29  22 - 29 mmol/L Final    Anion Gap 05/04/2023 6 (L)  7 - 15 mmol/L Final    Urea Nitrogen 05/04/2023 18.6  6.0 - 20.0 mg/dL Final    Creatinine 05/04/2023 1.21 (H)  0.67 - 1.17 mg/dL Final    Calcium 05/04/2023 9.4  8.6 - 10.0 mg/dL Final    Glucose 05/04/2023 99   70 - 99 mg/dL Final    GFR Estimate 05/04/2023 71  >60 mL/min/1.73m2 Final    PSA Tumor Marker 05/04/2023 0.40  0.00 - 3.50 ng/mL Final     CT A/P 10/13/23:  IMPRESSION:   1.  No acute intra-abdominal or intrapelvic process.  2.  Colonic diverticulosis without signs of diverticulitis.  3.  Small fat-containing noninflamed left inguinal hernia.         Again, thank you for allowing me to participate in the care of your patient.      Sincerely,    Zuleima Wilkins PA-C

## 2023-10-31 ENCOUNTER — TELEPHONE (OUTPATIENT)
Dept: GASTROENTEROLOGY | Facility: CLINIC | Age: 58
End: 2023-10-31
Payer: COMMERCIAL

## 2023-10-31 NOTE — TELEPHONE ENCOUNTER
LVM and sent MyC regarding the following;    Gi recheck, 4 mo follow-up ( around 02/27/2024) with Zuleima Wilkins PA-C.  Left CC number

## 2023-11-03 NOTE — TELEPHONE ENCOUNTER
2nd attempt- LVM and sent mychart    Schedule:  4 mo follow-up appointment with Zuleima Wilkins (around Feb 2024). RTN GI, in person or virtual.

## 2024-06-02 ENCOUNTER — HEALTH MAINTENANCE LETTER (OUTPATIENT)
Age: 59
End: 2024-06-02

## 2024-06-12 ENCOUNTER — MYC MEDICAL ADVICE (OUTPATIENT)
Dept: FAMILY MEDICINE | Facility: CLINIC | Age: 59
End: 2024-06-12
Payer: COMMERCIAL

## 2024-06-12 DIAGNOSIS — Z12.11 COLON CANCER SCREENING: Primary | ICD-10-CM

## 2024-06-12 NOTE — TELEPHONE ENCOUNTER
Pls see [employer] message below.     Order pended, message routed to provider for consideration.     Julie Behrendt RN

## 2024-08-20 DIAGNOSIS — I10 BENIGN ESSENTIAL HYPERTENSION: ICD-10-CM

## 2024-08-22 RX ORDER — LOSARTAN POTASSIUM 25 MG/1
25 TABLET ORAL DAILY
Qty: 90 TABLET | Refills: 0 | Status: SHIPPED | OUTPATIENT
Start: 2024-08-22

## 2024-08-22 NOTE — TELEPHONE ENCOUNTER
Refilled 1 time only, please call patient to schedule for Office visit per Dr Goss.  Mecca Mitchell MSN, RN

## 2024-09-19 ENCOUNTER — ANESTHESIA EVENT (OUTPATIENT)
Dept: GASTROENTEROLOGY | Facility: CLINIC | Age: 59
End: 2024-09-19
Payer: COMMERCIAL

## 2024-09-19 ASSESSMENT — LIFESTYLE VARIABLES: TOBACCO_USE: 1

## 2024-09-19 NOTE — ANESTHESIA PREPROCEDURE EVALUATION
Anesthesia Pre-Procedure Evaluation    Patient: Emanuel Landeros   MRN: [id number] : 1965        Procedure : Procedure(s):  Colonoscopy          No past medical history on file.   Past Surgical History:   Procedure Laterality Date    C6-C7 fusion       [facility]     COLONOSCOPY N/A 2023    Procedure: Colonoscopy, with polypectomy and biopsy;  Surgeon: Dominick Walters MD;  Location: [facility]    ESOPHAGOSCOPY, GASTROSCOPY, DUODENOSCOPY (EGD), COMBINED N/A 10/16/2023    Procedure: Esophagoscopy, gastroscopy, duodenoscopy;  Surgeon: Joss Mcrae MD;  Location: [facility]    EXCISE MASS ABDOMEN Left 2018    Procedure: EXCISE MASS ABDOMEN;  Excision soft tissue mass left abdomen;  Surgeon: Froylan Ayala MD;  Location: [facility]    ORTHOPEDIC SURGERY      SOFT TISSUE SURGERY        No Known Allergies   Social History     Tobacco Use    Smoking status: Former     Current packs/day: 0.00     Types: Cigarettes     Quit date: 2003     Years since quittin.2     Passive exposure: Past    Smokeless tobacco: Never   Substance Use Topics    Alcohol use: Yes     Comment: Daily - A few Beers      Wt Readings from Last 1 Encounters:   10/16/23 88.5 kg (195 lb)        Anesthesia Evaluation   Pt has had prior anesthetic. Type: General, Regional and MAC.        ROS/MED HX  ENT/Pulmonary:     (+) sleep apnea,               tobacco use, Past use,                       Neurologic:       Cardiovascular:       METS/Exercise Tolerance:     Hematologic:       Musculoskeletal: Comment: S/P C6-7 fusion    (+)  arthritis,             GI/Hepatic:       Renal/Genitourinary:       Endo:       Psychiatric/Substance Use:     (+) psychiatric history anxiety       Infectious Disease:       Malignancy:       Other:          Physical Exam    Airway  airway exam normal           Respiratory Devices and Support         Dental       (+) Modest Abnormalities - crowns, retainers, 1 or 2 missing teeth      Cardiovascular    "cardiovascular exam normal          Pulmonary   pulmonary exam normal                OUTSIDE LABS:  CBC:   Lab Results   Component Value Date    WBC 8.2 04/13/2023    WBC 7.2 05/30/2019    HGB 13.6 04/13/2023    HGB 13.1 (L) 05/30/2019    HCT 40.5 04/13/2023    HCT 39.4 (L) 05/30/2019     04/13/2023     05/30/2019     BMP:   Lab Results   Component Value Date     05/04/2023     04/13/2023    POTASSIUM 4.3 05/04/2023    POTASSIUM 4.3 04/13/2023    CHLORIDE 106 05/04/2023    CHLORIDE 107 04/13/2023    CO2 29 05/04/2023    CO2 22 04/13/2023    BUN 18.6 05/04/2023    BUN 21.6 (H) 04/13/2023    CR 1.21 (H) 05/04/2023    CR 1.47 (H) 04/13/2023    GLC 99 05/04/2023     (H) 04/13/2023     COAGS: No results found for: \"PTT\", \"INR\", \"FIBR\"  POC: No results found for: \"BGM\", \"HCG\", \"HCGS\"  HEPATIC:   Lab Results   Component Value Date    ALT 30 05/30/2019     OTHER:   Lab Results   Component Value Date    ALINE 9.4 05/04/2023    TSH 3.97 11/20/2018    T4 1.01 06/28/2010    CRP 4.6 12/06/2018    SED 8 04/13/2023       Anesthesia Plan    ASA Status:  3    NPO Status:  NPO Appropriate    Anesthesia Type: General.      Maintenance: Balanced.        Consents    Anesthesia Plan(s) and associated risks, benefits, and realistic alternatives discussed. Questions answered and patient/representative(s) expressed understanding.     - Discussed:     - Discussed with:  Patient            Postoperative Care            Comments:               MARY Gordon CRNA    I have reviewed the pertinent notes and labs in the chart from the past 30 days and (re)examined the patient.  Any updates or changes from those notes are reflected in this note.                  "

## 2024-09-20 ENCOUNTER — HOSPITAL ENCOUNTER (OUTPATIENT)
Facility: CLINIC | Age: 59
Discharge: HOME | End: 2024-09-20
Attending: SURGERY | Admitting: SURGERY
Payer: COMMERCIAL

## 2024-09-20 ENCOUNTER — PATIENT OUTREACH (OUTPATIENT)
Dept: GASTROENTEROLOGY | Facility: CLINIC | Age: 59
End: 2024-09-20
Payer: COMMERCIAL

## 2024-09-20 ENCOUNTER — ANESTHESIA (OUTPATIENT)
Dept: GASTROENTEROLOGY | Facility: CLINIC | Age: 59
End: 2024-09-20
Payer: COMMERCIAL

## 2024-09-20 VITALS
HEIGHT: 68 IN | HEART RATE: 49 BPM | BODY MASS INDEX: 29.55 KG/M2 | RESPIRATION RATE: 16 BRPM | WEIGHT: 195 LBS | DIASTOLIC BLOOD PRESSURE: 81 MMHG | SYSTOLIC BLOOD PRESSURE: 144 MMHG | TEMPERATURE: 98.7 F | OXYGEN SATURATION: 97 %

## 2024-09-20 LAB — COLONOSCOPY: NORMAL

## 2024-09-20 PROCEDURE — 370N000017 HC ANESTHESIA TECHNICAL FEE, PER MIN: Performed by: SURGERY

## 2024-09-20 PROCEDURE — 258N000003 HC RX IP 258 OP 636: Performed by: SURGERY

## 2024-09-20 PROCEDURE — 45378 DIAGNOSTIC COLONOSCOPY: CPT | Performed by: SURGERY

## 2024-09-20 PROCEDURE — 250N000009 HC RX 250: Performed by: NURSE ANESTHETIST, CERTIFIED REGISTERED

## 2024-09-20 PROCEDURE — 250N000011 HC RX IP 250 OP 636: Performed by: NURSE ANESTHETIST, CERTIFIED REGISTERED

## 2024-09-20 PROCEDURE — G0121 COLON CA SCRN NOT HI RSK IND: HCPCS | Performed by: SURGERY

## 2024-09-20 PROCEDURE — 250N000009 HC RX 250: Performed by: SURGERY

## 2024-09-20 RX ORDER — NALOXONE HYDROCHLORIDE 0.4 MG/ML
0.4 INJECTION, SOLUTION INTRAMUSCULAR; INTRAVENOUS; SUBCUTANEOUS
Status: CANCELLED | OUTPATIENT
Start: 2024-09-20

## 2024-09-20 RX ORDER — LIDOCAINE HYDROCHLORIDE 20 MG/ML
INJECTION, SOLUTION INFILTRATION; PERINEURAL PRN
Status: DISCONTINUED | OUTPATIENT
Start: 2024-09-20 | End: 2024-09-20

## 2024-09-20 RX ORDER — PROPOFOL 10 MG/ML
INJECTION, EMULSION INTRAVENOUS CONTINUOUS PRN
Status: DISCONTINUED | OUTPATIENT
Start: 2024-09-20 | End: 2024-09-20

## 2024-09-20 RX ORDER — LIDOCAINE 40 MG/G
CREAM TOPICAL
Status: DISCONTINUED | OUTPATIENT
Start: 2024-09-20 | End: 2024-09-20 | Stop reason: HOSPADM

## 2024-09-20 RX ORDER — SODIUM CHLORIDE, SODIUM LACTATE, POTASSIUM CHLORIDE, CALCIUM CHLORIDE 600; 310; 30; 20 MG/100ML; MG/100ML; MG/100ML; MG/100ML
INJECTION, SOLUTION INTRAVENOUS CONTINUOUS
Status: DISCONTINUED | OUTPATIENT
Start: 2024-09-20 | End: 2024-09-20 | Stop reason: HOSPADM

## 2024-09-20 RX ORDER — NALOXONE HYDROCHLORIDE 0.4 MG/ML
0.2 INJECTION, SOLUTION INTRAMUSCULAR; INTRAVENOUS; SUBCUTANEOUS
Status: CANCELLED | OUTPATIENT
Start: 2024-09-20

## 2024-09-20 RX ORDER — FLUMAZENIL 0.1 MG/ML
0.2 INJECTION, SOLUTION INTRAVENOUS
Status: CANCELLED | OUTPATIENT
Start: 2024-09-20 | End: 2024-09-20

## 2024-09-20 RX ORDER — GLYCOPYRROLATE 0.2 MG/ML
INJECTION, SOLUTION INTRAMUSCULAR; INTRAVENOUS PRN
Status: DISCONTINUED | OUTPATIENT
Start: 2024-09-20 | End: 2024-09-20

## 2024-09-20 RX ORDER — ONDANSETRON 2 MG/ML
4 INJECTION INTRAMUSCULAR; INTRAVENOUS
Status: DISCONTINUED | OUTPATIENT
Start: 2024-09-20 | End: 2024-09-20 | Stop reason: HOSPADM

## 2024-09-20 RX ADMIN — GLYCOPYRROLATE 0.2 MG: 0.2 INJECTION, SOLUTION INTRAMUSCULAR; INTRAVENOUS at 08:58

## 2024-09-20 RX ADMIN — LIDOCAINE HYDROCHLORIDE 100 MG: 20 INJECTION, SOLUTION INFILTRATION; PERINEURAL at 08:58

## 2024-09-20 RX ADMIN — LIDOCAINE HYDROCHLORIDE 0.2 ML: 10 INJECTION, SOLUTION EPIDURAL; INFILTRATION; INTRACAUDAL; PERINEURAL at 08:31

## 2024-09-20 RX ADMIN — SODIUM CHLORIDE, POTASSIUM CHLORIDE, SODIUM LACTATE AND CALCIUM CHLORIDE 30 ML: 600; 310; 30; 20 INJECTION, SOLUTION INTRAVENOUS at 08:30

## 2024-09-20 RX ADMIN — PROPOFOL 200 MCG/KG/MIN: 10 INJECTION, EMULSION INTRAVENOUS at 08:58

## 2024-09-20 ASSESSMENT — ACTIVITIES OF DAILY LIVING (ADL): ADLS_ACUITY_SCORE: 35

## 2024-09-20 NOTE — ANESTHESIA CARE TRANSFER NOTE
Patient: Emanuel Landeros    Procedure: Procedure(s):  Colonoscopy       Diagnosis: Colon cancer screening [Z12.11]  Diagnosis Additional Information: No value filed.    Anesthesia Type:   General     Note:    Oropharynx: oropharynx clear of all foreign objects and spontaneously breathing  Level of Consciousness: awake and drowsy  Oxygen Supplementation: room air    Independent Airway: airway patency satisfactory and stable  Dentition: dentition unchanged  Vital Signs Stable: post-procedure vital signs reviewed and stable  Report to RN Given: handoff report given  Patient transferred to: Phase II    Handoff Report: Identifed the Patient, Identified the Reponsible Provider, Reviewed the pertinent medical history, Discussed the surgical course, Reviewed Intra-OP anesthesia mangement and issues during anesthesia, Set expectations for post-procedure period and Allowed opportunity for questions and acknowledgement of understanding      Vitals:  Vitals Value Taken Time   BP     Temp     Pulse     Resp     SpO2         Electronically Signed By: MARY Rodriges CRNA  September 20, 2024  9:14 AM

## 2024-09-20 NOTE — ANESTHESIA POSTPROCEDURE EVALUATION
Patient: Emanuel Landeros    Procedure: Procedure(s):  Colonoscopy       Anesthesia Type:  General    Note:  Disposition: Outpatient   Postop Pain Control: Uneventful            Sign Out: Well controlled pain   PONV: No   Neuro/Psych: Uneventful            Sign Out: Acceptable/Baseline neuro status   Airway/Respiratory: Uneventful            Sign Out: Acceptable/Baseline resp. status   CV/Hemodynamics: Uneventful            Sign Out: Acceptable CV status; No obvious hypovolemia; No obvious fluid overload   Other NRE: NONE   DID A NON-ROUTINE EVENT OCCUR? No           Last vitals:  Vitals:    09/20/24 0812   BP: (!) 144/92   Pulse: (!) 44   Resp: 20   Temp: 37.1  C (98.7  F)   SpO2: 99%       Electronically Signed By: MARY Rodriges CRNA  September 20, 2024  9:14 AM

## 2024-09-20 NOTE — H&P
[facility]    Pre-Endoscopy History and Physical     Emanuel Landeros MRN# [id number]   YOB: 1965 Age: 59 year old     Date of Procedure: 9/20/2024  Primary care provider: Hema Weaver  Type of Endoscopy: Colonoscopy with possible biopsy, possible polypectomy  Reason for Procedure: Polyp  Type of Anesthesia Anticipated: Conscious Sedation    HPI:    Emanuel is a 59 year old male who will be undergoing the above procedure.      Patient had piecemeal polypectomy in 2023.  Denies blood in stool or change in stool size.      A history and physical has been performed. The patient's medications and allergies have been reviewed. The risks and benefits of the procedure and the sedation options and risks were discussed with the patient.  All questions were answered and informed consent was obtained.      He denies a personal or family history of anesthesia complications or bleeding disorders.     Patient Active Problem List   Diagnosis    Testosterone deficiency    TERESA (obstructive sleep apnea)    Cervical disc disorder with radiculopathy    Situational anxiety    Bilateral hand pain        History reviewed. No pertinent past medical history.     Past Surgical History:   Procedure Laterality Date    C6-C7 fusion  2006     [facility]     COLONOSCOPY N/A 7/27/2023    Procedure: Colonoscopy, with polypectomy and biopsy;  Surgeon: Dominick Walters MD;  Location: [facility]    ESOPHAGOSCOPY, GASTROSCOPY, DUODENOSCOPY (EGD), COMBINED N/A 10/16/2023    Procedure: Esophagoscopy, gastroscopy, duodenoscopy;  Surgeon: Joss Mcrae MD;  Location: [facility]    EXCISE MASS ABDOMEN Left 5/9/2018    Procedure: EXCISE MASS ABDOMEN;  Excision soft tissue mass left abdomen;  Surgeon: Froylan Ayala MD;  Location: [facility]    ORTHOPEDIC SURGERY      SOFT TISSUE SURGERY         Social History     Tobacco Use    Smoking status: Former     Current packs/day: 0.00     Types: Cigarettes     Quit date: 6/17/2003  "    Years since quittin.2     Passive exposure: Past    Smokeless tobacco: Never   Substance Use Topics    Alcohol use: Yes     Comment: Daily - A few Beers       Family History   Problem Relation Age of Onset    Prostate Cancer Father     Prostate Cancer Paternal Grandfather     Melanoma No family hx of        Prior to Admission medications    Medication Sig Start Date End Date Taking? Authorizing Provider   fluticasone (FLONASE) 50 MCG/ACT nasal spray Use 1 spray(s) in each nostril once daily 21  Yes Hema Weaver MD   losartan (COZAAR) 25 MG tablet Take 1 tablet by mouth once daily 24  Yes Hema Weaver MD   ORDER FOR DME CPAP:  8 cm H2O    Lifetime need and heated humidity.      10/5/12   Bernardo Williamson MD   ZYRTE PO OTC AS NEEDED    Reported, Patient       No Known Allergies     REVIEW OF SYSTEMS:   5 point ROS negative except as noted above in HPI, including Gen., Resp., CV, GI &  system review.    PHYSICAL EXAM:   BP (!) 144/92 (BP Location: Left arm)   Pulse (!) 44   Temp 98.7  F (37.1  C) (Oral)   Resp 20   Ht 1.727 m (5' 8\")   Wt 88.5 kg (195 lb)   SpO2 99%   BMI 29.65 kg/m   Estimated body mass index is 29.65 kg/m  as calculated from the following:    Height as of this encounter: 1.727 m (5' 8\").    Weight as of this encounter: 88.5 kg (195 lb).   Constitutional: Awake, alert, no acute distress.  Eyes: No scleral icterus.  Conjunctiva are without injection.  ENMT: Mucous membranes moist, dentition and gums are intact.   Neck: Soft, supple, trachea midline.    Endocrine: n/a   Lymphatic: There is no cervical, submandibularadenopathy.  Respiratory: normal effortgs   Cardiovascular: S1, S2  Abdomen: Non-distended, non-tender,  No masses,  Musculoskeletal: No spinal or CVA tenderness. Full range of motion in the upper and lower extremities.    Skin: No skin rashes or lesions to inspection.  No petechia.    Neurologic: alerted and oriented 3x  Psychiatric: The " patient's affect is not blunted and mood is appropriate.  DIAGNOSTICS:    Not indicated    IMPRESSION   ASA Class 2 - Mild systemic disease    PLAN:   Plan for Colonoscopy with possible biopsy, possible polypectomy. We discussed the risks, benefits and alternatives and the patient wished to proceed.  Patient is cleared for the above procedure.    The above has been forwarded to the consulting provider.    Garfield Cisneros DO  [facility]

## 2025-06-14 ENCOUNTER — HEALTH MAINTENANCE LETTER (OUTPATIENT)
Age: 60
End: 2025-06-14

## 2025-07-21 ENCOUNTER — OFFICE VISIT (OUTPATIENT)
Dept: FAMILY MEDICINE | Facility: CLINIC | Age: 60
End: 2025-07-21
Payer: COMMERCIAL

## 2025-07-21 VITALS
WEIGHT: 200 LBS | HEART RATE: 43 BPM | BODY MASS INDEX: 30.31 KG/M2 | HEIGHT: 68 IN | SYSTOLIC BLOOD PRESSURE: 153 MMHG | RESPIRATION RATE: 20 BRPM | DIASTOLIC BLOOD PRESSURE: 88 MMHG | OXYGEN SATURATION: 98 % | TEMPERATURE: 97.5 F

## 2025-07-21 DIAGNOSIS — Z13.6 SCREENING FOR CARDIOVASCULAR CONDITION: ICD-10-CM

## 2025-07-21 DIAGNOSIS — Z11.4 SCREENING FOR HIV (HUMAN IMMUNODEFICIENCY VIRUS): ICD-10-CM

## 2025-07-21 DIAGNOSIS — Z00.00 ROUTINE HISTORY AND PHYSICAL EXAMINATION OF ADULT: Primary | ICD-10-CM

## 2025-07-21 DIAGNOSIS — Z11.59 NEED FOR HEPATITIS C SCREENING TEST: ICD-10-CM

## 2025-07-21 DIAGNOSIS — Z12.5 SCREENING FOR PROSTATE CANCER: ICD-10-CM

## 2025-07-21 DIAGNOSIS — I10 BENIGN ESSENTIAL HYPERTENSION: ICD-10-CM

## 2025-07-21 LAB
ANION GAP SERPL CALCULATED.3IONS-SCNC: 14 MMOL/L (ref 7–15)
BUN SERPL-MCNC: 16.9 MG/DL (ref 6–20)
CALCIUM SERPL-MCNC: 9.2 MG/DL (ref 8.8–10.4)
CHLORIDE SERPL-SCNC: 105 MMOL/L (ref 98–107)
CHOLEST SERPL-MCNC: 239 MG/DL
CREAT SERPL-MCNC: 1.13 MG/DL (ref 0.67–1.17)
EGFRCR SERPLBLD CKD-EPI 2021: 76 ML/MIN/1.73M2
FASTING STATUS PATIENT QL REPORTED: NO
FASTING STATUS PATIENT QL REPORTED: NO
GLUCOSE SERPL-MCNC: 95 MG/DL (ref 70–99)
HCO3 SERPL-SCNC: 21 MMOL/L (ref 22–29)
HDLC SERPL-MCNC: 48 MG/DL
LDLC SERPL CALC-MCNC: 165 MG/DL
NONHDLC SERPL-MCNC: 191 MG/DL
POTASSIUM SERPL-SCNC: 4.2 MMOL/L (ref 3.4–5.3)
PSA SERPL DL<=0.01 NG/ML-MCNC: 0.38 NG/ML (ref 0–3.5)
SODIUM SERPL-SCNC: 140 MMOL/L (ref 135–145)
TRIGL SERPL-MCNC: 130 MG/DL

## 2025-07-21 PROCEDURE — G0103 PSA SCREENING: HCPCS | Performed by: FAMILY MEDICINE

## 2025-07-21 PROCEDURE — 90715 TDAP VACCINE 7 YRS/> IM: CPT | Performed by: FAMILY MEDICINE

## 2025-07-21 PROCEDURE — 90471 IMMUNIZATION ADMIN: CPT | Performed by: FAMILY MEDICINE

## 2025-07-21 PROCEDURE — 99214 OFFICE O/P EST MOD 30 MIN: CPT | Mod: 25 | Performed by: FAMILY MEDICINE

## 2025-07-21 PROCEDURE — 36415 COLL VENOUS BLD VENIPUNCTURE: CPT | Performed by: FAMILY MEDICINE

## 2025-07-21 PROCEDURE — 80048 BASIC METABOLIC PNL TOTAL CA: CPT | Performed by: FAMILY MEDICINE

## 2025-07-21 PROCEDURE — 99396 PREV VISIT EST AGE 40-64: CPT | Mod: 25 | Performed by: FAMILY MEDICINE

## 2025-07-21 PROCEDURE — 80061 LIPID PANEL: CPT | Performed by: FAMILY MEDICINE

## 2025-07-21 RX ORDER — LOSARTAN POTASSIUM 25 MG/1
25 TABLET ORAL DAILY
Qty: 90 TABLET | Refills: 3 | Status: SHIPPED | OUTPATIENT
Start: 2025-07-21

## 2025-07-21 SDOH — HEALTH STABILITY: PHYSICAL HEALTH: ON AVERAGE, HOW MANY DAYS PER WEEK DO YOU ENGAGE IN MODERATE TO STRENUOUS EXERCISE (LIKE A BRISK WALK)?: 0 DAYS

## 2025-07-21 SDOH — HEALTH STABILITY: PHYSICAL HEALTH: ON AVERAGE, HOW MANY MINUTES DO YOU ENGAGE IN EXERCISE AT THIS LEVEL?: 0 MIN

## 2025-07-21 ASSESSMENT — SOCIAL DETERMINANTS OF HEALTH (SDOH): HOW OFTEN DO YOU GET TOGETHER WITH FRIENDS OR RELATIVES?: NEVER

## 2025-07-21 ASSESSMENT — PAIN SCALES - GENERAL: PAINLEVEL_OUTOF10: NO PAIN (0)

## 2025-07-21 NOTE — PROGRESS NOTES
"Preventive Care Visit  [facility]  Hema Weaver MD, Family Medicine  Jul 21, 2025      Assessment & Plan     Routine history and physical examination of adult  Blood pressure above target goal of less than 140/90.  Patient stopped taking losartan about 1 year ago.  Suggested regular exercise, healthy diet and weight loss.  Recommended to restart losartan and follow-up with RN in 2 weeks for repeat blood pressure check    Benign essential hypertension  As above    Screening for HIV (human immunodeficiency virus)  Need for hepatitis C screening test  Patient declined hepatitis C and HIV screening      BMI  Estimated body mass index is 30.41 kg/m  as calculated from the following:    Height as of this encounter: 1.727 m (5' 8\").    Weight as of this encounter: 90.7 kg (200 lb).   Weight management plan: Discussed healthy diet and exercise guidelines    Counseling  Appropriate preventive services were addressed with this patient via screening, questionnaire, or discussion as appropriate for fall prevention, nutrition, physical activity, Tobacco-use cessation, social engagement, weight loss and cognition.  Checklist reviewing preventive services available has been given to the patient.  Reviewed patient's diet, addressing concerns and/or questions.   Patient is at risk for social isolation and has been provided with information about the benefit of social connection.   The patient reports drinking more than 3 alcoholic drinks per day and/or more than 7 drhnks per week. The patient was counseled and given information about possible harmful effects of excessive alcohol intake.Reviewed preventive health counseling, as reflected in patient instructions        Matias Castellanos is a 59 year old, presenting for the following:  Physical        7/21/2025     2:34 PM   Additional Questions   Roomed by Digna ARMSTRONG LPN   Accompanied by Self          HPI    Advance Care Planning    Discussed advance " care planning with patient; however, patient declined at this time.        7/21/2025   General Health   How would you rate your overall physical health? Good   Feel stress (tense, anxious, or unable to sleep) Only a little   (!) STRESS CONCERN      7/21/2025   Nutrition   Three or more servings of calcium each day? (!) I DON'T KNOW   Diet: I don't know   How many servings of fruit and vegetables per day? (!) I DON'T KNOW   How many sweetened beverages each day? (!) I DON'T KNOW         7/21/2025   Exercise   Days per week of moderate/strenous exercise 0 days   Average minutes spent exercising at this level 0 min   (!) EXERCISE CONCERN      7/21/2025   Social Factors   Frequency of gathering with friends or relatives Never   Worry food won't last until get money to buy more Patient declined   Food not last or not have enough money for food? Patient declined   Do you have housing? (Housing is defined as stable permanent housing and does not include staying outside in a car, in a tent, in an abandoned building, in an overnight shelter, or couch-surfing.) Patient declined   Are you worried about losing your housing? Patient declined   Lack of transportation? Patient declined   Unable to get utilities (heat,electricity)? Patient declined   (!) SOCIAL CONNECTIONS CONCERN      7/21/2025   Fall Risk   Fallen 2 or more times in the past year? No   Trouble with walking or balance? No          7/21/2025   Dental   Dentist two times every year? Yes         Today's PHQ-2 Score:       7/21/2025     2:33 PM   PHQ-2 ( 1999 [employer])   Q1: Little interest or pleasure in doing things 0    Q2: Feeling down, depressed or hopeless 0    PHQ-2 Score 0    Q1: Little interest or pleasure in doing things Not at all   Q2: Feeling down, depressed or hopeless Not at all   PHQ-2 Score 0       Proxy-reported           7/21/2025   Substance Use   Alcohol more than 3/day or more than 7/wk Yes   How often do you have a drink containing alcohol 4 or  more times a week   How many alcohol drinks on typical day 10 or more   How often do you have 5+ drinks at one occasion Weekly   Audit 2/3 Score 7   How often not able to stop drinking once started Never   How often failed to do what normally expected Never   How often needed first drink in am after a heavy drinking session Never   How often feeling of guilt or remorse after drinking Never   How often unable to remember what happened the night before Never   Have you or someone else been injured because of your drinking No   Has anyone been concerned or suggested you cut down on drinking No   TOTAL SCORE - AUDIT 11   Do you use any other substances recreationally? (!) CANNABIS PRODUCTS     Social History     Tobacco Use    Smoking status: Former     Current packs/day: 0.00     Types: Cigarettes     Quit date: 2003     Years since quittin.1     Passive exposure: Past    Smokeless tobacco: Never   Vaping Use    Vaping status: Never Used   Substance Use Topics    Alcohol use: Yes     Comment: Daily - A few Beers    Drug use: Not Currently     Types: Marijuana     Comment: daniella             2025   One time HIV Screening   Previous HIV test? No         2025   STI Screening   New sexual partner(s) since last STI/HIV test? No   Last PSA:   PSA   Date Value Ref Range Status   10/26/2020 0.44 0 - 4 ug/L Final     Comment:     Assay Method:  Chemiluminescence using Siemens Vista analyzer     PSA Tumor Marker   Date Value Ref Range Status   2023 0.40 0.00 - 3.50 ng/mL Final     ASCVD Risk   The ASCVD Risk score (Mahesh HERNÁNDEZ, et al., 2019) failed to calculate for the following reasons:    Cannot find a previous HDL lab    Cannot find a previous total cholesterol lab           Reviewed and updated as needed this visit by Provider                    No past medical history on file.  Past Surgical History:   Procedure Laterality Date    C6-C7 fusion       [facility]     COLONOSCOPY N/A  7/27/2023    Procedure: Colonoscopy, with polypectomy and biopsy;  Surgeon: Dominick Walters MD;  Location: [facility]    COLONOSCOPY N/A 9/20/2024    Procedure: Colonoscopy;  Surgeon: Garfield Cisneros DO;  Location: [facility]    ESOPHAGOSCOPY, GASTROSCOPY, DUODENOSCOPY (EGD), COMBINED N/A 10/16/2023    Procedure: Esophagoscopy, gastroscopy, duodenoscopy;  Surgeon: Joss Mcrae MD;  Location: [facility]    EXCISE MASS ABDOMEN Left 5/9/2018    Procedure: EXCISE MASS ABDOMEN;  Excision soft tissue mass left abdomen;  Surgeon: Froylan Ayala MD;  Location: [facility]    ORTHOPEDIC SURGERY      SOFT TISSUE SURGERY       OB History   No obstetric history on file.     Lab work is in process  Labs reviewed in EPIC  BP Readings from Last 3 Encounters:   07/21/25 (!) 153/88   09/20/24 (!) 144/81   10/16/23 132/87    Wt Readings from Last 3 Encounters:   07/21/25 90.7 kg (200 lb)   09/20/24 88.5 kg (195 lb)   10/16/23 88.5 kg (195 lb)                  Patient Active Problem List   Diagnosis    Testosterone deficiency    TERESA (obstructive sleep apnea)    Cervical disc disorder with radiculopathy    Situational anxiety    Bilateral hand pain     Past Surgical History:   Procedure Laterality Date    C6-C7 fusion  2006     [facility]     COLONOSCOPY N/A 7/27/2023    Procedure: Colonoscopy, with polypectomy and biopsy;  Surgeon: Dominick Walters MD;  Location: [facility]    COLONOSCOPY N/A 9/20/2024    Procedure: Colonoscopy;  Surgeon: Garfield Cisneros DO;  Location: [facility]    ESOPHAGOSCOPY, GASTROSCOPY, DUODENOSCOPY (EGD), COMBINED N/A 10/16/2023    Procedure: Esophagoscopy, gastroscopy, duodenoscopy;  Surgeon: Joss Mcrae MD;  Location: [facility]    EXCISE MASS ABDOMEN Left 5/9/2018    Procedure: EXCISE MASS ABDOMEN;  Excision soft tissue mass left abdomen;  Surgeon: Froylan Ayala MD;  Location: [facility]    ORTHOPEDIC SURGERY      SOFT TISSUE SURGERY         Social History     Tobacco Use    Smoking status: Former  "    Current packs/day: 0.00     Types: Cigarettes     Quit date: 2003     Years since quittin.1     Passive exposure: Past    Smokeless tobacco: Never   Substance Use Topics    Alcohol use: Yes     Comment: Daily - A few Beers     Family History   Problem Relation Age of Onset    Prostate Cancer Father     Prostate Cancer Paternal Grandfather     Melanoma No family hx of          Current Outpatient Medications   Medication Sig Dispense Refill    ORDER FOR DME CPAP:  8 cm H2O    Lifetime need and heated humidity.      1 each     fluticasone (FLONASE) 50 MCG/ACT nasal spray Use 1 spray(s) in each nostril once daily (Patient not taking: Reported on 2025) 16 g 0    losartan (COZAAR) 25 MG tablet Take 1 tablet by mouth once daily (Patient not taking: Reported on 2025) 90 tablet 0    ZYRTEC PO OTC AS NEEDED (Patient not taking: Reported on 2025)       No Known Allergies  Recent Labs   Lab Test 23  1542 23  1602 19  1550 19  1610 19  1610 18  1620   ALT  --   --  30  --  27  --    CR 1.21* 1.47* 1.05   < > 0.98  --    GFRESTIMATED 71 56* 81   < > 89  --    GFRESTBLACK  --   --  >90  --  >90  --    POTASSIUM 4.3 4.3  --   --   --   --    TSH  --   --   --   --   --  3.97    < > = values in this interval not displayed.           Review of Systems  Constitutional, neuro, ENT, endocrine, pulmonary, cardiac, gastrointestinal, genitourinary, musculoskeletal, integument and psychiatric systems are negative, except as otherwise noted.     Objective    Exam  BP (!) 153/88   Pulse (!) 43   Temp 97.5  F (36.4  C) (Tympanic)   Resp 20   Ht 1.727 m (5' 8\")   Wt 90.7 kg (200 lb)   SpO2 98%   BMI 30.41 kg/m     Estimated body mass index is 30.41 kg/m  as calculated from the following:    Height as of this encounter: 1.727 m (5' 8\").    Weight as of this encounter: 90.7 kg (200 lb).    Physical Exam  GENERAL: alert and no distress  EYES: Eyes grossly normal to inspection, " PERRL and conjunctivae and sclerae normal  HENT: normal cephalic/atraumatic, nose and mouth without ulcers or lesions, oropharynx clear, and oral mucous membranes moist  NECK: no adenopathy, no asymmetry, masses, or scars  RESP: lungs clear to auscultation - no rales, rhonchi or wheezes  CV: regular rate and rhythm, normal S1 S2, no S3 or S4, no murmur, click or rub, no peripheral edema  ABDOMEN: soft, nontender, no hepatosplenomegaly, no masses   MS: no gross musculoskeletal defects noted, no edema  SKIN: no suspicious lesions or rashes  NEURO: Normal strength and tone, mentation intact and speech normal  PSYCH: mentation appears normal, affect normal/bright      Signed Electronically by: Hema Weaver MD

## 2025-07-22 ENCOUNTER — RESULTS FOLLOW-UP (OUTPATIENT)
Dept: FAMILY MEDICINE | Facility: CLINIC | Age: 60
End: 2025-07-22
Payer: COMMERCIAL

## (undated) DEVICE — GOWN XLG DISP 9545

## (undated) DEVICE — SNARE LARIAT MULTI OVAL- 55X30MM HEX- 28X10MM DMD- 15X6MM

## (undated) DEVICE — PREP CHLORAPREP 26ML TINTED ORANGE  260815

## (undated) DEVICE — SYR 10ML FINGER CONTROL W/O NDL 309695

## (undated) DEVICE — SU MONOCRYL 4-0 PS-2 18" UND Y496G

## (undated) DEVICE — SU VICRYL 3-0 SH 27" UND J416H

## (undated) DEVICE — CLIP HEMOSTASIS ASSURANCE W16 MM BX00711884

## (undated) DEVICE — GLOVE PROTEXIS BLUE W/NEU-THERA 7.5  2D73EB75

## (undated) DEVICE — PACK LAPAROTOMY CUSTOM LAKES

## (undated) DEVICE — GLOVE PROTEXIS W/NEU-THERA 7.5  2D73TE75

## (undated) DEVICE — SYR ENDO MARKER SPOT GI 5ML GIS-45

## (undated) DEVICE — NDL SCLEROTHERAPY 25GA CARR-LOCK  00711811

## (undated) DEVICE — ADHESIVE SWIFTSET 0.8ML OCTYL SS6

## (undated) RX ORDER — DEXAMETHASONE SODIUM PHOSPHATE 4 MG/ML
INJECTION, SOLUTION INTRA-ARTICULAR; INTRALESIONAL; INTRAMUSCULAR; INTRAVENOUS; SOFT TISSUE
Status: DISPENSED
Start: 2018-05-09

## (undated) RX ORDER — BUPIVACAINE HYDROCHLORIDE AND EPINEPHRINE 5; 5 MG/ML; UG/ML
INJECTION, SOLUTION EPIDURAL; INTRACAUDAL; PERINEURAL
Status: DISPENSED
Start: 2018-05-09

## (undated) RX ORDER — ONDANSETRON 2 MG/ML
INJECTION INTRAMUSCULAR; INTRAVENOUS
Status: DISPENSED
Start: 2023-07-27

## (undated) RX ORDER — FENTANYL CITRATE 50 UG/ML
INJECTION, SOLUTION INTRAMUSCULAR; INTRAVENOUS
Status: DISPENSED
Start: 2018-05-09

## (undated) RX ORDER — LIDOCAINE HYDROCHLORIDE 10 MG/ML
INJECTION, SOLUTION EPIDURAL; INFILTRATION; INTRACAUDAL; PERINEURAL
Status: DISPENSED
Start: 2024-09-20

## (undated) RX ORDER — ONDANSETRON 2 MG/ML
INJECTION INTRAMUSCULAR; INTRAVENOUS
Status: DISPENSED
Start: 2018-05-09